# Patient Record
Sex: MALE | Race: WHITE | Employment: PART TIME | URBAN - METROPOLITAN AREA
[De-identification: names, ages, dates, MRNs, and addresses within clinical notes are randomized per-mention and may not be internally consistent; named-entity substitution may affect disease eponyms.]

---

## 2017-03-03 DIAGNOSIS — F41.9 ANXIETY: ICD-10-CM

## 2017-03-03 RX ORDER — ESCITALOPRAM OXALATE 20 MG/1
TABLET ORAL
Qty: 30 TABLET | Refills: 0 | Status: SHIPPED | OUTPATIENT
Start: 2017-03-03 | End: 2017-03-08 | Stop reason: SDUPTHER

## 2017-03-08 ENCOUNTER — OFFICE VISIT (OUTPATIENT)
Dept: FAMILY MEDICINE CLINIC | Age: 47
End: 2017-03-08

## 2017-03-08 VITALS
BODY MASS INDEX: 38.33 KG/M2 | WEIGHT: 283 LBS | HEART RATE: 62 BPM | SYSTOLIC BLOOD PRESSURE: 130 MMHG | DIASTOLIC BLOOD PRESSURE: 58 MMHG | HEIGHT: 72 IN | RESPIRATION RATE: 16 BRPM

## 2017-03-08 DIAGNOSIS — F41.9 ANXIETY: ICD-10-CM

## 2017-03-08 PROCEDURE — 99213 OFFICE O/P EST LOW 20 MIN: CPT | Performed by: FAMILY MEDICINE

## 2017-03-08 RX ORDER — ESCITALOPRAM OXALATE 20 MG/1
TABLET ORAL
Qty: 90 TABLET | Refills: 1 | Status: SHIPPED | OUTPATIENT
Start: 2017-03-08 | End: 2017-07-10 | Stop reason: SDUPTHER

## 2017-03-08 ASSESSMENT — PATIENT HEALTH QUESTIONNAIRE - PHQ9
1. LITTLE INTEREST OR PLEASURE IN DOING THINGS: 0
SUM OF ALL RESPONSES TO PHQ QUESTIONS 1-9: 0
2. FEELING DOWN, DEPRESSED OR HOPELESS: 0
SUM OF ALL RESPONSES TO PHQ9 QUESTIONS 1 & 2: 0

## 2017-07-10 DIAGNOSIS — F41.9 ANXIETY: ICD-10-CM

## 2017-07-10 RX ORDER — ESCITALOPRAM OXALATE 20 MG/1
TABLET ORAL
Qty: 90 TABLET | Refills: 0 | Status: SHIPPED | OUTPATIENT
Start: 2017-07-10 | End: 2018-01-08 | Stop reason: SDUPTHER

## 2017-08-10 DIAGNOSIS — F41.9 ANXIETY: ICD-10-CM

## 2017-11-03 ENCOUNTER — OFFICE VISIT (OUTPATIENT)
Dept: FAMILY MEDICINE CLINIC | Age: 47
End: 2017-11-03

## 2017-11-03 ENCOUNTER — HOSPITAL ENCOUNTER (OUTPATIENT)
Dept: INFUSION THERAPY | Age: 47
Discharge: HOME | End: 2017-11-30
Attending: FAMILY MEDICINE | Admitting: FAMILY MEDICINE

## 2017-11-03 ENCOUNTER — HOSPITAL ENCOUNTER (OUTPATIENT)
Dept: OTHER | Age: 47
Discharge: HOME | End: 2017-11-03
Attending: FAMILY MEDICINE | Admitting: FAMILY MEDICINE

## 2017-11-03 VITALS
HEIGHT: 72 IN | BODY MASS INDEX: 33.86 KG/M2 | HEART RATE: 86 BPM | WEIGHT: 250 LBS | DIASTOLIC BLOOD PRESSURE: 61 MMHG | SYSTOLIC BLOOD PRESSURE: 89 MMHG

## 2017-11-03 VITALS
RESPIRATION RATE: 17 BRPM | TEMPERATURE: 98.2 F | OXYGEN SATURATION: 97 % | SYSTOLIC BLOOD PRESSURE: 112 MMHG | DIASTOLIC BLOOD PRESSURE: 70 MMHG | HEART RATE: 68 BPM

## 2017-11-03 DIAGNOSIS — R63.4 UNINTENTIONAL WEIGHT LOSS: Primary | ICD-10-CM

## 2017-11-03 DIAGNOSIS — E86.0 DEHYDRATION: ICD-10-CM

## 2017-11-03 DIAGNOSIS — M54.50 ACUTE BILATERAL LOW BACK PAIN WITHOUT SCIATICA: ICD-10-CM

## 2017-11-03 LAB
ALBUMIN SERPL-MCNC: 4.1 G/DL (ref 3.4–5)
ALP BLD-CCNC: 94 U/L (ref 40–129)
ALT SERPL-CCNC: 22 U/L (ref 10–40)
ANION GAP SERPL CALCULATED.3IONS-SCNC: 16 MMOL/L (ref 3–16)
AST SERPL-CCNC: 18 U/L (ref 15–37)
BASOPHILS ABSOLUTE: 0.1 K/UL (ref 0–0.2)
BASOPHILS RELATIVE PERCENT: 0.5 %
BILIRUB SERPL-MCNC: 0.7 MG/DL (ref 0–1)
BILIRUBIN DIRECT: <0.2 MG/DL (ref 0–0.3)
BILIRUBIN, INDIRECT: NORMAL MG/DL (ref 0–1)
BUN BLDV-MCNC: 15 MG/DL (ref 7–20)
C-REACTIVE PROTEIN: 34.8 MG/L (ref 0–5.1)
CALCIUM SERPL-MCNC: 10.1 MG/DL (ref 8.3–10.6)
CHLORIDE BLD-SCNC: 98 MMOL/L (ref 99–110)
CO2: 26 MMOL/L (ref 21–32)
CREAT SERPL-MCNC: 0.9 MG/DL (ref 0.9–1.3)
CRYSTALS, UA: ABNORMAL /HPF
EOSINOPHILS ABSOLUTE: 0.3 K/UL (ref 0–0.6)
EOSINOPHILS RELATIVE PERCENT: 2.5 %
EPITHELIAL CELLS, UA: 0 /HPF (ref 0–5)
GFR AFRICAN AMERICAN: >60
GFR NON-AFRICAN AMERICAN: >60
GLUCOSE BLD-MCNC: 108 MG/DL (ref 70–99)
HCT VFR BLD CALC: 38.4 % (ref 40.5–52.5)
HEMOGLOBIN: 12.8 G/DL (ref 13.5–17.5)
HYALINE CASTS: 9 /LPF (ref 0–8)
LYMPHOCYTES ABSOLUTE: 1.8 K/UL (ref 1–5.1)
LYMPHOCYTES RELATIVE PERCENT: 18.1 %
MCH RBC QN AUTO: 28 PG (ref 26–34)
MCHC RBC AUTO-ENTMCNC: 33.4 G/DL (ref 31–36)
MCV RBC AUTO: 83.9 FL (ref 80–100)
MONOCYTES ABSOLUTE: 0.8 K/UL (ref 0–1.3)
MONOCYTES RELATIVE PERCENT: 7.8 %
NEUTROPHILS ABSOLUTE: 7.2 K/UL (ref 1.7–7.7)
NEUTROPHILS RELATIVE PERCENT: 71.1 %
PDW BLD-RTO: 13.6 % (ref 12.4–15.4)
PLATELET # BLD: 309 K/UL (ref 135–450)
PMV BLD AUTO: 10.6 FL (ref 5–10.5)
POTASSIUM SERPL-SCNC: 4.3 MMOL/L (ref 3.5–5.1)
RBC # BLD: 4.57 M/UL (ref 4.2–5.9)
RBC UA: 4 /HPF (ref 0–4)
SEDIMENTATION RATE, ERYTHROCYTE: 98 MM/HR (ref 0–15)
SODIUM BLD-SCNC: 140 MMOL/L (ref 136–145)
TOTAL PROTEIN: 8.2 G/DL (ref 6.4–8.2)
WBC # BLD: 10.2 K/UL (ref 4–11)
WBC UA: 35 /HPF (ref 0–5)

## 2017-11-03 PROCEDURE — 99214 OFFICE O/P EST MOD 30 MIN: CPT | Performed by: FAMILY MEDICINE

## 2017-11-03 PROCEDURE — 36415 COLL VENOUS BLD VENIPUNCTURE: CPT | Performed by: FAMILY MEDICINE

## 2017-11-03 RX ORDER — METHYLPREDNISOLONE 4 MG/1
TABLET ORAL
Qty: 1 KIT | Refills: 0 | Status: ON HOLD | OUTPATIENT
Start: 2017-11-03 | End: 2017-11-10 | Stop reason: HOSPADM

## 2017-11-03 RX ORDER — SODIUM CHLORIDE 9 MG/ML
INJECTION, SOLUTION INTRAVENOUS ONCE
Status: COMPLETED | OUTPATIENT
Start: 2017-11-03 | End: 2017-11-03

## 2017-11-03 RX ORDER — CYCLOBENZAPRINE HCL 5 MG
5 TABLET ORAL 3 TIMES DAILY PRN
Qty: 30 TABLET | Refills: 0 | Status: SHIPPED | OUTPATIENT
Start: 2017-11-03 | End: 2017-11-13

## 2017-11-03 RX ORDER — NAPROXEN 500 MG/1
500 TABLET ORAL 2 TIMES DAILY WITH MEALS
Qty: 60 TABLET | Refills: 0 | Status: SHIPPED | OUTPATIENT
Start: 2017-11-03 | End: 2018-05-01 | Stop reason: ALTCHOICE

## 2017-11-03 RX ADMIN — SODIUM CHLORIDE: 9 INJECTION, SOLUTION INTRAVENOUS at 14:23

## 2017-11-05 ENCOUNTER — TELEPHONE (OUTPATIENT)
Dept: FAMILY MEDICINE CLINIC | Age: 47
End: 2017-11-05

## 2017-11-05 DIAGNOSIS — R63.4 ABNORMAL WEIGHT LOSS: Primary | ICD-10-CM

## 2017-11-05 DIAGNOSIS — R70.0 ESR RAISED: ICD-10-CM

## 2017-11-05 DIAGNOSIS — M54.50 LOW BACK PAIN WITHOUT SCIATICA, UNSPECIFIED BACK PAIN LATERALITY, UNSPECIFIED CHRONICITY: ICD-10-CM

## 2017-11-05 DIAGNOSIS — R79.82 CRP ELEVATED: ICD-10-CM

## 2017-11-06 ENCOUNTER — HOSPITAL ENCOUNTER (OUTPATIENT)
Dept: CT IMAGING | Age: 47
Discharge: HOME | End: 2017-11-06
Attending: FAMILY MEDICINE | Admitting: FAMILY MEDICINE

## 2017-11-06 ENCOUNTER — HOSPITAL ENCOUNTER (OUTPATIENT)
Dept: OTHER | Age: 47
Discharge: HOME | End: 2017-11-06
Attending: FAMILY MEDICINE | Admitting: FAMILY MEDICINE

## 2017-11-06 DIAGNOSIS — R79.82 CRP ELEVATED: ICD-10-CM

## 2017-11-06 DIAGNOSIS — R63.4 ABNORMAL WEIGHT LOSS: ICD-10-CM

## 2017-11-06 DIAGNOSIS — R70.0 ESR RAISED: ICD-10-CM

## 2017-11-06 DIAGNOSIS — M54.50 LOW BACK PAIN WITHOUT SCIATICA, UNSPECIFIED BACK PAIN LATERALITY, UNSPECIFIED CHRONICITY: ICD-10-CM

## 2017-11-06 NOTE — TELEPHONE ENCOUNTER
Called central scheduling and they will call me back after they get the approval stat time. Left message for patient to call back. MRI is on pending. Appointment 11:30 and be there 10:50. LM for Herlinda Medrano, [facility], to call us back. Unable to LM for Ken Meyer, v-mailbox has not been set up.

## 2017-11-06 NOTE — TELEPHONE ENCOUNTER
Yaritza,    I ordered CT scan of chest/abd/pelvis as well. Please schedule. Also, call lab back and make sure they received the add on orders for SPEP and path review of blood smear.       Keep trying to reach patient about imaging

## 2017-11-06 NOTE — TELEPHONE ENCOUNTER
Yaritza,    Call radiology and find out if he showed up for MRI.     If not, let them know we are still going to try to get hold of him for the CT scan this afternoon

## 2017-11-07 PROBLEM — R63.4 WEIGHT LOSS, NON-INTENTIONAL: Status: ACTIVE | Noted: 2017-11-07

## 2017-11-07 PROBLEM — M46.26 OSTEOMYELITIS OF LUMBAR SPINE (HCC): Status: ACTIVE | Noted: 2017-11-07

## 2017-11-07 PROBLEM — M46.46 DISCITIS OF LUMBAR REGION: Status: ACTIVE | Noted: 2017-11-07

## 2017-11-08 LAB
ALBUMIN SERPL-MCNC: 3.5 G/DL (ref 3.1–4.9)
ALPHA-1-GLOBULIN: 0.4 G/DL (ref 0.2–0.4)
ALPHA-2-GLOBULIN: 0.9 G/DL (ref 0.4–1.1)
BETA GLOBULIN: 1.3 G/DL (ref 0.9–1.6)
GAMMA GLOBULIN: 2.1 G/DL (ref 0.6–1.8)
SPE/IFE INTERPRETATION: NORMAL

## 2017-11-10 ENCOUNTER — TELEPHONE (OUTPATIENT)
Dept: FAMILY MEDICINE CLINIC | Age: 47
End: 2017-11-10

## 2017-11-13 ENCOUNTER — OFFICE VISIT (OUTPATIENT)
Dept: FAMILY MEDICINE CLINIC | Age: 47
End: 2017-11-13

## 2017-11-13 VITALS
TEMPERATURE: 98.4 F | OXYGEN SATURATION: 97 % | RESPIRATION RATE: 16 BRPM | WEIGHT: 253 LBS | HEART RATE: 99 BPM | SYSTOLIC BLOOD PRESSURE: 108 MMHG | HEIGHT: 72 IN | DIASTOLIC BLOOD PRESSURE: 60 MMHG | BODY MASS INDEX: 34.27 KG/M2

## 2017-11-13 DIAGNOSIS — L02.91 PHLEGMON: ICD-10-CM

## 2017-11-13 DIAGNOSIS — M46.46 DISCITIS OF LUMBAR REGION: Primary | ICD-10-CM

## 2017-11-13 DIAGNOSIS — M46.26 OSTEOMYELITIS OF LUMBAR SPINE (HCC): ICD-10-CM

## 2017-11-13 LAB
BASOPHILS ABSOLUTE: 0.05 /ΜL
BASOPHILS RELATIVE PERCENT: 1 %
BUN BLDV-MCNC: 12 MG/DL
C-REACTIVE PROTEIN: 32.7
CALCIUM SERPL-MCNC: 9.2 MG/DL
CHLORIDE BLD-SCNC: 106 MMOL/L
CO2: 28 MMOL/L
CREAT SERPL-MCNC: 0.94 MG/DL
EOSINOPHILS ABSOLUTE: 0.29 /ΜL
EOSINOPHILS RELATIVE PERCENT: 4 %
GFR CALCULATED: >60
GLUCOSE BLD-MCNC: 93 MG/DL
HCT VFR BLD CALC: 36 % (ref 41–53)
HEMOGLOBIN: 11.8 G/DL (ref 13.5–17.5)
LYMPHOCYTES ABSOLUTE: 1.66 /ΜL
LYMPHOCYTES RELATIVE PERCENT: 21 %
MCH RBC QN AUTO: 27 PG
MCHC RBC AUTO-ENTMCNC: 33 G/DL
MCV RBC AUTO: 82 FL
MONOCYTES ABSOLUTE: 0.55 /ΜL
MONOCYTES RELATIVE PERCENT: 7 %
NEUTROPHILS ABSOLUTE: 5.43 /ΜL
NEUTROPHILS RELATIVE PERCENT: 67 %
PDW BLD-RTO: 13.8 %
PLATELET # BLD: 292 K/ΜL
PMV BLD AUTO: ABNORMAL FL
POTASSIUM SERPL-SCNC: 4.4 MMOL/L
RBC # BLD: 4.39 10^6/ΜL
SEDIMENTATION RATE, ERYTHROCYTE: 44
SODIUM BLD-SCNC: 141 MMOL/L
TOTAL CK: 35 U/L
WBC # BLD: 8 10^3/ML

## 2017-11-13 PROCEDURE — 99214 OFFICE O/P EST MOD 30 MIN: CPT | Performed by: FAMILY MEDICINE

## 2017-11-13 PROCEDURE — 86580 TB INTRADERMAL TEST: CPT | Performed by: FAMILY MEDICINE

## 2017-11-13 NOTE — PROGRESS NOTES
weeks. Patient states he is now eating and drinking normally. He still is in a lot of pain. He does feel that the pain has mildly improved, not by much. He has a home health nurse that checks on him once a week. His wife helps give him his IV treatments daily. Vitals:    11/13/17 1430   BP: 108/60   Pulse: 99   Resp: 16   Temp: 98.4 °F (36.9 °C)   TempSrc: Oral   SpO2: 97%   Weight: 253 lb (114.8 kg)   Height: 6' (1.829 m)       Outpatient Prescriptions Marked as Taking for the 11/13/17 encounter (Office Visit) with Vaishnavi Delaney MD   Medication Sig Dispense Refill    cyclobenzaprine (FLEXERIL) 5 MG tablet Take 1 tablet by mouth 3 times daily as needed for Muscle spasms 30 tablet 0    naproxen (NAPROSYN) 500 MG tablet Take 1 tablet by mouth 2 times daily (with meals) 60 tablet 0    escitalopram (LEXAPRO) 20 MG tablet TAKE 1 TABLET DAILY 90 tablet 0       Past Medical History:   Diagnosis Date    Anxiety     was on paxil in the past    History of tobacco abuse        History reviewed. No pertinent surgical history.     Social History   Substance Use Topics    Smoking status: Former Smoker     Packs/day: 2.00     Years: 10.00     Quit date: 5/13/2007    Smokeless tobacco: Former User    Alcohol use No       Family History   Problem Relation Age of Onset    Stroke Father     Asthma Sister     High Blood Pressure Brother            ROS:    Constitutional:  Negative for activity or appetite change, fever or fatigue  HENT:  Negative for congestion, sinus pressure, or rhinorrhea  Eyes:  Negative for eye pain or visual changes  Resp:  Negative for SOB, chest tightness, cough  Cardiovascular: Negative for CP, palpitations, SANTOS, orthopnea, PND, LE edema  Gastrointestinal: Negative for abd pain, melena, BRBPR, N/V/D  Endocrine:  Negative for polydipsia and polyuria  :  Negative for dysuria, flank pain or urinary frequency  Musculoskeletal:  Negative for myalgias, +moderate back pain  Neuro: Negative for dizziness or lightheadedness  Psych: negative for depression or anxiety        PHYSICAL EXAM:  Constitutional:   · Reviewed vitals above  · Well Nourished, well developed, no distress       Neck:  · Symmetric and without masses  · No thyromegaly  Resp:  · Normal effort  · Clear to auscultation bilaterally without rhonchi, wheezing or crackles  Cardiovascular:  · On auscultation, normal S1 and S2 without murmurs, rubs or gallops  · No bruits of bilateral carotids and no JVD  Gastrointestinal:  · Nontender, nondistended, and no masses  · No hepatosplenomegaly        Musculoskeletal:  - Tenderness to Palpation:  upper lumbar region  - Range of Motion:     flexion- patient can only flex forward a couple inches before severe pain starts  extension- diminished range with pain    rotation- diminished range with pain bilaterally    lateral bending-diminished range with pain bilaterally  - Scoliosis is absent. - Hyperkyphosis is absent. Neurological:   - Straight leg raise is negative bilaterally. - Gait is abnormal - antalgic:  But he has an easier time raising from seated position than last time I saw him  - Heel walk is normal.  - Toe walk is normal.  - Motor function is normal.  - Sensory function is normal.  - Reflexes are intact and symmetrical bilaterally. Skin:  · No rashes on inspection  Psych:  · Normal mood and affect  · Normal insight and judgement    ASSESSMENT/PLAN:    Osteomyelitis of lumbar spine (HCC)/ Discitis of lumbar region/Phlegmon  Patient clinically showing some mild improvement. He is able to stand up more easily from a seated position than last time. He still does have significant pain, especially with minimal flexion of the spine. Patient told to call Dr. Rikki Arreaga ASAP to schedule his follow-up visit, as he is supposed to be seen by him in 2 weeks    Patient to continue to receive IV daptomycin and IV ceftriaxone daily via his PICC line until 1/3/18.     I fill out short-term disability paperwork, and recommend that patient remain off work until 1/3/18.      - Mantoux testing to ensure this is negative. Amadou Mcdonald

## 2017-11-15 ENCOUNTER — NURSE ONLY (OUTPATIENT)
Dept: FAMILY MEDICINE CLINIC | Age: 47
End: 2017-11-15

## 2017-11-15 LAB
INDURATION: 0
TB SKIN TEST: NEGATIVE

## 2017-11-16 ENCOUNTER — TELEPHONE (OUTPATIENT)
Dept: INFECTIOUS DISEASES | Age: 47
End: 2017-11-16

## 2017-11-16 NOTE — TELEPHONE ENCOUNTER
Call to morning to schedule follow up with pt and pt is insisting that you told him to come in 2 weeks after d/c from hospital.   He would not let me hang up to ensured him that you know I was not scheduling him for 2 weeks  He's scheduled for 1/3/18 and on wait list  Thank you

## 2017-11-20 LAB
BASOPHILS ABSOLUTE: 0.3 /ΜL
BASOPHILS RELATIVE PERCENT: 0 %
BUN BLDV-MCNC: 14 MG/DL
C-REACTIVE PROTEIN: 27.7
CALCIUM SERPL-MCNC: 9.1 MG/DL
CHLORIDE BLD-SCNC: 102 MMOL/L
CO2: 27 MMOL/L
CREAT SERPL-MCNC: 0.88 MG/DL
EOSINOPHILS ABSOLUTE: 0.35 /ΜL
EOSINOPHILS RELATIVE PERCENT: 5 %
GFR CALCULATED: 60
GLUCOSE BLD-MCNC: 128 MG/DL
HCT VFR BLD CALC: 36 % (ref 41–53)
HEMOGLOBIN: 11.8 G/DL (ref 13.5–17.5)
LYMPHOCYTES ABSOLUTE: 1.51 /ΜL
LYMPHOCYTES RELATIVE PERCENT: 21 %
MCH RBC QN AUTO: 27 PG
MCHC RBC AUTO-ENTMCNC: 33 G/DL
MCV RBC AUTO: 83 FL
MONOCYTES ABSOLUTE: 0.25 /ΜL
MONOCYTES RELATIVE PERCENT: 4 %
NEUTROPHILS ABSOLUTE: 4.99 /ΜL
NEUTROPHILS RELATIVE PERCENT: ABNORMAL %
PDW BLD-RTO: 13.8 %
PLATELET # BLD: 283 K/ΜL
PMV BLD AUTO: ABNORMAL FL
POTASSIUM SERPL-SCNC: 4 MMOL/L
RBC # BLD: 4.32 10^6/ΜL
SEDIMENTATION RATE, ERYTHROCYTE: 25
SODIUM BLD-SCNC: 138 MMOL/L
TOTAL CK: 29 U/L
WBC # BLD: 7.1 10^3/ML

## 2017-11-27 LAB
BASOPHILS ABSOLUTE: 1 /ΜL
BASOPHILS RELATIVE PERCENT: 0.04 %
BUN BLDV-MCNC: 17 MG/DL
C-REACTIVE PROTEIN: 13.2
CALCIUM SERPL-MCNC: 9.4 MG/DL
CHLORIDE BLD-SCNC: 100 MMOL/L
CO2: 28 MMOL/L
CREAT SERPL-MCNC: 0.9 MG/DL
EOSINOPHILS ABSOLUTE: 9 /ΜL
EOSINOPHILS RELATIVE PERCENT: 0.66 %
GFR CALCULATED: 60
GLUCOSE BLD-MCNC: 83 MG/DL
HCT VFR BLD CALC: 36 % (ref 41–53)
HEMOGLOBIN: 11.9 G/DL (ref 13.5–17.5)
LYMPHOCYTES ABSOLUTE: 30 /ΜL
LYMPHOCYTES RELATIVE PERCENT: 2.26 %
MCH RBC QN AUTO: 27 PG
MCHC RBC AUTO-ENTMCNC: 33 G/DL
MCV RBC AUTO: 81 FL
MONOCYTES ABSOLUTE: 8 /ΜL
MONOCYTES RELATIVE PERCENT: 0.58 %
NEUTROPHILS ABSOLUTE: 52 /ΜL
NEUTROPHILS RELATIVE PERCENT: 3.99 %
PDW BLD-RTO: 14.4 %
PLATELET # BLD: 255 K/ΜL
PMV BLD AUTO: ABNORMAL FL
POTASSIUM SERPL-SCNC: 4.4 MMOL/L
RBC # BLD: 4.41 10^6/ΜL
SEDIMENTATION RATE, ERYTHROCYTE: 32
SODIUM BLD-SCNC: 137 MMOL/L
TOTAL CK: 70 U/L
WBC # BLD: 7.6 10^3/ML

## 2017-12-01 ENCOUNTER — TELEPHONE (OUTPATIENT)
Dept: FAMILY MEDICINE CLINIC | Age: 47
End: 2017-12-01

## 2017-12-01 ENCOUNTER — HOSPITAL ENCOUNTER (OUTPATIENT)
Dept: INFUSION THERAPY | Age: 47
Discharge: HOME | End: 2017-12-31
Attending: FAMILY MEDICINE | Admitting: FAMILY MEDICINE

## 2017-12-01 NOTE — TELEPHONE ENCOUNTER
Patient calling because he states he is feeling a little better and is wondering if he can return to work on light duty. Please call him back at [phone].

## 2017-12-01 NOTE — TELEPHONE ENCOUNTER
He will need to schedule appointment for evaluation to determine this    Please document call and then close encounter.   thanks

## 2017-12-04 ENCOUNTER — OFFICE VISIT (OUTPATIENT)
Dept: FAMILY MEDICINE CLINIC | Age: 47
End: 2017-12-04

## 2017-12-04 VITALS
DIASTOLIC BLOOD PRESSURE: 70 MMHG | BODY MASS INDEX: 35.49 KG/M2 | HEIGHT: 72 IN | OXYGEN SATURATION: 98 % | HEART RATE: 88 BPM | SYSTOLIC BLOOD PRESSURE: 134 MMHG | RESPIRATION RATE: 16 BRPM | WEIGHT: 262 LBS

## 2017-12-04 DIAGNOSIS — M46.46 DISCITIS OF LUMBAR REGION: ICD-10-CM

## 2017-12-04 DIAGNOSIS — M46.26 OSTEOMYELITIS OF LUMBAR SPINE (HCC): Primary | ICD-10-CM

## 2017-12-04 LAB
BASOPHILS ABSOLUTE: 0.11 /ΜL
BASOPHILS RELATIVE PERCENT: 1 %
BUN BLDV-MCNC: 13 MG/DL
C-REACTIVE PROTEIN: 7.7
CALCIUM SERPL-MCNC: 9 MG/DL
CHLORIDE BLD-SCNC: 104 MMOL/L
CO2: 26 MMOL/L
CREAT SERPL-MCNC: 0.84 MG/DL
EOSINOPHILS ABSOLUTE: 0.51 /ΜL
EOSINOPHILS RELATIVE PERCENT: 6 %
GFR CALCULATED: 60
GLUCOSE BLD-MCNC: 87 MG/DL
HCT VFR BLD CALC: 35 % (ref 41–53)
HEMOGLOBIN: 11.5 G/DL (ref 13.5–17.5)
LYMPHOCYTES ABSOLUTE: 2.08 /ΜL
LYMPHOCYTES RELATIVE PERCENT: 26 %
MCH RBC QN AUTO: 27 PG
MCHC RBC AUTO-ENTMCNC: 33 G/DL
MCV RBC AUTO: 82 FL
MONOCYTES ABSOLUTE: 0.52 /ΜL
MONOCYTES RELATIVE PERCENT: 7 %
NEUTROPHILS ABSOLUTE: 4.78 /ΜL
NEUTROPHILS RELATIVE PERCENT: 60 %
PDW BLD-RTO: 14 %
PLATELET # BLD: 263 K/ΜL
PMV BLD AUTO: ABNORMAL FL
POTASSIUM SERPL-SCNC: 4.4 MMOL/L
RBC # BLD: 4.29 10^6/ΜL
SEDIMENTATION RATE, ERYTHROCYTE: 25
SODIUM BLD-SCNC: 138 MMOL/L
TOTAL CK: 50 U/L
WBC # BLD: 8 10^3/ML

## 2017-12-04 PROCEDURE — 99214 OFFICE O/P EST MOD 30 MIN: CPT | Performed by: FAMILY MEDICINE

## 2017-12-04 NOTE — PROGRESS NOTES
Mario Self   YOB: 1970    Date of Visit:  12/4/2017     CC:  Osteomyelitis and discitis with phlegmon or lumbar spine      HPI:   Patient here for follow-up visit to see if he can return to work on light duty. After presenting with severe back pain, patient was diagnosed with osteomyelitis and discitis of L2/L3 lumbar spine in the beginning of November 2017. He was hospitalized from 11/6/17 to 11/10/17. He was discharged home with PICC line and receives IV daptomycin and ceftriaxone daily. He is to continue this treatment until 1/3/18. Patient states he still has some mild back pain, but does feel he can go back to work on light duty. He does not want to use up all of his FMLA for this one incident. He understands that he cannot do any activity that would harm his PICC line. 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 Vitals:    12/04/17 0926   BP: 134/70   Pulse: 88   Resp: 16   SpO2: 98%   Weight: 262 lb (118.8 kg)   Height: 6' (1.829 m)       Outpatient Prescriptions Marked as Taking for the 12/4/17 encounter (Office Visit) with Dara Trevino MD   Medication Sig Dispense Refill    naproxen (NAPROSYN) 500 MG tablet Take 1 tablet by mouth 2 times daily (with meals) 60 tablet 0    escitalopram (LEXAPRO) 20 MG tablet TAKE 1 TABLET DAILY 90 tablet 0       Past Medical History:   Diagnosis Date    Anxiety     was on paxil in the past    History of tobacco abuse        History reviewed. No pertinent surgical history.     Social History   Substance Use Topics    Smoking status: Former Smoker     Packs/day: 2.00     Years: 10.00     Quit date: 5/13/2007    Smokeless tobacco: Former User    Alcohol use No       Family History   Problem Relation Age of Onset    Stroke Father     Asthma Sister     High Blood Pressure Brother            ROS: (HCC)/ Discitis of lumbar region/Phlegmon  Patient clinically showing some good improvement. Ok to return to work with light duty restrictions:  No lifting greater than 10 lbs, and no activities that could pull out his PICC line. Patient to continue to receive IV daptomycin and IV ceftriaxone daily via his PICC line until 1/3/18. Pt [facility],  next month              .

## 2017-12-11 LAB
BASOPHILS ABSOLUTE: 0.02 /ΜL
BASOPHILS RELATIVE PERCENT: 0 %
BUN BLDV-MCNC: 12 MG/DL
C-REACTIVE PROTEIN: 130
CALCIUM SERPL-MCNC: 8.9 MG/DL
CHLORIDE BLD-SCNC: 102 MMOL/L
CO2: 25 MMOL/L
CREAT SERPL-MCNC: 0.81 MG/DL
EOSINOPHILS ABSOLUTE: 0.36 /ΜL
EOSINOPHILS RELATIVE PERCENT: 4 %
GFR CALCULATED: 60
GLUCOSE BLD-MCNC: 180 MG/DL
HCT VFR BLD CALC: 34 % (ref 41–53)
HEMOGLOBIN: 11.3 G/DL (ref 13.5–17.5)
LYMPHOCYTES ABSOLUTE: 1.33 /ΜL
LYMPHOCYTES RELATIVE PERCENT: 14 %
MCH RBC QN AUTO: 27 PG
MCHC RBC AUTO-ENTMCNC: 33 G/DL
MCV RBC AUTO: 82 FL
MONOCYTES ABSOLUTE: 0.36 /ΜL
MONOCYTES RELATIVE PERCENT: 4 %
NEUTROPHILS ABSOLUTE: 7.42 /ΜL
NEUTROPHILS RELATIVE PERCENT: 78 %
PDW BLD-RTO: 14.8 %
PLATELET # BLD: 285 K/ΜL
PMV BLD AUTO: ABNORMAL FL
POTASSIUM SERPL-SCNC: 4.2 MMOL/L
RBC # BLD: 4.2 10^6/ΜL
SEDIMENTATION RATE, ERYTHROCYTE: 27
SODIUM BLD-SCNC: 136 MMOL/L
WBC # BLD: 9.5 10^3/ML

## 2017-12-12 ENCOUNTER — TELEPHONE (OUTPATIENT)
Dept: INFECTIOUS DISEASES | Age: 47
End: 2017-12-12

## 2017-12-15 ENCOUNTER — TELEPHONE (OUTPATIENT)
Dept: INFECTIOUS DISEASES | Age: 47
End: 2017-12-15

## 2017-12-15 RX ORDER — CARISOPRODOL 350 MG/1
350 TABLET ORAL 3 TIMES DAILY PRN
Qty: 30 TABLET | Refills: 1 | Status: SHIPPED | OUTPATIENT
Start: 2017-12-15 | End: 2017-12-25

## 2017-12-15 NOTE — TELEPHONE ENCOUNTER
Hospt at [facility]  - 11/7 - 11/10 L2/3 vertebral OM with epidural thickening  Biopsy 11/9, cult neg. Discharged on daptomycin + ceftriaxone. Pt reports improved, had returned to work (light duty, ), taking 'alleve' PRN. Pt reports increase in pain over 3 days. I note that CRP went up.     Rec/  No work  Take NSAI and muscle relaxant - ordered carisoprodol / soma  Has appt Mon - if still with significant / worse pain, will order f/u MRI

## 2017-12-18 ENCOUNTER — TELEPHONE (OUTPATIENT)
Dept: INFECTIOUS DISEASES | Age: 47
End: 2017-12-18

## 2017-12-18 ENCOUNTER — OFFICE VISIT (OUTPATIENT)
Dept: INFECTIOUS DISEASES | Age: 47
End: 2017-12-18

## 2017-12-18 VITALS
BODY MASS INDEX: 33.11 KG/M2 | SYSTOLIC BLOOD PRESSURE: 116 MMHG | DIASTOLIC BLOOD PRESSURE: 68 MMHG | HEIGHT: 74 IN | TEMPERATURE: 98 F | WEIGHT: 258 LBS

## 2017-12-18 DIAGNOSIS — M46.26 OSTEOMYELITIS OF LUMBAR SPINE (HCC): Primary | ICD-10-CM

## 2017-12-18 DIAGNOSIS — M46.46 DISCITIS OF LUMBAR REGION: ICD-10-CM

## 2017-12-18 PROCEDURE — 99215 OFFICE O/P EST HI 40 MIN: CPT | Performed by: INTERNAL MEDICINE

## 2017-12-18 NOTE — PROGRESS NOTES
Infectious Diseases Oupatient Follow-up Note    Primary Care Physician:   Diana Walters MD  Last visit:    [facility] 11/10/17   History Obtained From:    Patient, EPIC    CHIEF COMPLAINT / ID problem:     Chief Complaint   Patient presents with    Follow-Up from [address] [place] [address] / Interval history:      Esperanza Reyes is a 40 y.o. male who was seen today for L2/3 discitis, hospital f/u.    50 yo man with hx chronic LBP with 1 mo worsening back pain. Hedii Harkins has been unable to go to work, has had loss of appetite with weight loss.  Denies fever / chills.  No wounds / skin infection / IVDU / Andrews Estuardo does have complaint of urinary frequency, diminished stream.  Saw chiropractor, had adjustment and f/u with no improvement.       Presented to PCP 11/3 , looked ill and volume depleted - referred for IVF and MRI.  MRI with L 2/3 vert osteo with mild epidural thickening (reviewed MRI with Dr Tia Garcia on 34/9).  [facility], transferred to [facility]. [facility] sent (11/6 - no growth to date).  ESR 98, CRP 34.8.  Started on vancomycin and cefepime. S/p L3 bone biopsy 11/9 (11G bone biopsy needle). GS with 4+WBC, no organism. Culture - no growth. Discharged on 11/10 on iv ceftriaxone and daptomycin. Pt went home with R PICC. Reports he is getting antibiotic daily, no side effects. He reports that his back is 'sore', pain varies. Returned to work 1 week ago and had had more firm. Spoke to pt on Fri 11/15 - since then he taking carisoprodol, no further NSAI (GI upset) with improvement. Today 12/18 - improved. Past Medical History:    Past Medical History:   Diagnosis Date    Anxiety     was on paxil in the past    History of tobacco abuse        Past Surgical History:    No past surgical history on file.     Current Medications:    Current Outpatient Prescriptions   Medication Sig Dispense Refill    carisoprodol (SOMA) 350 MG tablet Take 1 tablet by mouth 3 times daily as needed for Muscle spasms . 30 tablet 1    naproxen (NAPROSYN) 500 MG tablet Take 1 tablet by mouth 2 times daily (with meals) 60 tablet 0    escitalopram (LEXAPRO) 20 MG tablet TAKE 1 TABLET DAILY 90 tablet 0     No current facility-administered medications for this visit. Allergies:  Review of patient's allergies indicates no known allergies. Social History:    TOBACCO:                 Quit 2007  ETOH:                         None  DRUGS:                      None  MARITAL STATUS:       OCCUPATION:           Security     Family History:   No immunodeficiency    REVIEW OF SYSTEMS:    No fever / chills / sweats. No weight loss. No visual change, eye pain, eye discharge. No oral lesion, sore throat, dysphagia. Denies cough / sputum. Denies chest pain, palpitations. Denies n / v / abd pain. No diarrhea. Denies dysuria or change in urinary function. Denies joint swelling or pain. No myalgia, arthralgia. Denies skin change, rash, itching  Denies focal weakness, sensory change or other neurologic symptom  Denies new depression or other psychiatric problem. No symptoms endocrine disorder. No symptoms hematologic disorder. PHYSICAL EXAM:    Vitals:    /68   Temp 98 °F (36.7 °C) (Oral)   Ht 6' 2\" (1.88 m)   Wt 258 lb (117 kg)   BMI 33.13 kg/m²     GENERAL: No apparent distress.     HEENT: Membranes moist, no oral lesion  NECK:  Supple  LYMPH: No adenopathy   LUNGS: Clear b/l, no rales, no dullness  CARDIAC: RRR, no murmur appreciated  ABD:  + BS, soft / NT  EXT:  No rash, no edema, no lesions  BACK:  No reproducible back pain with palpation  NEURO: No focal neurologic findings  PSYCH: Orientation, sensorium, mood normal  LINE:   R PICC in place      DATA:    See [facility]    11/6 MRI thoracic / lumbar spine W/WO contrast [reviewed today 12/18/17 and showed pt findings]  - Findings of acute L2-3 discitis/osteomyelitis with mild paraspinal and ventral epidural phlegmon, the

## 2017-12-19 LAB
ALBUMIN SERPL-MCNC: 3.1 G/DL
ALP BLD-CCNC: 82 U/L
ALT SERPL-CCNC: 21 U/L
ANION GAP SERPL CALCULATED.3IONS-SCNC: NORMAL MMOL/L
AST SERPL-CCNC: 17 U/L
BASOPHILS ABSOLUTE: 0.05 /ΜL
BASOPHILS RELATIVE PERCENT: 1 %
BILIRUB SERPL-MCNC: 0.4 MG/DL (ref 0.1–1.4)
BUN BLDV-MCNC: 10 MG/DL
C-REACTIVE PROTEIN: 112
CALCIUM SERPL-MCNC: 8.7 MG/DL
CHLORIDE BLD-SCNC: 98 MMOL/L
CO2: 26 MMOL/L
CREAT SERPL-MCNC: 0.85 MG/DL
EOSINOPHILS ABSOLUTE: 0.45 /ΜL
EOSINOPHILS RELATIVE PERCENT: 5 %
GFR CALCULATED: 60
GLUCOSE BLD-MCNC: 398 MG/DL
HCT VFR BLD CALC: 31 % (ref 41–53)
HEMOGLOBIN: 10.5 G/DL (ref 13.5–17.5)
LYMPHOCYTES ABSOLUTE: 1.48 /ΜL
LYMPHOCYTES RELATIVE PERCENT: 15 %
MCH RBC QN AUTO: 27 PG
MCHC RBC AUTO-ENTMCNC: 34 G/DL
MCV RBC AUTO: 79 FL
MONOCYTES ABSOLUTE: 0.58 /ΜL
MONOCYTES RELATIVE PERCENT: 6 %
NEUTROPHILS ABSOLUTE: 7.49 /ΜL
NEUTROPHILS RELATIVE PERCENT: 73 %
PDW BLD-RTO: 13.4 %
PLATELET # BLD: 335 K/ΜL
PMV BLD AUTO: ABNORMAL FL
POTASSIUM SERPL-SCNC: 4.2 MMOL/L
RBC # BLD: 3.96 10^6/ΜL
SEDIMENTATION RATE, ERYTHROCYTE: 69
SODIUM BLD-SCNC: 132 MMOL/L
TOTAL CK: 30 U/L
TOTAL PROTEIN: 6.8
WBC # BLD: 10.1 10^3/ML

## 2017-12-20 ENCOUNTER — TELEPHONE (OUTPATIENT)
Dept: FAMILY MEDICINE CLINIC | Age: 47
End: 2017-12-20

## 2017-12-20 ENCOUNTER — TELEPHONE (OUTPATIENT)
Dept: INFECTIOUS DISEASES | Age: 47
End: 2017-12-20

## 2017-12-20 DIAGNOSIS — M54.50 ACUTE BILATERAL LOW BACK PAIN WITHOUT SCIATICA: ICD-10-CM

## 2017-12-20 RX ORDER — CARISOPRODOL 350 MG/1
350 TABLET ORAL 3 TIMES DAILY PRN
Qty: 30 TABLET | Refills: 1 | Status: CANCELLED | OUTPATIENT
Start: 2017-12-20 | End: 2017-12-30

## 2017-12-20 RX ORDER — NAPROXEN 500 MG/1
500 TABLET ORAL 2 TIMES DAILY WITH MEALS
Qty: 60 TABLET | Refills: 0 | Status: CANCELLED | OUTPATIENT
Start: 2017-12-20

## 2017-12-26 ENCOUNTER — TELEPHONE (OUTPATIENT)
Dept: INFECTIOUS DISEASES | Age: 47
End: 2017-12-26

## 2017-12-26 NOTE — TELEPHONE ENCOUNTER
Pt has critical Glucose of 408  Dr. Paulette Arora notified, Pt on Daptomycin  675mg IV QD and Ceftriaxone 2gm Iv Qd. CK 26   Creatinine 0.99   CRP  41.1  ESR  64.

## 2018-01-01 LAB
BASOPHILS ABSOLUTE: 0.1 THOU/MCL (ref 0–0.2)
BASOPHILS ABSOLUTE: 1 %
BUN BLDV-MCNC: 14 MG/DL (ref 8–26)
C-REACTIVE PROTEIN WIDE RANGE: 11.5 MG/L
CALCIUM SERPL-MCNC: 9.1 MG/DL (ref 8.5–10.5)
CHLORIDE BLD-SCNC: 98 MEQ/L (ref 101–111)
CO2: 27 MEQ/L (ref 24–36)
CREAT SERPL-MCNC: 0.8 MG/DL (ref 0.64–1.27)
CREATINE KINASE ISOENZYMES, SER/PLAS: 40 IU/L (ref 0–200)
EOSINOPHILS ABSOLUTE: 0.3 THOU/MCL (ref 0.03–0.45)
EOSINOPHILS RELATIVE PERCENT: 3 %
GFR AFRICAN AMERICAN: >60 ML/MIN/1.73 SQ METER
GFR NON-AFRICAN AMERICAN: >60 ML/MIN/1.73 SQ METER
GLUCOSE BLD-MCNC: 238 MG/DL (ref 70–99)
HCT VFR BLD CALC: 33 % (ref 40–50)
HEMOGLOBIN: 10.9 G/DL (ref 13.5–16.5)
LYMPHOCYTES ABSOLUTE: 1.9 THOU/MCL (ref 1–4)
LYMPHOCYTES RELATIVE PERCENT: 21 %
MCH RBC QN AUTO: 27 PG (ref 27–33)
MCHC RBC AUTO-ENTMCNC: 33 G/DL (ref 32–36)
MCV RBC AUTO: 81 FL (ref 82–97)
MONOCYTES # BLD: 7 %
MONOCYTES ABSOLUTE: 0.6 THOU/MCL (ref 0.2–0.9)
NEUTROPHILS ABSOLUTE: 6.2 THOU/MCL (ref 1.8–7.7)
OSMOLALITY CALCULATION: 276 MOSM/KG (ref 280–300)
PDW BLD-RTO: 12.8 %
PLATELET # BLD: 250 THOU/MCL (ref 140–375)
POTASSIUM SERPL-SCNC: 4.4 MEQ/L (ref 3.6–5.1)
RBC # BLD: 4.09 MIL/MCL (ref 4.4–5.8)
SEDIMENTATION RATE, ERYTHROCYTE: 71 MM/HR (ref 0–15)
SEG NEUTROPHILS: 68 %
SODIUM BLD-SCNC: 134 MEQ/L (ref 135–145)
WBC # BLD: 9.1 THOU/MCL (ref 3.6–10.5)

## 2018-01-03 ENCOUNTER — TELEPHONE (OUTPATIENT)
Dept: INFECTIOUS DISEASES | Age: 48
End: 2018-01-03

## 2018-01-03 NOTE — TELEPHONE ENCOUNTER
Order given to Mariaojse Huerta with [employer] to continue this patient IV Daptomycin and Ceftriaxone as ordered until 1/15/18. Lab orders continue as ordered per Dr. Malaika Bryant.

## 2018-01-08 DIAGNOSIS — M54.50 ACUTE BILATERAL LOW BACK PAIN WITHOUT SCIATICA: ICD-10-CM

## 2018-01-08 DIAGNOSIS — F41.9 ANXIETY: ICD-10-CM

## 2018-01-08 LAB
BASOPHILS ABSOLUTE: 0 /ΜL
BASOPHILS ABSOLUTE: 0 /ΜL
BASOPHILS ABSOLUTE: 0 THOU/MCL (ref 0–0.2)
BASOPHILS ABSOLUTE: 1 %
BASOPHILS RELATIVE PERCENT: 1 %
BASOPHILS RELATIVE PERCENT: 1 %
BUN BLDV-MCNC: 13 MG/DL
BUN BLDV-MCNC: 13 MG/DL
BUN BLDV-MCNC: 13 MG/DL (ref 8–26)
C-REACTIVE PROTEIN WIDE RANGE: 5.1 MG/L
C-REACTIVE PROTEIN: 5.1
C-REACTIVE PROTEIN: 5.1
CALCIUM SERPL-MCNC: 8.9 MG/DL
CALCIUM SERPL-MCNC: 8.9 MG/DL
CALCIUM SERPL-MCNC: 8.9 MG/DL (ref 8.5–10.5)
CHLORIDE BLD-SCNC: 100 MEQ/L (ref 101–111)
CHLORIDE BLD-SCNC: 100 MMOL/L
CHLORIDE BLD-SCNC: 100 MMOL/L
CO2: 30 MEQ/L (ref 24–36)
CO2: 30 MMOL/L
CO2: 30 MMOL/L
CREAT SERPL-MCNC: 1.13 MG/DL
CREAT SERPL-MCNC: 1.13 MG/DL
CREAT SERPL-MCNC: 1.13 MG/DL (ref 0.64–1.27)
CREATINE KINASE ISOENZYMES, SER/PLAS: 42 IU/L (ref 0–200)
EOSINOPHILS ABSOLUTE: 0.3 /ΜL
EOSINOPHILS ABSOLUTE: 0.3 /ΜL
EOSINOPHILS ABSOLUTE: 0.3 THOU/MCL (ref 0.03–0.45)
EOSINOPHILS RELATIVE PERCENT: 4 %
GFR AFRICAN AMERICAN: >60 ML/MIN/1.73 SQ METER
GFR CALCULATED: >60
GFR CALCULATED: NORMAL
GFR NON-AFRICAN AMERICAN: >60 ML/MIN/1.73 SQ METER
GLUCOSE BLD-MCNC: 420 MG/DL
GLUCOSE BLD-MCNC: 420 MG/DL
GLUCOSE BLD-MCNC: 420 MG/DL (ref 70–99)
HCT VFR BLD CALC: 32 % (ref 40–50)
HCT VFR BLD CALC: 32 % (ref 41–53)
HCT VFR BLD CALC: 32 % (ref 41–53)
HEMOGLOBIN: 10.6 G/DL (ref 13.5–16.5)
HEMOGLOBIN: 10.6 G/DL (ref 13.5–17.5)
HEMOGLOBIN: 10.6 G/DL (ref 13.5–17.5)
LYMPHOCYTES ABSOLUTE: 1.4 /ΜL
LYMPHOCYTES ABSOLUTE: 1.4 /ΜL
LYMPHOCYTES ABSOLUTE: 1.4 THOU/MCL (ref 1–4)
LYMPHOCYTES RELATIVE PERCENT: 20 %
MCH RBC QN AUTO: 27 PG
MCH RBC QN AUTO: 27 PG
MCH RBC QN AUTO: 27 PG (ref 27–33)
MCHC RBC AUTO-ENTMCNC: 34 G/DL
MCHC RBC AUTO-ENTMCNC: 34 G/DL
MCHC RBC AUTO-ENTMCNC: 34 G/DL (ref 32–36)
MCV RBC AUTO: 81 FL
MCV RBC AUTO: 81 FL
MCV RBC AUTO: 81 FL (ref 82–97)
MONOCYTES # BLD: 5 %
MONOCYTES ABSOLUTE: 0.3 /ΜL
MONOCYTES ABSOLUTE: 0.3 /ΜL
MONOCYTES ABSOLUTE: 0.3 THOU/MCL (ref 0.2–0.9)
MONOCYTES RELATIVE PERCENT: 5 %
MONOCYTES RELATIVE PERCENT: 5 %
NEUTROPHILS ABSOLUTE: 5 /ΜL
NEUTROPHILS ABSOLUTE: 5 /ΜL
NEUTROPHILS ABSOLUTE: 5 THOU/MCL (ref 1.8–7.7)
NEUTROPHILS RELATIVE PERCENT: 70 %
NEUTROPHILS RELATIVE PERCENT: 70 %
OSMOLALITY CALCULATION: 286 MOSM/KG (ref 280–300)
PDW BLD-RTO: 13.6 %
PLATELET # BLD: 242 K/ΜL
PLATELET # BLD: 242 K/ΜL
PLATELET # BLD: 242 THOU/MCL (ref 140–375)
PMV BLD AUTO: ABNORMAL FL
PMV BLD AUTO: ABNORMAL FL
POTASSIUM SERPL-SCNC: 4.2 MEQ/L (ref 3.6–5.1)
POTASSIUM SERPL-SCNC: 4.2 MMOL/L
POTASSIUM SERPL-SCNC: 4.2 MMOL/L
RBC # BLD: 3.91 10^6/ΜL
RBC # BLD: 3.91 10^6/ΜL
RBC # BLD: 3.91 MIL/MCL (ref 4.4–5.8)
SEDIMENTATION RATE, ERYTHROCYTE: 17
SEDIMENTATION RATE, ERYTHROCYTE: 17
SEDIMENTATION RATE, ERYTHROCYTE: 17 MM/HR (ref 0–15)
SEG NEUTROPHILS: 70 %
SODIUM BLD-SCNC: 134 MEQ/L (ref 135–145)
SODIUM BLD-SCNC: 134 MMOL/L
SODIUM BLD-SCNC: 134 MMOL/L
TOTAL CK: 42 U/L
TOTAL CK: 42 U/L
WBC # BLD: 7 10^3/ML
WBC # BLD: 7 10^3/ML
WBC # BLD: 7 THOU/MCL (ref 3.6–10.5)

## 2018-01-08 RX ORDER — ESCITALOPRAM OXALATE 20 MG/1
TABLET ORAL
Qty: 90 TABLET | Refills: 0 | Status: SHIPPED | OUTPATIENT
Start: 2018-01-08 | End: 2018-05-12 | Stop reason: SDUPTHER

## 2018-01-11 LAB
ALBUMIN SERPL-MCNC: 3.9 G/DL (ref 3.5–5)
ALP BLD-CCNC: 93 IU/L (ref 35–135)
ALT SERPL-CCNC: 19 IU/L (ref 10–60)
AST SERPL-CCNC: 16 IU/L (ref 10–40)
BILIRUB SERPL-MCNC: 0.5 MG/DL (ref 0–1.2)
BILIRUBIN DIRECT: 0.1 MG/DL (ref 0–0.2)
TOTAL PROTEIN: 7.4 G/DL (ref 6–8)

## 2018-01-15 ENCOUNTER — OFFICE VISIT (OUTPATIENT)
Dept: INFECTIOUS DISEASES | Age: 48
End: 2018-01-15

## 2018-01-15 ENCOUNTER — TELEPHONE (OUTPATIENT)
Dept: INFECTIOUS DISEASES | Age: 48
End: 2018-01-15

## 2018-01-15 VITALS
BODY MASS INDEX: 32.35 KG/M2 | SYSTOLIC BLOOD PRESSURE: 119 MMHG | WEIGHT: 252 LBS | DIASTOLIC BLOOD PRESSURE: 80 MMHG | TEMPERATURE: 99.1 F

## 2018-01-15 DIAGNOSIS — M46.46 DISCITIS OF LUMBAR REGION: ICD-10-CM

## 2018-01-15 DIAGNOSIS — M46.26 OSTEOMYELITIS OF LUMBAR SPINE (HCC): Primary | ICD-10-CM

## 2018-01-15 PROCEDURE — 99214 OFFICE O/P EST MOD 30 MIN: CPT | Performed by: INTERNAL MEDICINE

## 2018-01-15 NOTE — PROGRESS NOTES
Infectious Diseases Oupatient Follow-up Note    Primary Care Physician:   Kala Shipman MD  Last visit:    [facility] 11/10/17   History Obtained From:    Patient, EPIC    CHIEF COMPLAINT / ID problem:     Chief Complaint   Patient presents with    Follow-up     Osteomyelitis       HISTORY OF PRESENT ILLNESS / Interval history:      Antoni Adame is a 56 y.o. male who was seen today for L2/3 discitis, hospital f/u.    50 yo man with hx chronic LBP with 1 mo worsening back pain. Dane Alvarenga has been unable to go to work, has had loss of appetite with weight loss.  Denies fever / chills.  No wounds / skin infection / IVDU / Bonnetta Andrew does have complaint of urinary frequency, diminished stream.  Saw chiropractor, had adjustment and f/u with no improvement.       Presented to PCP 11/3 , looked ill and volume depleted - referred for IVF and MRI.  MRI with L 2/3 vert osteo with mild epidural thickening (reviewed MRI with Dr Prabha Bueno on 50/9).  Referred to [facility] ED 11/6, transferred to [facility]. [facility] sent (11/6 - no growth to date).  ESR 98, CRP 34.8.  Started on vancomycin and cefepime. S/p L3 bone biopsy 11/9 (11G bone biopsy needle). GS with 4+WBC, no organism. Culture - no growth. Discharged on 11/10 on iv ceftriaxone and daptomycin. Pt went home with R PICC. Reports he is getting antibiotic daily, no side effects. He reports that his back is 'sore', pain varies. Returned to work 1 week ago and had had more firm. Spoke to pt on Fri 11/15 - since then he taking carisoprodol, no further NSAI (GI upset) with improvement. Seen 12/18 - improved. Today 1/15/18 -   Pt reports taking antibiotics, dapto  /ceftriaxone daily. Less back pain. 'feels knot'. Working, not doing full duty. Past Medical History:    Past Medical History:   Diagnosis Date    Anxiety     was on paxil in the past    History of tobacco abuse        Past Surgical History:    No past surgical history on file.     Current

## 2018-01-17 ENCOUNTER — TELEPHONE (OUTPATIENT)
Dept: FAMILY MEDICINE CLINIC | Age: 48
End: 2018-01-17

## 2018-01-17 NOTE — LETTER
[address], [place] [address] [address]  Phone: [phone]  Fax: [phone]    Giovanny Villeda MD        January 17, 2018     Patient: Karthik Meyers   YOB: 1970   Date of Visit: 1/17/2018       To Whom It May Concern: It is my medical opinion that Karthik Meyers may return to full duty immediately with no restrictions. If you have any questions or concerns, please don't hesitate to call.     Sincerely,        Giovanny Villeda MD

## 2018-01-17 NOTE — TELEPHONE ENCOUNTER
Pt calling in to inform the doctor that he had his picc line removed yesterday and that he needs a note to return to full duty at. Pt states that he would like a return call when this has been done.  It is ok to leave a message      Fax number for his employer  [phone] luis [email or web address]

## 2018-05-01 ENCOUNTER — OFFICE VISIT (OUTPATIENT)
Dept: FAMILY MEDICINE CLINIC | Age: 48
End: 2018-05-01

## 2018-05-01 VITALS
RESPIRATION RATE: 16 BRPM | SYSTOLIC BLOOD PRESSURE: 138 MMHG | HEART RATE: 64 BPM | DIASTOLIC BLOOD PRESSURE: 86 MMHG | BODY MASS INDEX: 33.62 KG/M2 | WEIGHT: 262 LBS | HEIGHT: 74 IN

## 2018-05-01 DIAGNOSIS — K52.9 CHRONIC DIARRHEA: Primary | ICD-10-CM

## 2018-05-01 DIAGNOSIS — R73.03 PREDIABETES: ICD-10-CM

## 2018-05-01 DIAGNOSIS — Z13.220 SCREENING FOR HYPERLIPIDEMIA: ICD-10-CM

## 2018-05-01 LAB
A/G RATIO: 1.5 (ref 1.1–2.2)
ALBUMIN SERPL-MCNC: 4.5 G/DL (ref 3.4–5)
ALP BLD-CCNC: 107 U/L (ref 40–129)
ALT SERPL-CCNC: 69 U/L (ref 10–40)
ANION GAP SERPL CALCULATED.3IONS-SCNC: 18 MMOL/L (ref 3–16)
AST SERPL-CCNC: 48 U/L (ref 15–37)
BASOPHILS ABSOLUTE: 0 K/UL (ref 0–0.2)
BASOPHILS RELATIVE PERCENT: 0.6 %
BILIRUB SERPL-MCNC: 0.5 MG/DL (ref 0–1)
BUN BLDV-MCNC: 11 MG/DL (ref 7–20)
CALCIUM SERPL-MCNC: 9.1 MG/DL (ref 8.3–10.6)
CHLORIDE BLD-SCNC: 102 MMOL/L (ref 99–110)
CO2: 23 MMOL/L (ref 21–32)
CREAT SERPL-MCNC: 0.6 MG/DL (ref 0.9–1.3)
EOSINOPHILS ABSOLUTE: 0.3 K/UL (ref 0–0.6)
EOSINOPHILS RELATIVE PERCENT: 3.3 %
GFR AFRICAN AMERICAN: >60
GFR NON-AFRICAN AMERICAN: >60
GLOBULIN: 3.1 G/DL
GLUCOSE BLD-MCNC: 86 MG/DL (ref 70–99)
HBA1C MFR BLD: 6.1 %
HCT VFR BLD CALC: 39.7 % (ref 40.5–52.5)
HEMOGLOBIN: 13.4 G/DL (ref 13.5–17.5)
LYMPHOCYTES ABSOLUTE: 2.3 K/UL (ref 1–5.1)
LYMPHOCYTES RELATIVE PERCENT: 26.2 %
MCH RBC QN AUTO: 27.4 PG (ref 26–34)
MCHC RBC AUTO-ENTMCNC: 33.7 G/DL (ref 31–36)
MCV RBC AUTO: 81.2 FL (ref 80–100)
MONOCYTES ABSOLUTE: 0.5 K/UL (ref 0–1.3)
MONOCYTES RELATIVE PERCENT: 6.2 %
NEUTROPHILS ABSOLUTE: 5.6 K/UL (ref 1.7–7.7)
NEUTROPHILS RELATIVE PERCENT: 63.7 %
PDW BLD-RTO: 13.8 % (ref 12.4–15.4)
PLATELET # BLD: 240 K/UL (ref 135–450)
PMV BLD AUTO: 10 FL (ref 5–10.5)
POTASSIUM SERPL-SCNC: 4 MMOL/L (ref 3.5–5.1)
RBC # BLD: 4.89 M/UL (ref 4.2–5.9)
SODIUM BLD-SCNC: 143 MMOL/L (ref 136–145)
TOTAL PROTEIN: 7.6 G/DL (ref 6.4–8.2)
WBC # BLD: 8.7 K/UL (ref 4–11)

## 2018-05-01 PROCEDURE — 83036 HEMOGLOBIN GLYCOSYLATED A1C: CPT | Performed by: FAMILY MEDICINE

## 2018-05-01 PROCEDURE — 99214 OFFICE O/P EST MOD 30 MIN: CPT | Performed by: FAMILY MEDICINE

## 2018-05-01 ASSESSMENT — PATIENT HEALTH QUESTIONNAIRE - PHQ9
2. FEELING DOWN, DEPRESSED OR HOPELESS: 0
1. LITTLE INTEREST OR PLEASURE IN DOING THINGS: 0
SUM OF ALL RESPONSES TO PHQ9 QUESTIONS 1 & 2: 0
SUM OF ALL RESPONSES TO PHQ QUESTIONS 1-9: 0

## 2018-05-02 ENCOUNTER — TELEPHONE (OUTPATIENT)
Dept: FAMILY MEDICINE CLINIC | Age: 48
End: 2018-05-02

## 2018-05-07 LAB
REASON FOR REJECTION: NORMAL
REJECTED TEST: NORMAL

## 2018-05-08 ENCOUNTER — TELEPHONE (OUTPATIENT)
Dept: FAMILY MEDICINE CLINIC | Age: 48
End: 2018-05-08

## 2018-05-08 DIAGNOSIS — K52.9 CHRONIC DIARRHEA: Primary | ICD-10-CM

## 2018-05-08 LAB — GI BACTERIAL PATHOGENS BY PCR: NORMAL

## 2018-05-10 ENCOUNTER — TELEPHONE (OUTPATIENT)
Dept: FAMILY MEDICINE CLINIC | Age: 48
End: 2018-05-10

## 2018-05-12 DIAGNOSIS — F41.9 ANXIETY: ICD-10-CM

## 2018-05-14 RX ORDER — ESCITALOPRAM OXALATE 20 MG/1
TABLET ORAL
Qty: 90 TABLET | Refills: 0 | Status: SHIPPED | OUTPATIENT
Start: 2018-05-14 | End: 2018-08-18 | Stop reason: SDUPTHER

## 2018-05-15 ENCOUNTER — NURSE ONLY (OUTPATIENT)
Dept: FAMILY MEDICINE CLINIC | Age: 48
End: 2018-05-15

## 2018-05-15 DIAGNOSIS — Z13.220 SCREENING FOR HYPERLIPIDEMIA: ICD-10-CM

## 2018-05-15 DIAGNOSIS — R63.4 ABNORMAL WEIGHT LOSS: ICD-10-CM

## 2018-05-15 LAB
CHOLESTEROL, TOTAL: 118 MG/DL (ref 0–199)
HDLC SERPL-MCNC: 40 MG/DL (ref 40–60)
LDL CHOLESTEROL CALCULATED: 66 MG/DL
TRIGL SERPL-MCNC: 62 MG/DL (ref 0–150)
VLDLC SERPL CALC-MCNC: 12 MG/DL

## 2018-05-15 PROCEDURE — 36415 COLL VENOUS BLD VENIPUNCTURE: CPT | Performed by: FAMILY MEDICINE

## 2018-05-17 ENCOUNTER — TELEPHONE (OUTPATIENT)
Dept: FAMILY MEDICINE CLINIC | Age: 48
End: 2018-05-17

## 2018-08-18 DIAGNOSIS — F41.9 ANXIETY: ICD-10-CM

## 2018-08-20 RX ORDER — ESCITALOPRAM OXALATE 20 MG/1
TABLET ORAL
Qty: 30 TABLET | Refills: 2 | Status: SHIPPED | OUTPATIENT
Start: 2018-08-20 | End: 2018-11-27 | Stop reason: SDUPTHER

## 2018-11-06 ENCOUNTER — NURSE ONLY (OUTPATIENT)
Dept: FAMILY MEDICINE CLINIC | Age: 48
End: 2018-11-06
Payer: COMMERCIAL

## 2018-11-06 DIAGNOSIS — Z23 NEEDS FLU SHOT: Primary | ICD-10-CM

## 2018-11-06 PROCEDURE — 90471 IMMUNIZATION ADMIN: CPT | Performed by: FAMILY MEDICINE

## 2018-11-06 PROCEDURE — 90686 IIV4 VACC NO PRSV 0.5 ML IM: CPT | Performed by: FAMILY MEDICINE

## 2018-11-27 ENCOUNTER — TELEPHONE (OUTPATIENT)
Dept: FAMILY MEDICINE CLINIC | Age: 48
End: 2018-11-27

## 2018-11-27 DIAGNOSIS — F41.9 ANXIETY: ICD-10-CM

## 2018-11-27 RX ORDER — ESCITALOPRAM OXALATE 20 MG/1
TABLET ORAL
Qty: 30 TABLET | Refills: 0 | Status: SHIPPED | OUTPATIENT
Start: 2018-11-27 | End: 2018-12-11 | Stop reason: SDUPTHER

## 2018-12-11 ENCOUNTER — OFFICE VISIT (OUTPATIENT)
Dept: FAMILY MEDICINE CLINIC | Age: 48
End: 2018-12-11
Payer: COMMERCIAL

## 2018-12-11 VITALS
WEIGHT: 274 LBS | SYSTOLIC BLOOD PRESSURE: 133 MMHG | HEIGHT: 74 IN | HEART RATE: 77 BPM | BODY MASS INDEX: 35.16 KG/M2 | DIASTOLIC BLOOD PRESSURE: 83 MMHG

## 2018-12-11 DIAGNOSIS — E66.01 CLASS 2 SEVERE OBESITY DUE TO EXCESS CALORIES WITH SERIOUS COMORBIDITY AND BODY MASS INDEX (BMI) OF 35.0 TO 35.9 IN ADULT (HCC): ICD-10-CM

## 2018-12-11 DIAGNOSIS — F41.9 ANXIETY: ICD-10-CM

## 2018-12-11 DIAGNOSIS — R79.89 ELEVATED LFTS: ICD-10-CM

## 2018-12-11 DIAGNOSIS — I10 ESSENTIAL HYPERTENSION: ICD-10-CM

## 2018-12-11 DIAGNOSIS — R73.03 PREDIABETES: Primary | ICD-10-CM

## 2018-12-11 LAB
ALBUMIN SERPL-MCNC: 4.6 G/DL (ref 3.4–5)
ALP BLD-CCNC: 94 U/L (ref 40–129)
ALT SERPL-CCNC: 26 U/L (ref 10–40)
AST SERPL-CCNC: 19 U/L (ref 15–37)
BILIRUB SERPL-MCNC: 0.5 MG/DL (ref 0–1)
BILIRUBIN DIRECT: <0.2 MG/DL (ref 0–0.3)
BILIRUBIN, INDIRECT: NORMAL MG/DL (ref 0–1)
HBA1C MFR BLD: 6 %
TOTAL PROTEIN: 7.6 G/DL (ref 6.4–8.2)

## 2018-12-11 PROCEDURE — G8427 DOCREV CUR MEDS BY ELIG CLIN: HCPCS | Performed by: FAMILY MEDICINE

## 2018-12-11 PROCEDURE — 1036F TOBACCO NON-USER: CPT | Performed by: FAMILY MEDICINE

## 2018-12-11 PROCEDURE — 99214 OFFICE O/P EST MOD 30 MIN: CPT | Performed by: FAMILY MEDICINE

## 2018-12-11 PROCEDURE — 83036 HEMOGLOBIN GLYCOSYLATED A1C: CPT | Performed by: FAMILY MEDICINE

## 2018-12-11 PROCEDURE — G8482 FLU IMMUNIZE ORDER/ADMIN: HCPCS | Performed by: FAMILY MEDICINE

## 2018-12-11 PROCEDURE — G8417 CALC BMI ABV UP PARAM F/U: HCPCS | Performed by: FAMILY MEDICINE

## 2018-12-11 RX ORDER — ESCITALOPRAM OXALATE 20 MG/1
TABLET ORAL
Qty: 90 TABLET | Refills: 1 | Status: SHIPPED | OUTPATIENT
Start: 2018-12-11 | End: 2019-06-10 | Stop reason: SDUPTHER

## 2018-12-12 ENCOUNTER — TELEPHONE (OUTPATIENT)
Dept: FAMILY MEDICINE CLINIC | Age: 48
End: 2018-12-12

## 2018-12-12 NOTE — TELEPHONE ENCOUNTER
Called pt & received a message that said \"customer you trying to reach cannot accept calls that this time, please try again later. \"

## 2019-06-09 NOTE — PROGRESS NOTES
PROGRESS NOTE     Helen Arrington MD  [address] [facility]  [place] [address]  [phone] office  [phone] fax    Date of Service:  6/10/2019    Subjective:      Patient ID: . [place] Plaza is a 53 y.o. male      CC: prediabetes, HTN, elevated LFTs, anxiety    HPI     Prediabetes  Patient lost 4 pounds in the last 6 months. Pt denies blurry vision, foot ulcerations, neuropathy, polyphagia, polyuria, polydipsia, nausea, vomiting and diarrhea. Patient trying to stay away from carbs. Pt walks about 8 hours at work. Hypertension:   BP was elevated at last visit, but patient wanted to work on healthy lifestyle for last  Months to avoid medication     Home blood pressure monitoring: No.  He is not adherent to a low sodium diet. Patient denies chest pain, shortness of breath, headache, lightheadedness, blurred vision, peripheral edema, palpitations, dry cough and fatigue. .  Use of agents associated with hypertension: none. Sodium (mmol/L)   Date Value   05/01/2018 143    BUN (mg/dL)   Date Value   05/01/2018 11    Glucose (mg/dL)   Date Value   05/01/2018 86      Potassium (mmol/L)   Date Value   05/01/2018 4.0    CREATININE (mg/dL)   Date Value   05/01/2018 0.6 (L)             Elevated LFTs  Patient's liver enzymes were mildly elevated last year. This was thought to likely be due to fatty liver. As stated above, patient is working on healthy lifestyle changes. Lab Results   Component Value Date    LABA1C 6.0 12/11/2018    LABA1C 6.1 05/01/2018    LABA1C 5.9 11/07/2017     Lab Results   Component Value Date    LABMICR YES 11/06/2017    CREATININE 0.6 (L) 05/01/2018     Lab Results   Component Value Date    ALT 26 12/11/2018    AST 19 12/11/2018     Lab Results   Component Value Date    CHOL 118 05/15/2018    TRIG 62 05/15/2018    HDL 40 05/15/2018    LDLCALC 66 05/15/2018        Anxiety   Current complaints include: none. hepatosplenomegaly  Musculoskeletal:  · All extremities without clubbing, cyanosis or edema  Skin:  · No rashes on inspection  Psych:  · Normal mood and affect  · Normal insight and judgement    Assessment / Plan:     1. Prediabetes   - POCT glycosylated hemoglobin (Hb A1C)=5.8 and some improvement is doing    Discussed the importance of continuing to follow a diabetic diet to prevent worsening. Gave goal to eat no more than 45 g carbs per meal and no more than 15 g of carbs per snack. 2. Anxiety  Well-controlled. Continue Lexapro 20 mg.  - escitalopram (LEXAPRO) 20 MG tablet; TAKE ONE TABLET BY MOUTH DAILY  Dispense: 90 tablet; Refill: 1    3. Elevated LFTs  Repeating LFTs today. Mild elevation likely represents fatty liver, but if they increase further, more evaluation may be needed. - Hepatic Function Panel    4. Essential hypertension  Much improved today. We will continue to monitor. Patient to continue healthy lifestyle changes.         F/u 6 months

## 2019-06-10 ENCOUNTER — OFFICE VISIT (OUTPATIENT)
Dept: FAMILY MEDICINE CLINIC | Age: 49
End: 2019-06-10
Payer: COMMERCIAL

## 2019-06-10 VITALS
BODY MASS INDEX: 36.57 KG/M2 | HEART RATE: 90 BPM | DIASTOLIC BLOOD PRESSURE: 84 MMHG | WEIGHT: 270 LBS | SYSTOLIC BLOOD PRESSURE: 122 MMHG | HEIGHT: 72 IN

## 2019-06-10 DIAGNOSIS — R79.89 ELEVATED LFTS: ICD-10-CM

## 2019-06-10 DIAGNOSIS — R73.03 PREDIABETES: Primary | ICD-10-CM

## 2019-06-10 DIAGNOSIS — I10 ESSENTIAL HYPERTENSION: ICD-10-CM

## 2019-06-10 DIAGNOSIS — F41.9 ANXIETY: ICD-10-CM

## 2019-06-10 LAB
A/G RATIO: 1.4 (ref 1.1–2.2)
ALBUMIN SERPL-MCNC: 4.3 G/DL (ref 3.4–5)
ALP BLD-CCNC: 93 U/L (ref 40–129)
ALT SERPL-CCNC: 18 U/L (ref 10–40)
ANION GAP SERPL CALCULATED.3IONS-SCNC: 13 MMOL/L (ref 3–16)
AST SERPL-CCNC: 18 U/L (ref 15–37)
BILIRUB SERPL-MCNC: 0.3 MG/DL (ref 0–1)
BUN BLDV-MCNC: 17 MG/DL (ref 7–20)
CALCIUM SERPL-MCNC: 9.2 MG/DL (ref 8.3–10.6)
CHLORIDE BLD-SCNC: 103 MMOL/L (ref 99–110)
CO2: 24 MMOL/L (ref 21–32)
CREAT SERPL-MCNC: 1 MG/DL (ref 0.9–1.3)
GFR AFRICAN AMERICAN: >60
GFR NON-AFRICAN AMERICAN: >60
GLOBULIN: 3.1 G/DL
GLUCOSE BLD-MCNC: 108 MG/DL (ref 70–99)
HBA1C MFR BLD: 5.8 %
POTASSIUM SERPL-SCNC: 4.5 MMOL/L (ref 3.5–5.1)
SODIUM BLD-SCNC: 140 MMOL/L (ref 136–145)
TOTAL PROTEIN: 7.4 G/DL (ref 6.4–8.2)

## 2019-06-10 PROCEDURE — 83036 HEMOGLOBIN GLYCOSYLATED A1C: CPT | Performed by: FAMILY MEDICINE

## 2019-06-10 PROCEDURE — 36415 COLL VENOUS BLD VENIPUNCTURE: CPT | Performed by: FAMILY MEDICINE

## 2019-06-10 PROCEDURE — 99214 OFFICE O/P EST MOD 30 MIN: CPT | Performed by: FAMILY MEDICINE

## 2019-06-10 PROCEDURE — G8427 DOCREV CUR MEDS BY ELIG CLIN: HCPCS | Performed by: FAMILY MEDICINE

## 2019-06-10 PROCEDURE — G8417 CALC BMI ABV UP PARAM F/U: HCPCS | Performed by: FAMILY MEDICINE

## 2019-06-10 PROCEDURE — 1036F TOBACCO NON-USER: CPT | Performed by: FAMILY MEDICINE

## 2019-06-10 RX ORDER — ESCITALOPRAM OXALATE 20 MG/1
TABLET ORAL
Qty: 90 TABLET | Refills: 1 | Status: SHIPPED | OUTPATIENT
Start: 2019-06-10 | End: 2020-01-09

## 2020-01-09 RX ORDER — ESCITALOPRAM OXALATE 20 MG/1
TABLET ORAL
Qty: 30 TABLET | Refills: 0 | Status: SHIPPED | OUTPATIENT
Start: 2020-01-09 | End: 2020-02-18 | Stop reason: SDUPTHER

## 2020-02-13 ENCOUNTER — TELEPHONE (OUTPATIENT)
Dept: FAMILY MEDICINE CLINIC | Age: 50
End: 2020-02-13

## 2020-02-13 RX ORDER — ESCITALOPRAM OXALATE 20 MG/1
TABLET ORAL
Qty: 30 TABLET | Refills: 0 | OUTPATIENT
Start: 2020-02-13

## 2020-02-13 NOTE — TELEPHONE ENCOUNTER
Patient requesting a medication refill. Medication  escitalopram (LEXAPRO)  Dosage  20 MG tablet   Frequency TAKE ONE TABLET BY MOUTH DAILY  Last filled on  01/09/20  Pharmacy [address] [place] [address]        PT wanting to let PCP know that he is looking for new insurance and will have it by next month.

## 2020-02-13 NOTE — TELEPHONE ENCOUNTER
Patient called back and he has no insurance right now, he is in the process of getting it, will schedule once established with insurance

## 2020-02-18 RX ORDER — ESCITALOPRAM OXALATE 20 MG/1
20 TABLET ORAL DAILY
Qty: 30 TABLET | Refills: 1 | Status: SHIPPED | OUTPATIENT
Start: 2020-02-18 | End: 2020-04-17

## 2020-02-18 NOTE — TELEPHONE ENCOUNTER
Called last week for refill on lexapro. States med has still not been called in. Working on getting insurance. Please advise.  Please call [facility] back at [phone]

## 2020-04-17 RX ORDER — ESCITALOPRAM OXALATE 20 MG/1
TABLET ORAL
Qty: 30 TABLET | Refills: 0 | Status: SHIPPED | OUTPATIENT
Start: 2020-04-17 | End: 2020-05-15 | Stop reason: SDUPTHER

## 2020-04-17 NOTE — LETTER
[address], [address] [address]  Phone: [phone]  Fax: [phone]    John Cortés MD        April 27, 2020    [address] [place] [address]      Dear Deana Daniel:    Erin Jorge, we received a refill request for your medication and wanted to let you know we filled it but you are due for an office visit. We are doing virtual visits with a smart phone, tablet or laptop right now but also scheduling in June in hopes we will be back to regular in office visits. Please call the office to set up an appointment. If you have any questions or concerns, please don't hesitate to call.     Sincerely,        John Cortés MD

## 2020-05-15 ENCOUNTER — VIRTUAL VISIT (OUTPATIENT)
Dept: FAMILY MEDICINE CLINIC | Age: 50
End: 2020-05-15

## 2020-05-15 PROCEDURE — 99213 OFFICE O/P EST LOW 20 MIN: CPT | Performed by: NURSE PRACTITIONER

## 2020-05-15 RX ORDER — ESCITALOPRAM OXALATE 20 MG/1
TABLET ORAL
Qty: 90 TABLET | Refills: 1 | Status: SHIPPED | OUTPATIENT
Start: 2020-05-15 | End: 2020-11-18

## 2020-05-15 NOTE — PROGRESS NOTES
- escitalopram (LEXAPRO) 20 MG tablet; TAKE ONE TABLET BY MOUTH DAILY  Dispense: 90 tablet; Refill: 1    2. Prediabetes  Will order as future. Encouraged him to focus on decreasing carb intake and increasing exercise. - Hemoglobin A1C; Future    3. Elevated LFTs  Reviewed latest labs which were normal but have been elevated in the past. Will recheck. - Comprehensive Metabolic Panel; Future      No follow-ups on file. Julianna Ross is a 51 y.o. male being evaluated by a Virtual Visit (video visit) encounter to address concerns as mentioned above. A caregiver was present when appropriate. Due to this being a TeleHealth encounter (During [facility] public health emergency), evaluation of the following organ systems was limited: Vitals/Constitutional/EENT/Resp/CV/GI//MS/Neuro/Skin/Heme-Lymph-Imm. Pursuant to the emergency declaration under the [address] [place] [address], [address] [place] [address] authority and the [employer] and [employer] Act, this Virtual Visit was conducted with patient's (and/or legal guardian's) consent, to reduce the patient's risk of exposure to COVID-19 and provide necessary medical care. The patient (and/or legal guardian) has also been advised to contact this office for worsening conditions or problems, and seek emergency medical treatment and/or call 911 if deemed necessary. Patient identification was verified at the start of the visit: Yes    Total time spent on this encounter: 20 min    Services were provided through a video synchronous discussion virtually to substitute for in-person clinic visit. Patient and provider were located at their individual homes. --SANTANA Rose - CNP on 5/15/2020 at 4:04 PM    An electronic signature was used to authenticate this note. ;

## 2020-11-13 NOTE — TELEPHONE ENCOUNTER
Pt overdue for appt.   Ok to use a same day on a day that I have a lot    Please schedule, then change to one month worth of meds and route back

## 2020-11-18 RX ORDER — ESCITALOPRAM OXALATE 20 MG/1
TABLET ORAL
Qty: 30 TABLET | Refills: 0 | Status: SHIPPED | OUTPATIENT
Start: 2020-11-18 | End: 2020-12-09 | Stop reason: SDUPTHER

## 2020-12-09 ENCOUNTER — OFFICE VISIT (OUTPATIENT)
Dept: FAMILY MEDICINE CLINIC | Age: 50
End: 2020-12-09

## 2020-12-09 VITALS
HEIGHT: 72 IN | WEIGHT: 276 LBS | BODY MASS INDEX: 37.38 KG/M2 | SYSTOLIC BLOOD PRESSURE: 125 MMHG | DIASTOLIC BLOOD PRESSURE: 82 MMHG | TEMPERATURE: 97.7 F | HEART RATE: 82 BPM

## 2020-12-09 LAB
A/G RATIO: 1.4 (ref 1.1–2.2)
ALBUMIN SERPL-MCNC: 4.5 G/DL (ref 3.4–5)
ALP BLD-CCNC: 97 U/L (ref 40–129)
ALT SERPL-CCNC: 21 U/L (ref 10–40)
ANION GAP SERPL CALCULATED.3IONS-SCNC: 13 MMOL/L (ref 3–16)
AST SERPL-CCNC: 18 U/L (ref 15–37)
BILIRUB SERPL-MCNC: 0.5 MG/DL (ref 0–1)
BUN BLDV-MCNC: 16 MG/DL (ref 7–20)
CALCIUM SERPL-MCNC: 9.7 MG/DL (ref 8.3–10.6)
CHLORIDE BLD-SCNC: 103 MMOL/L (ref 99–110)
CHOLESTEROL, TOTAL: 179 MG/DL (ref 0–199)
CO2: 24 MMOL/L (ref 21–32)
CREAT SERPL-MCNC: 0.9 MG/DL (ref 0.9–1.3)
GFR AFRICAN AMERICAN: >60
GFR NON-AFRICAN AMERICAN: >60
GLOBULIN: 3.2 G/DL
GLUCOSE BLD-MCNC: 128 MG/DL (ref 70–99)
HDLC SERPL-MCNC: 46 MG/DL (ref 40–60)
LDL CHOLESTEROL CALCULATED: 116 MG/DL
POTASSIUM SERPL-SCNC: 4.1 MMOL/L (ref 3.5–5.1)
SODIUM BLD-SCNC: 140 MMOL/L (ref 136–145)
TOTAL PROTEIN: 7.7 G/DL (ref 6.4–8.2)
TRIGL SERPL-MCNC: 84 MG/DL (ref 0–150)
VLDLC SERPL CALC-MCNC: 17 MG/DL

## 2020-12-09 PROCEDURE — 90686 IIV4 VACC NO PRSV 0.5 ML IM: CPT | Performed by: FAMILY MEDICINE

## 2020-12-09 PROCEDURE — 90471 IMMUNIZATION ADMIN: CPT | Performed by: FAMILY MEDICINE

## 2020-12-09 PROCEDURE — 99214 OFFICE O/P EST MOD 30 MIN: CPT | Performed by: FAMILY MEDICINE

## 2020-12-09 PROCEDURE — 36415 COLL VENOUS BLD VENIPUNCTURE: CPT | Performed by: FAMILY MEDICINE

## 2020-12-09 RX ORDER — ESCITALOPRAM OXALATE 20 MG/1
20 TABLET ORAL DAILY
Qty: 90 TABLET | Refills: 1 | Status: ON HOLD | OUTPATIENT
Start: 2020-12-09 | End: 2021-04-28 | Stop reason: SDUPTHER

## 2020-12-09 ASSESSMENT — PATIENT HEALTH QUESTIONNAIRE - PHQ9
SUM OF ALL RESPONSES TO PHQ9 QUESTIONS 1 & 2: 0
2. FEELING DOWN, DEPRESSED OR HOPELESS: 0
SUM OF ALL RESPONSES TO PHQ QUESTIONS 1-9: 0
1. LITTLE INTEREST OR PLEASURE IN DOING THINGS: 0

## 2020-12-09 NOTE — PROGRESS NOTES
PROGRESS NOTE     Billy Chapa MD  [facility] [place] [address]  [phone] office  [phone] fax    Date of Service:  12/9/2020    Subjective:      Patient ID: Kojo Collier is a 51 y.o. male      CC: prediabetes, obesity,  anxiety    HPI     Prediabetes  Pt denies blurry vision, foot ulcerations, neuropathy, polyphagia, polyuria, polydipsia, nausea, vomiting and diarrhea. Active at work (works at [email or web address]), but no routine exericse. He admits to eating a lot of carbohydrates. Lab Results   Component Value Date    LABA1C 5.8 06/10/2019    LABA1C 6.0 12/11/2018    LABA1C 6.1 05/01/2018     Lab Results   Component Value Date    LABMICR YES 11/06/2017    CREATININE 1.0 06/10/2019     Lab Results   Component Value Date    ALT 18 06/10/2019    AST 18 06/10/2019     Lab Results   Component Value Date    CHOL 118 05/15/2018    TRIG 62 05/15/2018    HDL 40 05/15/2018    LDLCALC 66 05/15/2018        Anxiety   Current complaints include: none. He denies anhedonia, depressed mood, tearfulness, feelings of hopelessness, insomnia, excessive worry, panic attacks, increased use of drugs or alcohol and suicidal thoughts or behavior. Symptoms/signs of toya: none. External stressors: none Current treatment includes: Lexapro- 20mg. Medication side effects: none. Class 2 severe obesity due to excess calories with serious comorbidity and body mass index (BMI) of 37.0 to 37.9 in adult [facility])  As stated above, not getting routine exercise and not following a healthy diet. Does not seem very motivated to make many changes.           Vitals:    12/09/20 1043   BP: 125/82   Pulse: 82   Temp: 97.7 °F (36.5 °C)   TempSrc: Infrared   Weight: 276 lb (125.2 kg)   Height: 6' (1.829 m)       Outpatient Medications Marked as Taking for the 12/9/20 encounter (Office Visit) with Stella Fitzpatrick MD   Medication Sig Dispense Refill    escitalopram (LEXAPRO) 20 MG tablet TAKE ONE TABLET BY MOUTH DAILY 30 tablet 0       Past Medical History:   Diagnosis Date    Anxiety     was on paxil in the past    Elevated LFTs     History of tobacco abuse     Hypertension     Prediabetes        No past surgical history on file. Social History     Tobacco Use    Smoking status: Former Smoker     Packs/day: 2.00     Years: 10.00     Pack years: 20.00     Last attempt to quit: 2007     Years since quittin.5    Smokeless tobacco: Former User   Substance Use Topics    Alcohol use: No     Alcohol/week: 0.0 standard drinks       Family History   Problem Relation Age of Onset    Stroke Father     Asthma Sister     High Blood Pressure Brother            Review of Systems  See hpi    Objective:   Constitutional:   · Reviewed vitals above  · Well Nourished, well developed, no distress       Neck:  · Symmetric and without masses  · No thyromegaly  Resp:  · Normal effort  · Clear to auscultation bilaterally without rhonchi, wheezing or crackles  Cardiovascular:  · On auscultation, normal S1 and S2 without murmurs, rubs or gallops  · No bruits of bilateral carotids and no JVD  Gastrointestinal:  · Nontender, nondistended, and no masses  · No hepatosplenomegaly  Musculoskeletal:  · All extremities without clubbing, cyanosis or edema  Skin:  · No rashes on inspection  Psych:  · Normal mood and affect  · Normal insight and judgement    Assessment / Plan:     1. Prediabetes  Checking A1c. We will follow-up on results and act accordingly. Gave goal to eat no more than 45 g per meal and 15 g per snack of carbs or less. Checking CMP. 2. Anxiety  Well-controlled. Continue Lexapro 20 mg.        3. Class 2 severe obesity due to excess calories with serious comorbidity and body mass index (BMI) of 37.0 to 37.9 in [facility])  Discussed importance of healthy lifestyle change, including exercise and healthy eating.   Patient has had elevated LFTs in the past that resolved with healthy lifestyle. We will repeat today.           HM:    Not completely fasting: Ham sandwich and banana: checking FLP as unlikely to come back fasting    Flu vaccine given today with VIS form    F/u 6 months

## 2020-12-10 LAB
ESTIMATED AVERAGE GLUCOSE: 119.8 MG/DL
HBA1C MFR BLD: 5.8 %

## 2021-03-29 ENCOUNTER — APPOINTMENT (OUTPATIENT)
Dept: CT IMAGING | Age: 51
DRG: 720 | End: 2021-03-29
Payer: MEDICAID

## 2021-03-29 ENCOUNTER — HOSPITAL ENCOUNTER (INPATIENT)
Age: 51
LOS: 3 days | Discharge: HOME | DRG: 720 | End: 2021-04-01
Attending: EMERGENCY MEDICINE | Admitting: INTERNAL MEDICINE
Payer: MEDICAID

## 2021-03-29 ENCOUNTER — OFFICE VISIT (OUTPATIENT)
Dept: FAMILY MEDICINE CLINIC | Age: 51
End: 2021-03-29
Payer: MEDICAID

## 2021-03-29 ENCOUNTER — ANESTHESIA EVENT (OUTPATIENT)
Dept: OPERATING ROOM | Age: 51
DRG: 720 | End: 2021-03-29
Payer: MEDICAID

## 2021-03-29 ENCOUNTER — ANESTHESIA (OUTPATIENT)
Dept: OPERATING ROOM | Age: 51
DRG: 720 | End: 2021-03-29
Payer: MEDICAID

## 2021-03-29 VITALS
DIASTOLIC BLOOD PRESSURE: 68 MMHG | OXYGEN SATURATION: 97 % | WEIGHT: 265 LBS | HEIGHT: 72 IN | TEMPERATURE: 98.4 F | BODY MASS INDEX: 35.89 KG/M2 | SYSTOLIC BLOOD PRESSURE: 136 MMHG | HEART RATE: 100 BPM

## 2021-03-29 VITALS
DIASTOLIC BLOOD PRESSURE: 79 MMHG | OXYGEN SATURATION: 91 % | RESPIRATION RATE: 36 BRPM | SYSTOLIC BLOOD PRESSURE: 171 MMHG

## 2021-03-29 DIAGNOSIS — R61 DIAPHORESIS: ICD-10-CM

## 2021-03-29 DIAGNOSIS — R11.0 NAUSEA: ICD-10-CM

## 2021-03-29 DIAGNOSIS — R79.9 ELEVATED BUN: ICD-10-CM

## 2021-03-29 DIAGNOSIS — R35.0 URINARY FREQUENCY: ICD-10-CM

## 2021-03-29 DIAGNOSIS — R33.9 URINARY RETENTION: ICD-10-CM

## 2021-03-29 DIAGNOSIS — M54.50 ACUTE BILATERAL LOW BACK PAIN WITHOUT SCIATICA: ICD-10-CM

## 2021-03-29 DIAGNOSIS — R79.82 ELEVATED C-REACTIVE PROTEIN (CRP): ICD-10-CM

## 2021-03-29 DIAGNOSIS — N39.0 ACUTE URINARY TRACT INFECTION: Primary | ICD-10-CM

## 2021-03-29 DIAGNOSIS — R30.0 DYSURIA: Primary | ICD-10-CM

## 2021-03-29 DIAGNOSIS — M54.50 ACUTE MIDLINE LOW BACK PAIN WITHOUT SCIATICA: ICD-10-CM

## 2021-03-29 DIAGNOSIS — R70.0 ELEVATED ERYTHROCYTE SEDIMENTATION RATE: ICD-10-CM

## 2021-03-29 DIAGNOSIS — D72.829 LEUKOCYTOSIS, UNSPECIFIED TYPE: ICD-10-CM

## 2021-03-29 PROBLEM — N30.01 ACUTE CYSTITIS WITH HEMATURIA: Status: ACTIVE | Noted: 2021-03-29

## 2021-03-29 PROBLEM — E66.01 MORBID OBESITY DUE TO EXCESS CALORIES (HCC): Status: ACTIVE | Noted: 2021-03-29

## 2021-03-29 PROBLEM — N13.30 BILATERAL HYDRONEPHROSIS: Status: ACTIVE | Noted: 2021-03-29

## 2021-03-29 PROBLEM — R00.0 TACHYCARDIA: Status: ACTIVE | Noted: 2021-03-29

## 2021-03-29 PROBLEM — A41.9 SEPSIS (HCC): Status: ACTIVE | Noted: 2021-03-29

## 2021-03-29 PROBLEM — D72.9 NEUTROPHILIC LEUKOCYTOSIS: Status: ACTIVE | Noted: 2021-03-29

## 2021-03-29 PROBLEM — D72.828 NEUTROPHILIC LEUKOCYTOSIS: Status: ACTIVE | Noted: 2021-03-29

## 2021-03-29 LAB
ALBUMIN SERPL-MCNC: 4 G/DL (ref 3.4–5)
ALP BLD-CCNC: 81 U/L (ref 40–129)
ALT SERPL-CCNC: 19 U/L (ref 10–40)
ANION GAP SERPL CALCULATED.3IONS-SCNC: 16 MMOL/L (ref 3–16)
AST SERPL-CCNC: 32 U/L (ref 15–37)
BACTERIA: ABNORMAL /HPF
BASE EXCESS VENOUS: -0.2 MMOL/L
BASOPHILS ABSOLUTE: 0.1 K/UL (ref 0–0.2)
BASOPHILS RELATIVE PERCENT: 0.4 %
BILIRUB SERPL-MCNC: 0.8 MG/DL (ref 0–1)
BILIRUBIN DIRECT: <0.2 MG/DL (ref 0–0.3)
BILIRUBIN URINE: NEGATIVE
BILIRUBIN, INDIRECT: ABNORMAL MG/DL (ref 0–1)
BLOOD, URINE: ABNORMAL
BUN BLDV-MCNC: 21 MG/DL (ref 7–20)
C-REACTIVE PROTEIN: 89.2 MG/L (ref 0–5.1)
CALCIUM SERPL-MCNC: 9.5 MG/DL (ref 8.3–10.6)
CARBOXYHEMOGLOBIN: 1.5 %
CHLORIDE BLD-SCNC: 98 MMOL/L (ref 99–110)
CLARITY: ABNORMAL
CO2: 22 MMOL/L (ref 21–32)
COLOR: YELLOW
COMMENT UA: ABNORMAL
CREAT SERPL-MCNC: 1 MG/DL (ref 0.9–1.3)
EOSINOPHILS ABSOLUTE: 0 K/UL (ref 0–0.6)
EOSINOPHILS RELATIVE PERCENT: 0 %
EPITHELIAL CELLS, UA: ABNORMAL /HPF (ref 0–5)
GFR AFRICAN AMERICAN: >60
GFR NON-AFRICAN AMERICAN: >60
GLUCOSE BLD-MCNC: 137 MG/DL (ref 70–99)
GLUCOSE URINE: NEGATIVE MG/DL
HCO3 VENOUS: 25 MMOL/L (ref 23–29)
HCT VFR BLD CALC: 36.3 % (ref 40.5–52.5)
HEMOGLOBIN: 12.1 G/DL (ref 13.5–17.5)
KETONES, URINE: 15 MG/DL
LACTIC ACID: 1.2 MMOL/L (ref 0.4–2)
LEUKOCYTE ESTERASE, URINE: ABNORMAL
LIPASE: 10 U/L (ref 13–60)
LYMPHOCYTES ABSOLUTE: 1.2 K/UL (ref 1–5.1)
LYMPHOCYTES RELATIVE PERCENT: 4.5 %
MCH RBC QN AUTO: 27.3 PG (ref 26–34)
MCHC RBC AUTO-ENTMCNC: 33.2 G/DL (ref 31–36)
MCV RBC AUTO: 82 FL (ref 80–100)
METHEMOGLOBIN VENOUS: 0.3 %
MICROSCOPIC EXAMINATION: YES
MONOCYTES ABSOLUTE: 1.1 K/UL (ref 0–1.3)
MONOCYTES RELATIVE PERCENT: 4.3 %
MUCUS: ABNORMAL /LPF
NEUTROPHILS ABSOLUTE: 23.5 K/UL (ref 1.7–7.7)
NEUTROPHILS RELATIVE PERCENT: 90.8 %
NITRITE, URINE: NEGATIVE
O2 CONTENT, VEN: 12 ML/DL
O2 SAT, VEN: 74 %
O2 THERAPY: NORMAL
PCO2, VEN: 40.4 MMHG (ref 40–50)
PDW BLD-RTO: 12.9 % (ref 12.4–15.4)
PH UA: 5.5 (ref 5–8)
PH VENOUS: 7.39 (ref 7.35–7.45)
PLATELET # BLD: 348 K/UL (ref 135–450)
PMV BLD AUTO: 9.4 FL (ref 5–10.5)
PO2, VEN: 40 MMHG
POTASSIUM REFLEX MAGNESIUM: 3.7 MMOL/L (ref 3.5–5.1)
PRO-BNP: 148 PG/ML (ref 0–124)
PROSTATE SPECIFIC ANTIGEN: 2.23 NG/ML (ref 0–4)
PROTEIN UA: 30 MG/DL
RBC # BLD: 4.43 M/UL (ref 4.2–5.9)
RBC UA: ABNORMAL /HPF (ref 0–4)
SARS-COV-2, NAAT: NOT DETECTED
SEDIMENTATION RATE, ERYTHROCYTE: 97 MM/HR (ref 0–15)
SODIUM BLD-SCNC: 136 MMOL/L (ref 136–145)
SPECIFIC GRAVITY UA: >=1.03 (ref 1–1.03)
TCO2 CALC VENOUS: 26 MMOL/L
TOTAL PROTEIN: 8.5 G/DL (ref 6.4–8.2)
TROPONIN: <0.01 NG/ML
URINE REFLEX TO CULTURE: YES
URINE TYPE: ABNORMAL
UROBILINOGEN, URINE: 0.2 E.U./DL
WBC # BLD: 25.9 K/UL (ref 4–11)
WBC UA: >100 /HPF (ref 0–5)

## 2021-03-29 PROCEDURE — 6360000004 HC RX CONTRAST MEDICATION

## 2021-03-29 PROCEDURE — 6360000002 HC RX W HCPCS: Performed by: EMERGENCY MEDICINE

## 2021-03-29 PROCEDURE — 80076 HEPATIC FUNCTION PANEL: CPT

## 2021-03-29 PROCEDURE — 2500000003 HC RX 250 WO HCPCS: Performed by: ANESTHESIOLOGY

## 2021-03-29 PROCEDURE — 99284 EMERGENCY DEPT VISIT MOD MDM: CPT

## 2021-03-29 PROCEDURE — 80048 BASIC METABOLIC PNL TOTAL CA: CPT

## 2021-03-29 PROCEDURE — 86140 C-REACTIVE PROTEIN: CPT

## 2021-03-29 PROCEDURE — 85025 COMPLETE CBC W/AUTO DIFF WBC: CPT

## 2021-03-29 PROCEDURE — 6360000002 HC RX W HCPCS: Performed by: ANESTHESIOLOGY

## 2021-03-29 PROCEDURE — 7100000001 HC PACU RECOVERY - ADDTL 15 MIN: Performed by: UROLOGY

## 2021-03-29 PROCEDURE — 3600000012 HC SURGERY LEVEL 2 ADDTL 15MIN: Performed by: UROLOGY

## 2021-03-29 PROCEDURE — 87635 SARS-COV-2 COVID-19 AMP PRB: CPT

## 2021-03-29 PROCEDURE — 85652 RBC SED RATE AUTOMATED: CPT

## 2021-03-29 PROCEDURE — 7100000000 HC PACU RECOVERY - FIRST 15 MIN: Performed by: UROLOGY

## 2021-03-29 PROCEDURE — 3700000000 HC ANESTHESIA ATTENDED CARE: Performed by: UROLOGY

## 2021-03-29 PROCEDURE — 96365 THER/PROPH/DIAG IV INF INIT: CPT

## 2021-03-29 PROCEDURE — 81001 URINALYSIS AUTO W/SCOPE: CPT

## 2021-03-29 PROCEDURE — 2580000003 HC RX 258: Performed by: UROLOGY

## 2021-03-29 PROCEDURE — 83605 ASSAY OF LACTIC ACID: CPT

## 2021-03-29 PROCEDURE — 74177 CT ABD & PELVIS W/CONTRAST: CPT

## 2021-03-29 PROCEDURE — 3700000001 HC ADD 15 MINUTES (ANESTHESIA): Performed by: UROLOGY

## 2021-03-29 PROCEDURE — 0T9B40Z DRAINAGE OF BLADDER WITH DRAINAGE DEVICE, PERCUTANEOUS ENDOSCOPIC APPROACH: ICD-10-PCS | Performed by: UROLOGY

## 2021-03-29 PROCEDURE — 84484 ASSAY OF TROPONIN QUANT: CPT

## 2021-03-29 PROCEDURE — 2709999900 HC NON-CHARGEABLE SUPPLY: Performed by: UROLOGY

## 2021-03-29 PROCEDURE — 87086 URINE CULTURE/COLONY COUNT: CPT

## 2021-03-29 PROCEDURE — 87040 BLOOD CULTURE FOR BACTERIA: CPT

## 2021-03-29 PROCEDURE — 36415 COLL VENOUS BLD VENIPUNCTURE: CPT

## 2021-03-29 PROCEDURE — 0TJB8ZZ INSPECTION OF BLADDER, VIA NATURAL OR ARTIFICIAL OPENING ENDOSCOPIC: ICD-10-PCS | Performed by: UROLOGY

## 2021-03-29 PROCEDURE — 84153 ASSAY OF PSA TOTAL: CPT

## 2021-03-29 PROCEDURE — 83880 ASSAY OF NATRIURETIC PEPTIDE: CPT

## 2021-03-29 PROCEDURE — 83690 ASSAY OF LIPASE: CPT

## 2021-03-29 PROCEDURE — 2580000003 HC RX 258: Performed by: ANESTHESIOLOGY

## 2021-03-29 PROCEDURE — 1200000000 HC SEMI PRIVATE

## 2021-03-29 PROCEDURE — 3600000002 HC SURGERY LEVEL 2 BASE: Performed by: UROLOGY

## 2021-03-29 PROCEDURE — C1769 GUIDE WIRE: HCPCS

## 2021-03-29 PROCEDURE — 96375 TX/PRO/DX INJ NEW DRUG ADDON: CPT

## 2021-03-29 PROCEDURE — 93005 ELECTROCARDIOGRAM TRACING: CPT | Performed by: EMERGENCY MEDICINE

## 2021-03-29 PROCEDURE — 82803 BLOOD GASES ANY COMBINATION: CPT

## 2021-03-29 PROCEDURE — 99215 OFFICE O/P EST HI 40 MIN: CPT | Performed by: FAMILY MEDICINE

## 2021-03-29 PROCEDURE — 2580000003 HC RX 258: Performed by: EMERGENCY MEDICINE

## 2021-03-29 RX ORDER — SODIUM CHLORIDE 0.9 % (FLUSH) 0.9 %
10 SYRINGE (ML) INJECTION EVERY 12 HOURS SCHEDULED
Status: DISCONTINUED | OUTPATIENT
Start: 2021-03-30 | End: 2021-04-01 | Stop reason: HOSPADM

## 2021-03-29 RX ORDER — SODIUM CHLORIDE 9 MG/ML
25 INJECTION, SOLUTION INTRAVENOUS PRN
Status: DISCONTINUED | OUTPATIENT
Start: 2021-03-29 | End: 2021-04-01 | Stop reason: HOSPADM

## 2021-03-29 RX ORDER — OXYCODONE HYDROCHLORIDE AND ACETAMINOPHEN 5; 325 MG/1; MG/1
1 TABLET ORAL PRN
Status: DISCONTINUED | OUTPATIENT
Start: 2021-03-29 | End: 2021-03-29 | Stop reason: HOSPADM

## 2021-03-29 RX ORDER — FENTANYL CITRATE 50 UG/ML
25 INJECTION, SOLUTION INTRAMUSCULAR; INTRAVENOUS EVERY 5 MIN PRN
Status: DISCONTINUED | OUTPATIENT
Start: 2021-03-29 | End: 2021-03-29 | Stop reason: HOSPADM

## 2021-03-29 RX ORDER — PROPOFOL 10 MG/ML
INJECTION, EMULSION INTRAVENOUS CONTINUOUS PRN
Status: DISCONTINUED | OUTPATIENT
Start: 2021-03-29 | End: 2021-03-29 | Stop reason: SDUPTHER

## 2021-03-29 RX ORDER — PROPOFOL 10 MG/ML
INJECTION, EMULSION INTRAVENOUS PRN
Status: DISCONTINUED | OUTPATIENT
Start: 2021-03-29 | End: 2021-03-29 | Stop reason: SDUPTHER

## 2021-03-29 RX ORDER — FENTANYL CITRATE 50 UG/ML
INJECTION, SOLUTION INTRAMUSCULAR; INTRAVENOUS PRN
Status: DISCONTINUED | OUTPATIENT
Start: 2021-03-29 | End: 2021-03-29 | Stop reason: SDUPTHER

## 2021-03-29 RX ORDER — SODIUM CHLORIDE 9 MG/ML
INJECTION, SOLUTION INTRAVENOUS CONTINUOUS
Status: DISCONTINUED | OUTPATIENT
Start: 2021-03-30 | End: 2021-03-31

## 2021-03-29 RX ORDER — TAMSULOSIN HYDROCHLORIDE 0.4 MG/1
0.4 CAPSULE ORAL DAILY
Status: DISCONTINUED | OUTPATIENT
Start: 2021-03-29 | End: 2021-03-29

## 2021-03-29 RX ORDER — FENTANYL CITRATE 50 UG/ML
25 INJECTION, SOLUTION INTRAMUSCULAR; INTRAVENOUS ONCE
Status: COMPLETED | OUTPATIENT
Start: 2021-03-29 | End: 2021-03-29

## 2021-03-29 RX ORDER — ACETAMINOPHEN 650 MG/1
650 SUPPOSITORY RECTAL EVERY 4 HOURS PRN
Status: DISCONTINUED | OUTPATIENT
Start: 2021-03-29 | End: 2021-04-01 | Stop reason: HOSPADM

## 2021-03-29 RX ORDER — ONDANSETRON 2 MG/ML
4 INJECTION INTRAMUSCULAR; INTRAVENOUS
Status: DISCONTINUED | OUTPATIENT
Start: 2021-03-29 | End: 2021-03-29 | Stop reason: HOSPADM

## 2021-03-29 RX ORDER — SODIUM CHLORIDE 9 MG/ML
INJECTION, SOLUTION INTRAVENOUS CONTINUOUS PRN
Status: DISCONTINUED | OUTPATIENT
Start: 2021-03-29 | End: 2021-03-29 | Stop reason: SDUPTHER

## 2021-03-29 RX ORDER — ACETAMINOPHEN 325 MG/1
650 TABLET ORAL EVERY 4 HOURS PRN
Status: DISCONTINUED | OUTPATIENT
Start: 2021-03-29 | End: 2021-04-01 | Stop reason: HOSPADM

## 2021-03-29 RX ORDER — SODIUM CHLORIDE 0.9 % (FLUSH) 0.9 %
10 SYRINGE (ML) INJECTION PRN
Status: DISCONTINUED | OUTPATIENT
Start: 2021-03-29 | End: 2021-04-01 | Stop reason: HOSPADM

## 2021-03-29 RX ORDER — ONDANSETRON 2 MG/ML
INJECTION INTRAMUSCULAR; INTRAVENOUS PRN
Status: DISCONTINUED | OUTPATIENT
Start: 2021-03-29 | End: 2021-03-29 | Stop reason: SDUPTHER

## 2021-03-29 RX ORDER — OXYCODONE HYDROCHLORIDE AND ACETAMINOPHEN 5; 325 MG/1; MG/1
2 TABLET ORAL PRN
Status: DISCONTINUED | OUTPATIENT
Start: 2021-03-29 | End: 2021-03-29 | Stop reason: HOSPADM

## 2021-03-29 RX ORDER — ESCITALOPRAM OXALATE 10 MG/1
20 TABLET ORAL DAILY
Status: DISCONTINUED | OUTPATIENT
Start: 2021-03-30 | End: 2021-04-01 | Stop reason: HOSPADM

## 2021-03-29 RX ORDER — MAGNESIUM HYDROXIDE 1200 MG/15ML
LIQUID ORAL
Status: COMPLETED | OUTPATIENT
Start: 2021-03-29 | End: 2021-03-29

## 2021-03-29 RX ORDER — POLYETHYLENE GLYCOL 3350 17 G/17G
17 POWDER, FOR SOLUTION ORAL DAILY PRN
Status: DISCONTINUED | OUTPATIENT
Start: 2021-03-29 | End: 2021-04-01 | Stop reason: HOSPADM

## 2021-03-29 RX ORDER — KETAMINE HCL IN NACL, ISO-OSM 100MG/10ML
SYRINGE (ML) INJECTION PRN
Status: DISCONTINUED | OUTPATIENT
Start: 2021-03-29 | End: 2021-03-29 | Stop reason: SDUPTHER

## 2021-03-29 RX ORDER — ONDANSETRON 2 MG/ML
4 INJECTION INTRAMUSCULAR; INTRAVENOUS EVERY 4 HOURS PRN
Status: DISCONTINUED | OUTPATIENT
Start: 2021-03-29 | End: 2021-04-01 | Stop reason: HOSPADM

## 2021-03-29 RX ADMIN — PROPOFOL 120 MCG/KG/MIN: 10 INJECTION, EMULSION INTRAVENOUS at 22:31

## 2021-03-29 RX ADMIN — FENTANYL CITRATE 50 MCG: 50 INJECTION INTRAMUSCULAR; INTRAVENOUS at 22:37

## 2021-03-29 RX ADMIN — CEFTRIAXONE 1000 MG: 1 INJECTION, POWDER, FOR SOLUTION INTRAMUSCULAR; INTRAVENOUS at 18:42

## 2021-03-29 RX ADMIN — FENTANYL CITRATE 50 MCG: 50 INJECTION INTRAMUSCULAR; INTRAVENOUS at 22:46

## 2021-03-29 RX ADMIN — ONDANSETRON 4 MG: 2 INJECTION INTRAMUSCULAR; INTRAVENOUS at 22:35

## 2021-03-29 RX ADMIN — Medication 30 MG: at 22:32

## 2021-03-29 RX ADMIN — SODIUM CHLORIDE: 9 INJECTION, SOLUTION INTRAVENOUS at 22:26

## 2021-03-29 RX ADMIN — PROPOFOL 70 MG: 10 INJECTION, EMULSION INTRAVENOUS at 22:31

## 2021-03-29 RX ADMIN — FENTANYL CITRATE 50 MCG: 50 INJECTION INTRAMUSCULAR; INTRAVENOUS at 22:26

## 2021-03-29 RX ADMIN — FENTANYL CITRATE 50 MCG: 50 INJECTION INTRAMUSCULAR; INTRAVENOUS at 22:42

## 2021-03-29 RX ADMIN — IOPAMIDOL 75 ML: 755 INJECTION, SOLUTION INTRAVENOUS at 17:44

## 2021-03-29 RX ADMIN — FENTANYL CITRATE 25 MCG: 50 INJECTION, SOLUTION INTRAMUSCULAR; INTRAVENOUS at 21:04

## 2021-03-29 ASSESSMENT — PULMONARY FUNCTION TESTS
PIF_VALUE: 0
PIF_VALUE: 1
PIF_VALUE: 0
PIF_VALUE: 1
PIF_VALUE: 0

## 2021-03-29 ASSESSMENT — PAIN SCALES - GENERAL
PAINLEVEL_OUTOF10: 7
PAINLEVEL_OUTOF10: 4

## 2021-03-29 ASSESSMENT — PATIENT HEALTH QUESTIONNAIRE - PHQ9
SUM OF ALL RESPONSES TO PHQ QUESTIONS 1-9: 0
SUM OF ALL RESPONSES TO PHQ QUESTIONS 1-9: 0
2. FEELING DOWN, DEPRESSED OR HOPELESS: 0
SUM OF ALL RESPONSES TO PHQ QUESTIONS 1-9: 0
SUM OF ALL RESPONSES TO PHQ9 QUESTIONS 1 & 2: 0

## 2021-03-29 ASSESSMENT — ENCOUNTER SYMPTOMS
EYES NEGATIVE: 1
SHORTNESS OF BREATH: 0
COUGH: 0
ABDOMINAL PAIN: 1

## 2021-03-29 ASSESSMENT — PAIN DESCRIPTION - FREQUENCY: FREQUENCY: CONTINUOUS

## 2021-03-29 ASSESSMENT — PAIN DESCRIPTION - ONSET: ONSET: GRADUAL

## 2021-03-29 ASSESSMENT — PAIN DESCRIPTION - LOCATION: LOCATION: BACK

## 2021-03-29 ASSESSMENT — PAIN DESCRIPTION - DESCRIPTORS: DESCRIPTORS: DISCOMFORT

## 2021-03-29 NOTE — PROGRESS NOTES
PROGRESS NOTE     Cristiane Villafana MD  [address] [facility] [address]  [place] [facility] [email or web address]   [phone] office  [phone] fax    Date of Service:  3/29/2021     Patient ID: Kojo Brink is a 53 y.o. male      Assessment / Plan:     Dysuria, urinary freq, abd pain, lower abd pain, diaphoresis with nausea and lightheadedness  Initially was going to try to do outpatient work-up. Unable to get enough urine to even get a urinalysis more less a urine culture. Was going to check blood work, but after full evaluation and examination, I am very concerned that something more serious is going on. Patient has been a very poor historian in the past.  His presenting symptom with lumbar osteomyelitis in the past was weight loss, because he would not get off the couch to go to the kitchen to eat because his back hurts so much, yet failed to even mention that he was having back pain when he first came in to see me. I am concerned with his history today. It was very hard to pull out the different symptoms that he was having. He just came in stating he is having prostate trouble, but clearly does not feel well, his diuretic, and very pale. I am concerned with him driving himself to the emergency room, but he is refusing to go by squad. He is not orthostatic, but just looks very pale. He does have significant abdominal pain, and will need CT scan imaging, as well as blood work looking for infection, signs of cancer. Also will likely need bladder scanning to see if he truly is having urinary obstruction since he constantly feels like he has to urinate, yet is unable to. Will need the emergency room in order to accomplish this. He is down 11 pounds since he was last here 3 and half months ago. Unclear if this is acute or more chronic weight loss.     Called and spoke with Dr. Marisa Ornelas at [facility] emergency room, she is going to call and let me know if he does not arrive and will also call with update. 45 minutes of face-to-face in nonface-to-face time were spent during patient's care today. Subjective:     CC: Urinary frequency without the ability to produce urine, nausea, loose bowels, low back pain, abdominal pain    HPI    56-year-old white male with past medical history of prediabetes, anxiety, and hypertension, presenting with a complaint that \"my prostate is acting up \". Patient is quite a poor historian, and have had trouble in the past figuring out what is going on with him based on his presenting complaints, only to find out he has many more associated symptoms on further questioning. Today seems to be very similar. He states he has had dysuria for many years. States he has had urinary frequency for the last 4 months with nocturia as well. Over the last 2 days, he has become much more ill, very nauseous, lightheaded, is sweaty on arrival.  States for the last 2 days he has feels like he has to urinate every 30 minutes but only dribbles come out. States he has to strain to pass urine and when he strains he tends to have a loose bowel movement. States he was laying in bed the last 2 days because he did not feel well and felt so nauseous. He is having some associated low back pain, but feels that this is secondary to the straining he has been doing to urinate. He denied any other symptoms, though clearly had abdominal pain with palpation. Denied vomiting, bright red blood per rectum, melena. No other associated symptoms, see negative review of systems below.       ROS:     Constitutional:  +activity or appetite change, denies fever or chills  HENT:  Negative for congestion, sinus pressure, or rhinorrhea  Eyes:  Negative for eye pain or visual changes  Resp:  Negative for SOB, chest tightness, cough  Cardiovascular: Negative for CP, palpitations, SANTOS, orthopnea, PND, LE edema  Gastrointestinal: Negative for  melena, BRBPR, +nausea, loose BMs  : + dysuria, urinary

## 2021-03-29 NOTE — ED NOTES
Unable to complete catheter (coude) at this time due to resistance. Chip Rodarte MD made aware.      Gisell Wilkins RN  03/29/21 1952

## 2021-03-29 NOTE — ED TRIAGE NOTES
Pt arrived to room 17. Pt c/o prostate issues. Pt states he has been having trouble urinating and lower back pain. Pt states when he tries to urinate he has difficulty starting and then sometimes he has a bowel movement unexpectedly when he tries to urinate.  Pt c/o lower back pain discomfort 4/10

## 2021-03-29 NOTE — ED PROVIDER NOTES
organization: None     Attends meetings of clubs or organizations: None     Relationship status: None    Intimate partner violence     Fear of current or ex partner: None     Emotionally abused: None     Physically abused: None     Forced sexual activity: None   Other Topics Concern    None   Social History Narrative    None     SCREENINGS         PHYSICAL EXAM  (up to 7 for level 4, 8 or more for level 5)   INITIAL VITALS: BP: (!) 164/94, Temp: 97.5 °F (36.4 °C), Pulse: 96, Resp: 18, SpO2: 95 %   Physical Exam  Vitals signs reviewed. Constitutional:       Appearance: He is not toxic-appearing. HENT:      Head: Normocephalic and atraumatic. Right Ear: External ear normal.      Left Ear: External ear normal.      Nose: Nose normal.   Eyes:      General: No scleral icterus. Right eye: No discharge. Left eye: No discharge. Extraocular Movements: Extraocular movements intact. Conjunctiva/sclera: Conjunctivae normal.      Pupils: Pupils are equal, round, and reactive to light. Neck:      Musculoskeletal: Normal range of motion. No neck rigidity. Trachea: No tracheal deviation. Cardiovascular:      Rate and Rhythm: Normal rate. Pulses: Normal pulses. Pulmonary:      Effort: Pulmonary effort is normal. No respiratory distress. Abdominal:      General: There is no abdominal bruit. Tenderness: There is abdominal tenderness in the suprapubic area. There is no right CVA tenderness, left CVA tenderness or rebound. Musculoskeletal: Normal range of motion. Right lower leg: No edema. Left lower leg: No edema. Skin:     General: Skin is warm and dry. Capillary Refill: Capillary refill takes 2 to 3 seconds. Neurological:      General: No focal deficit present. Mental Status: He is alert and oriented to person, place, and time. Psychiatric:         Mood and Affect: Mood normal.         Behavior: Behavior normal.       DIAGNOSTIC RESULTS   EKG:  All EKG's are interpreted by the Emergency Department Physician who either signs or Co-signs this chart in the absence of a cardiologist.  Indication: Abdominal pain  Interpretation: Rate 92, rhythm sinus, axis normal.  WV/QTc within normal limits. QRS prolonged 102. No T/ST changes consistent with acute ischemia. Comparison to prior EKG from May 10, 2014 shows no acute ischemic changes noted. RADIOLOGY:   Interpretation per Radiologist below, if available at the time of this note:  CT ABDOMEN PELVIS W IV CONTRAST Additional Contrast? None   Final Result   Bladder is significantly distended to above the level of the iliac crest.      Mild hydronephrosis bilaterally which is likely related to bladder distension. Inflammatory changes involving the penis which are incompletely evaluated. Urethra appears prominent in the prostate and penis with a few cystic lesions   in the soft tissues of the penis which are incompletely evaluated. These may   communicate with urethra and represent diverticula. Recommend urologic   consultation. Mildly enlarged inguinal lymph nodes bilaterally which may be reactive.          MRI THORACIC SPINE W WO CONTRAST    (Results Pending)   MRI LUMBAR SPINE W WO CONTRAST    (Results Pending)     LABS:  Labs Reviewed   CBC WITH AUTO DIFFERENTIAL - Abnormal; Notable for the following components:       Result Value    WBC 25.9 (*)     Hemoglobin 12.1 (*)     Hematocrit 36.3 (*)     Neutrophils Absolute 23.5 (*)     All other components within normal limits    Narrative:     Performed at:  [address] [address]   Phone [phone]   BASIC METABOLIC PANEL W/ REFLEX TO MG FOR LOW K - Abnormal; Notable for the following components:    Chloride 98 (*)     Glucose 137 (*)     BUN 21 (*)     All other components within normal limits    Narrative:     Performed at:  [facility]  [address] [place] [address] Ghadachan Arriaga, [facility] [employer]   Phone [phone]   HEPATIC FUNCTION PANEL - Abnormal; Notable for the following components:     Total Protein 8.5 (*)     All other components within normal limits    Narrative:     Performed at:  [place] [address] [address], [facility] [employer]   Phone [phone]   LIPASE - Abnormal; Notable for the following components:    Lipase 10.0 (*)     All other components within normal limits    Narrative:     Performed at:  [address] [facility] [employer]   Phone [phone]   URINE RT REFLEX TO CULTURE - Abnormal; Notable for the following components:    Clarity, UA TURBID (*)     Ketones, Urine 15 (*)     Blood, Urine MODERATE (*)     Protein, UA 30 (*)     Leukocyte Esterase, Urine MODERATE (*)     All other components within normal limits    Narrative:     Performed at:  [address] [place]   Phone [phone]   BRAIN NATRIURETIC PEPTIDE - Abnormal; Notable for the following components:    Pro- (*)     All other components within normal limits    Narrative:     Performed at:  [address] [facility] [employer]   Phone [phone]   MICROSCOPIC URINALYSIS - Abnormal; Notable for the following components:    Mucus, UA 1+ (*)     WBC, UA >100 (*)     Bacteria, UA 3+ (*)     All other components within normal limits    Narrative:     Performed at:  [address] [place]   Phone [phone]   C-REACTIVE PROTEIN - Abnormal; Notable for the following components:    CRP 89.2 (*)     All other components within normal limits    Narrative:     Performed at:  [address] [employer]   Phone [phone]   SEDIMENTATION RATE - Abnormal; Notable for the following components:    Sed Rate 97 (*)     All other components within normal limits    Narrative:     Performed at:  [facility]  [address] [place], [place] [address]   Phone [phone]   CULTURE, URINE   TROPONIN    Narrative:     Performed at:  [place] [address] [place], [place] [address]   Phone [phone]   LACTIC ACID, PLASMA    Narrative:     Performed at:  [place] [address] [place], [place] [address]   Phone [phone]   BLOOD GAS, VENOUS    Narrative:     Performed at:  [address] [address]   Phone [phone]     All other labs were within normal range or not returned as of this dictation. EMERGENCY DEPARTMENT COURSE and DIFFERENTIAL DIAGNOSIS/MDM:   Patient was given the following medications:  Orders Placed This Encounter   Medications    iopamidol (ISOVUE-370) 76 % injection 75 mL    cefTRIAXone (ROCEPHIN) 1000 mg IVPB in 50 mL D5W minibag     Order Specific Question:   Antimicrobial Indications     Answer:   Urinary Tract Infection    fentaNYL (SUBLIMAZE) injection 25 mcg     CONSULTS:  IP CONSULT TO UROLOGY    INITIAL VITALS: BP: (!) 164/94, Temp: 97.5 °F (36.4 °C), Pulse: 96, Resp: 18, SpO2: 95 %   RECENT VITALS:  BP: (!) 184/86,Temp: 97.5 °F (36.4 °C), Pulse: 85, Resp: 18, SpO2: 97 %     Diana Bermeo is a 53 y.o. male who presents to the emergency department at the request of his primary care physician who he saw today. I did receive a phone call from his primary care physician who reported she was sending him in due to multiple concerns. She states he recently has had a 10 pound weight loss, he was diaphoretic and pale in the office. Reporting he could not urinate for the last few days.   On exam had low back pain and bilateral lower quadrant abdominal tenderness. She attempted to send patient here by ambulance however he refused. On arrival he is Awake, alert, oriented with vitals that are notable for blood pressure 164/94 otherwise hemodynamically stable. On exam he has significant tenderness to the suprapubic area as well as discomfort to her low back palpation. When I enter the room she had been trying to urinate, had urinated less than 5 mL, he stated in the effort to attempt to urinate he had unfortunately pooped himself. He was given white piece to clean off and given new pants as well. A peripheral IV was placed, labs were ordered along with EKG, CT abdomen and pelvis. EKG without acute ischemic changes noted. Labs show elevated BUN of 1, creatinine stable 1. Hepatic panel largely unremarkable. He has an elevated white count of 26. Urinalysis with elevated specific gravity, moderate leuks, negative nitrate, greater than 100 white cells. CT scan of the abdomen shows multiple abnormalities including significantly distended bladder, mild hydronephrosis bilaterally likely related to bladder distention. There are changes of the penis which are inflammatory and incompletely evaluated. The urethra appears prominent in the prostate and penis with a few cystic lesions in the soft tissue of the penis which are incompletely evaluated. May represent diverticula. Mildly enlarged inguinal lymph nodes bilaterally potentially reactive. Radiology made recommendation for urologic consultation. Urology was consulted and agreed with placing a Duran as well as starting him on antibiotics and Flomax. After they reviewed the images themselves they called back and after further discussion of patient's exam continue with recommendations of antibiotics and Duran however also recommended admission.     ESR and CRP both came back elevated, while this may be due to his urinary tract infection given the elevation in conjunction with his low back pain and history of prior spine infection I did feel prompted to order an MRI. Unfortunately nursing was unable to pass coudé catheter. Urology was called back and she did come to evaluate patient at the bedside to place one. Discussed admission with patient who is in agreement. Sent message to hospitalist notified of admission through perfect serve. Call back placed to patient primary care, Dr. George Juan, for update on patient. Addendum: Unfortunately the surgeon was also unable to place a Duran at the bedside necessitating patient to go to the OR from the ED. CRITICAL CARE TIME   Due to the immediate potential for life-threatening deterioration due to urinary retention with infection and history of spine infection, I spent 48 minutes providing critical care. There was a high probability of clinically significant/life threatening deterioration in the patient's condition which required my urgent intervention. This time excludes time spent performing procedures but includes time spent on direct patient care, history retrieval, review of the chart, and discussions with patient, family, and consultant(s). FINAL IMPRESSION      1. Acute urinary tract infection    2. Elevated erythrocyte sedimentation rate    3. Elevated C-reactive protein (CRP)    4. Urinary retention    5. Leukocytosis, unspecified type    6. Elevated BUN    7.  Acute midline low back pain without sciatica        DISPOSITION/PLAN   DISPOSITION  Admission          (Please note that portions of this note were completed with a voice recognition program. Efforts were made to edit the dictations but occasionally words are mis-transcribed.)    Tracy Elliott MD (electronically signed)  Attending Emergency Physician      Tracy Elliott MD  03/30/21 6532

## 2021-03-30 ENCOUNTER — APPOINTMENT (OUTPATIENT)
Dept: MRI IMAGING | Age: 51
DRG: 720 | End: 2021-03-30
Payer: MEDICAID

## 2021-03-30 ENCOUNTER — APPOINTMENT (OUTPATIENT)
Dept: CT IMAGING | Age: 51
DRG: 720 | End: 2021-03-30
Payer: MEDICAID

## 2021-03-30 LAB
ANION GAP SERPL CALCULATED.3IONS-SCNC: 13 MMOL/L (ref 3–16)
BASOPHILS ABSOLUTE: 0.1 K/UL (ref 0–0.2)
BASOPHILS RELATIVE PERCENT: 0.3 %
BUN BLDV-MCNC: 19 MG/DL (ref 7–20)
CALCIUM SERPL-MCNC: 8.9 MG/DL (ref 8.3–10.6)
CHLORIDE BLD-SCNC: 100 MMOL/L (ref 99–110)
CO2: 24 MMOL/L (ref 21–32)
CREAT SERPL-MCNC: 1 MG/DL (ref 0.9–1.3)
EKG ATRIAL RATE: 92 BPM
EKG DIAGNOSIS: NORMAL
EKG P AXIS: -3 DEGREES
EKG P-R INTERVAL: 158 MS
EKG Q-T INTERVAL: 384 MS
EKG QRS DURATION: 102 MS
EKG QTC CALCULATION (BAZETT): 474 MS
EKG R AXIS: -11 DEGREES
EKG T AXIS: 53 DEGREES
EKG VENTRICULAR RATE: 92 BPM
EOSINOPHILS ABSOLUTE: 0 K/UL (ref 0–0.6)
EOSINOPHILS RELATIVE PERCENT: 0.1 %
GFR AFRICAN AMERICAN: >60
GFR NON-AFRICAN AMERICAN: >60
GLUCOSE BLD-MCNC: 118 MG/DL (ref 70–99)
HCT VFR BLD CALC: 34.1 % (ref 40.5–52.5)
HEMOGLOBIN: 11.5 G/DL (ref 13.5–17.5)
LYMPHOCYTES ABSOLUTE: 1.2 K/UL (ref 1–5.1)
LYMPHOCYTES RELATIVE PERCENT: 5.4 %
MCH RBC QN AUTO: 27.9 PG (ref 26–34)
MCHC RBC AUTO-ENTMCNC: 33.8 G/DL (ref 31–36)
MCV RBC AUTO: 82.4 FL (ref 80–100)
MONOCYTES ABSOLUTE: 1.2 K/UL (ref 0–1.3)
MONOCYTES RELATIVE PERCENT: 5.1 %
NEUTROPHILS ABSOLUTE: 20.7 K/UL (ref 1.7–7.7)
NEUTROPHILS RELATIVE PERCENT: 89.1 %
PDW BLD-RTO: 13 % (ref 12.4–15.4)
PLATELET # BLD: 308 K/UL (ref 135–450)
PMV BLD AUTO: 9.1 FL (ref 5–10.5)
POTASSIUM REFLEX MAGNESIUM: 3.8 MMOL/L (ref 3.5–5.1)
RBC # BLD: 4.13 M/UL (ref 4.2–5.9)
SODIUM BLD-SCNC: 137 MMOL/L (ref 136–145)
URINE CULTURE, ROUTINE: NORMAL
WBC # BLD: 23.2 K/UL (ref 4–11)

## 2021-03-30 PROCEDURE — 72158 MRI LUMBAR SPINE W/O & W/DYE: CPT

## 2021-03-30 PROCEDURE — 72193 CT PELVIS W/DYE: CPT

## 2021-03-30 PROCEDURE — 85025 COMPLETE CBC W/AUTO DIFF WBC: CPT

## 2021-03-30 PROCEDURE — 6360000002 HC RX W HCPCS: Performed by: INTERNAL MEDICINE

## 2021-03-30 PROCEDURE — 93010 ELECTROCARDIOGRAM REPORT: CPT | Performed by: INTERNAL MEDICINE

## 2021-03-30 PROCEDURE — 2580000003 HC RX 258: Performed by: NURSE PRACTITIONER

## 2021-03-30 PROCEDURE — 6360000004 HC RX CONTRAST MEDICATION: Performed by: NURSE PRACTITIONER

## 2021-03-30 PROCEDURE — 72157 MRI CHEST SPINE W/O & W/DYE: CPT

## 2021-03-30 PROCEDURE — 6360000002 HC RX W HCPCS: Performed by: NURSE PRACTITIONER

## 2021-03-30 PROCEDURE — 99255 IP/OBS CONSLTJ NEW/EST HI 80: CPT | Performed by: INTERNAL MEDICINE

## 2021-03-30 PROCEDURE — 6360000004 HC RX CONTRAST MEDICATION: Performed by: EMERGENCY MEDICINE

## 2021-03-30 PROCEDURE — 2580000003 HC RX 258: Performed by: INTERNAL MEDICINE

## 2021-03-30 PROCEDURE — 6370000000 HC RX 637 (ALT 250 FOR IP): Performed by: INTERNAL MEDICINE

## 2021-03-30 PROCEDURE — 36415 COLL VENOUS BLD VENIPUNCTURE: CPT

## 2021-03-30 PROCEDURE — 1200000000 HC SEMI PRIVATE

## 2021-03-30 PROCEDURE — A9577 INJ MULTIHANCE: HCPCS | Performed by: EMERGENCY MEDICINE

## 2021-03-30 PROCEDURE — 80048 BASIC METABOLIC PNL TOTAL CA: CPT

## 2021-03-30 RX ADMIN — IOPAMIDOL 75 ML: 755 INJECTION, SOLUTION INTRAVENOUS at 08:19

## 2021-03-30 RX ADMIN — SODIUM CHLORIDE: 9 INJECTION, SOLUTION INTRAVENOUS at 00:00

## 2021-03-30 RX ADMIN — ENOXAPARIN SODIUM 40 MG: 40 INJECTION SUBCUTANEOUS at 10:38

## 2021-03-30 RX ADMIN — ESCITALOPRAM OXALATE 20 MG: 10 TABLET ORAL at 10:38

## 2021-03-30 RX ADMIN — CEFTRIAXONE 1000 MG: 1 INJECTION, POWDER, FOR SOLUTION INTRAMUSCULAR; INTRAVENOUS at 22:47

## 2021-03-30 RX ADMIN — CEFTRIAXONE 1000 MG: 1 INJECTION, POWDER, FOR SOLUTION INTRAMUSCULAR; INTRAVENOUS at 10:39

## 2021-03-30 RX ADMIN — SODIUM CHLORIDE: 9 INJECTION, SOLUTION INTRAVENOUS at 18:05

## 2021-03-30 RX ADMIN — GADOBENATE DIMEGLUMINE 20 ML: 529 INJECTION, SOLUTION INTRAVENOUS at 10:06

## 2021-03-30 RX ADMIN — SODIUM CHLORIDE, PRESERVATIVE FREE 10 ML: 5 INJECTION INTRAVENOUS at 10:40

## 2021-03-30 RX ADMIN — ACETAMINOPHEN 650 MG: 325 TABLET ORAL at 05:38

## 2021-03-30 ASSESSMENT — PAIN SCALES - GENERAL
PAINLEVEL_OUTOF10: 0
PAINLEVEL_OUTOF10: 0
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
## 2021-03-30 NOTE — PROGRESS NOTES
4 Eyes Skin Assessment     NAME:  Melani Michaels  YOB: 1970  MEDICAL RECORD NUMBER:  [id number]    The patient is being assess for  Admission    I agree that 2 RN's have performed a thorough Head to Toe Skin Assessment on the patient. ALL assessment sites listed below have been assessed. Areas assessed by both nurses:    Head, Face, Ears, Shoulders, Back, Chest, Arms, Elbows, Hands, Sacrum. Buttock, Coccyx, Ischium and Legs. Feet and Heels        Does the Patient have a Wound?  No noted wound(s)       Prakash Prevention initiated:  No   Wound Care Orders initiated:  No    Pressure Injury (Stage 3,4, Unstageable, DTI, NWPT, and Complex wounds) if present place consult order under [de-identified] No    New and Established Ostomies if present place consult order under : No      Nurse 1 eSignature: Electronically signed by Alhaji Neumann RN on 3/29/21 at 11:40 PM EDT    **SHARE this note so that the co-signing nurse is able to place an eSignature**    Nurse 2 eSignature: Electronically signed by Mk Segal RN on 3/30/21 at 12:38 AM EDT
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
## 2021-03-30 NOTE — PLAN OF CARE
Problem: Falls - Risk of:  Goal: Will remain free from falls  Description: Will remain free from falls  Outcome: Met This Shift  Note: Fall risk assessment completed . Fall precautions in place, bed/ chair alarm on, side rails 2/4 up, call light in reach, educated pt on calling for assistance when needed, room clear of clutter. Pt verbalized understanding. Goal: Absence of physical injury  Description: Absence of physical injury  Outcome: Met This Shift  Note: Pt is free of injury. No injury noted. Fall precautions in place. Call light within reach. Will monitor.

## 2021-03-30 NOTE — OP NOTE
[address]                                OPERATIVE REPORT    PATIENT NAME: Ofe Cruz                   :        1970  MED REC NO:   [id number]                          ROOM:       [id number]  ACCOUNT NO:   [de-identified]                           ADMIT DATE: 2021  PROVIDER:     Summer Geiger MD    DATE OF PROCEDURE:  2021    PREOPERATIVE DIAGNOSIS:  Urinary retention. POSTOPERATIVE DIAGNOSIS:  Urinary retention. OPERATION PERFORMED:  Attempted cystoscopy, suprapubic catheter  insertion under ultrasound guidance. ANESTHESIA:  TIVA. COMPLICATIONS:  None. BLOOD LOSS:  Minimal.    INDICATIONS:  A 43-year-old gentleman presented to the ER today after  several days of minimal urine output and difficulty urinating. He also  reported lower abdominal pain. He had a CAT scan of the abdomen and  pelvis, which showed a distended bladder to the umbilicus and mild  bilateral hydronephrosis. I attempted Duran catheter placement in the  emergency department, which was unsuccessful. He is brought to the  cystoscopy suite for possible cystoscopy versus suprapubic catheter  insertion. OPERATIVE PROCEDURE:  The patient had already been given IV Rocephin. He was taken to the cystoscopy suite. He was moved onto the table. Anesthesia was induced. His position was changed to the lithotomy  position. His genitalia and the lower abdomen were prepped and draped. I attempted to advance the 21-Tamazight cystoscope. The urethra appeared  obliterated and I was unable to identify any passage in the urethra. The bladder was quite distended all the way up to the umbilicus. A site  just two fingerbreadths above the pubic symphysis was marked and a  spinal needle was advanced here with return of clear urine.   A 5-mm  incision was made here and then the ultrasound probe was used to ensure that there was no bowel in the way. It appeared that the bladder was  quite distended with no bowel in the way. The spinal needle was withdrawn and the suprapubic catheter introducing  trocar was advanced through the incision into the bladder. The  obturator was removed with return of clear urine. The 16-English  catheter was placed through the plastic sheath and the balloon was  filled with saline. The plastic sheath was peeled away. The catheter  was placed to gravity drainage, approximately 3 L of urine drained. A  silk stitch was used to secure the tube in place. A dry sterile  dressing was placed. The patient was then awakened and transferred to  recovery. He was noted on exam to have quite a bit of induration in the perineal  area. I did not palpate anything that felt like an abscess. The plan  will be to further image his pelvis and perineal area in the morning,  tomorrow. For now, he is stable. We will continue the catheter to  gravity drainage and continue IV antibiotics.         Arslan Agrawal MD    D: 03/29/2021 23:17:44       T: 03/29/2021 23:22:26     [email or web address]  Job#: [id number]     Doc#: [id number]    CC:

## 2021-03-30 NOTE — PROGRESS NOTES
Patient in bed, he is alert and oriented x4. He was gone for some time this morning for testing. He was tired when he got back to the floor and has been napping. He denies pain, nausea, vomiting. Duran remains to gravity, clear, yellow urine output. Vitals are stable.  Call light in reach, bed alarm set, will monitor

## 2021-03-30 NOTE — PROGRESS NOTES
Pt arrived to floor from ER at 2330 via stretcher. Pt oriented to room, call light, policies and procedures, the menu and ordering. Call light within reach. Bed in lowest position, bed alarm on, and wheels locked. Pt verbalized understanding. No complaints, questions or concerns at this time.   Electronically signed by Oskar Amezquita RN on 3/29/2021 at 11:40 PM

## 2021-03-30 NOTE — CONSULTS
Consulting Physician: Dr Pranay Tim    Reason for Consult: retention    History of Present Illness: Mary Jane Patricia is a 53 y.o. male with increasing trouble urinating for the past few days. No fever. No hematuria. He reports a straddle injury as a child. Has noted slowing of his urinary stream since his 25s but significantly worse recently. UA shows infection. CT with distended bladder and bilateral hydro. Possible urethral diverticulum    Past Medical History:   Past Medical History:   Diagnosis Date    Anxiety     was on paxil in the past    Elevated LFTs     History of tobacco abuse     Hypertension     Prediabetes        Past Surgical History:  History reviewed. No pertinent surgical history.     Social History:  Social History     Socioeconomic History    Marital status:      Spouse name: Not on file    Number of children: Not on file    Years of education: Not on file    Highest education level: Not on file   Occupational History    Not on file   Social Needs    Financial resource strain: Not on file    Food insecurity     Worry: Not on file     Inability: Not on file    Transportation needs     Medical: Not on file     Non-medical: Not on file   Tobacco Use    Smoking status: Former Smoker     Packs/day: 2.00     Years: 10.00     Pack years: 20.00     Quit date: 2007     Years since quittin.8    Smokeless tobacco: Former User   Substance and Sexual Activity    Alcohol use: No     Alcohol/week: 0.0 standard drinks    Drug use: No    Sexual activity: Never   Lifestyle    Physical activity     Days per week: Not on file     Minutes per session: Not on file    Stress: Not on file   Relationships    Social connections     Talks on phone: Not on file     Gets together: Not on file     Attends [employer] service: Not on file     Active member of club or organization: Not on file     Attends meetings of clubs or organizations: Not on file     Relationship status: Not on file    Intimate partner violence     Fear of current or ex partner: Not on file     Emotionally abused: Not on file     Physically abused: Not on file     Forced sexual activity: Not on file   Other Topics Concern    Not on file   Social History Narrative    Not on file       Family History:  Family History   Problem Relation Age of Onset    Stroke Father     Asthma Sister     High Blood Pressure Brother        Meds:   No current facility-administered medications for this encounter.      Review of Systems:  10 Systems were reviewed and negative except as in HPI    Vitals:  BP (!) 184/86   Pulse 85   Temp 97.5 °F (36.4 °C) (Temporal)   Resp 18   Ht 6' (1.829 m)   Wt 264 lb 15.9 oz (120.2 kg)   SpO2 97%   BMI 35.94 kg/m²   No intake or output data in the 24 hours ending 03/29/21 2136    Physical Exam:  General Appearance: Alert and oriented, cooperative, moderate distress, appears stated age  Head: Normocephalic, without obvious abnormality, atraumatic  Back: no CVA tenderness  Lungs: respirations unlabored, no wheezing  Heart: Regular rate and rhythm, no lower extremity edema noted  Abdomen: Soft, non-tender, non-distended, no masses  Skin: Skin color, texture, turgor normal, no rashes or lesions  Neurologic: no gross deficits  Male :    bladder palpable to umbilicus, tender   No CVA tenderness   Penis appears normal and circumcised   Urethral meatus is normal in size and location   Scrotum appears normal and both testicles appear normal in size and location   Induration of perineum noted     PENNIE Not indicated    Labs:  CBC   Lab Results   Component Value Date    WBC 25.9 03/29/2021    RBC 4.43 03/29/2021    HGB 12.1 03/29/2021    HCT 36.3 03/29/2021    MCV 82.0 03/29/2021    MCH 27.3 03/29/2021    MCHC 33.2 03/29/2021    RDW 12.9 03/29/2021     03/29/2021    MPV 9.4 03/29/2021     BMP   Lab Results   Component Value Date     03/29/2021    K 3.7 03/29/2021    CL 98 03/29/2021    CO2 22 03/29/2021    BUN 21 03/29/2021    CREATININE 1.0 03/29/2021    GLUCOSE 137 03/29/2021    CALCIUM 9.5 03/29/2021       Urinalysis:   Lab Results   Component Value Date    COLORU Yellow 03/29/2021    GLUCOSEU Negative 03/29/2021    BLOODU MODERATE 03/29/2021    NITRU Negative 03/29/2021    LEUKOCYTESUR MODERATE 03/29/2021       Imaging:  Pertinent images and radiologist's report were reviewed independently  Bladder is significantly distended to above the level of the iliac crest.       Mild hydronephrosis bilaterally which is likely related to bladder distension.       Inflammatory changes involving the penis which are incompletely evaluated. Urethra appears prominent in the prostate and penis with a few cystic lesions   in the soft tissues of the penis which are incompletely evaluated. [place] may   communicate with urethra and represent diverticula.  Recommend urologic   consultation.       Mildly enlarged inguinal lymph nodes bilaterally which may be reactive. Procedure:  Prepped and draped genitalia. 10 ml lidocaine jelly instilled. Attempted 16 fr coude catheter without success. Placed 0.035 zipwire. Attempted 16 [place] over wire without success. Holder Jose M catheter placed over wire with return of clear urine. Attempted 14 fr [place] over [employer] and over wire without success. Patient having significant discomfort.           Impression/Plan: urinary retention due to stricture    - unable to place wright over wire in ED  - to OR for cysto, possible urethral dilation, possible SP tube  - risks including but not limited to bleeding, infection, recurrent stricture, bowel or bladder injury discussed      Camille Galloway MD 1/14/96967:22 PM

## 2021-03-30 NOTE — ED NOTES
Report called to EBONY Posey ton 3W. Denies further questions.      Hilbert Saint, RN  03/29/21 [id number]

## 2021-03-30 NOTE — ANESTHESIA PRE PROCEDURE
Department of Anesthesiology  Preprocedure Note       Name:  Quynh Rowe   Age:  53 y.o.  :  1970                                          MRN:  [id number]         Date:  3/29/2021      Surgeon: Prem Bain):  Niyah Bond MD    Procedure: Procedure(s):  CYSTOSCOPY, POSSIBLE URETRAL DILATATION, POSSIBLE SUPRAPUBIC TUBE INSERTION    Medications prior to admission:   Prior to Admission medications    Medication Sig Start Date End Date Taking? Authorizing Provider   escitalopram (LEXAPRO) 20 MG tablet Take 1 tablet by mouth daily 20  Yes Fer Garcia MD       Current medications:    No current facility-administered medications for this encounter. Current Outpatient Medications   Medication Sig Dispense Refill    escitalopram (LEXAPRO) 20 MG tablet Take 1 tablet by mouth daily 90 tablet 1       Allergies:  No Known Allergies    Problem List:    Patient Active Problem List   Diagnosis Code    Anxiety F41.9    History of tobacco abuse Z87.891    Discitis of lumbar region M46.46    Osteomyelitis of lumbar spine (HCC) M46.26    Weight loss, non-intentional R63.4    Prediabetes R73.03    Obesity due to excess calories with serious comorbidity E66.09    Obesity due to excess calories E66.09    Acute cystitis with hematuria N30.01    Urinary retention R33.9    Bilateral hydronephrosis N13.30    Acute midline low back pain M54.5    Morbid obesity due to excess calories (HCC) E66.01    Sepsis (HCC) A41.9    Tachycardia P19.1    Neutrophilic leukocytosis V66.2       Past Medical History:        Diagnosis Date    Anxiety     was on paxil in the past    Elevated LFTs     History of tobacco abuse     Hypertension     Prediabetes        Past Surgical History:  History reviewed. No pertinent surgical history.     Social History:    Social History     Tobacco Use    Smoking status: Former Smoker     Packs/day: 2.00     Years: 10.00     Pack years: 20.00     Quit date: 2007     Years since quittin.8    Smokeless tobacco: Former User   Substance Use Topics    Alcohol use: No     Alcohol/week: 0.0 standard drinks                                Counseling given: Not Answered      Vital Signs (Current):   Vitals:    21 1612 21 2145   BP: (!) 164/94 (!) 184/86 (!) 186/103   Pulse: 96 85 78   Resp: 18 18 18   Temp: 97.5 °F (36.4 °C)     TempSrc: Temporal     SpO2: 95% 97% 99%   Weight: 264 lb 15.9 oz (120.2 kg)     Height: 6' (1.829 m)                                                BP Readings from Last 3 Encounters:   21 (!) 186/103   21 136/68   20 125/82       NPO Status:   >8hrs                                                                                BMI:   Wt Readings from Last 3 Encounters:   21 264 lb 15.9 oz (120.2 kg)   21 265 lb (120.2 kg)   20 276 lb (125.2 kg)     Body mass index is 35.94 kg/m². CBC:   Lab Results   Component Value Date    WBC 25.9 2021    RBC 4.43 2021    HGB 12.1 2021    HCT 36.3 2021    MCV 82.0 2021    RDW 12.9 2021     2021       CMP:   Lab Results   Component Value Date     2021    K 3.7 2021    CL 98 2021    CO2 22 2021    BUN 21 2021    CREATININE 1.0 2021    GFRAA >60 2021    AGRATIO 1.4 2020    LABGLOM >60 2021    GLUCOSE 137 2021    PROT 8.5 2021    CALCIUM 9.5 2021    BILITOT 0.8 2021    ALKPHOS 81 2021    AST 32 2021    ALT 19 2021       POC Tests: No results for input(s): POCGLU, POCNA, POCK, POCCL, POCBUN, POCHEMO, POCHCT in the last 72 hours.     Coags:   Lab Results   Component Value Date    PROTIME 14.2 11/10/2017    INR 1.26 11/10/2017    APTT 33.1 05/10/2014       HCG (If Applicable): No results found for: PREGTESTUR, PREGSERUM, HCG, HCGQUANT     ABGs: No results found for: PHART, PO2ART, IJX6XQZ, XOO9ROD, BEART, T6IZIIFC     Type & Screen (If Applicable):  No results found for: LABABO, LABRH    Drug/Infectious Status (If Applicable):  No results found for: HIV, HEPCAB    COVID-19 Screening (If Applicable):   Lab Results   Component Value Date    COVID19 Not Detected 03/29/2021           Anesthesia Evaluation  Patient summary reviewed no history of anesthetic complications:   Airway: Mallampati: III  TM distance: >3 FB   Neck ROM: full  Mouth opening: > = 3 FB Dental:      Comment: Missing teeth    Pulmonary: breath sounds clear to auscultation      (-) COPD, asthma, shortness of breath, recent URI and sleep apnea                           Cardiovascular:    (+) hypertension:,     (-) valvular problems/murmurs, past MI, CAD, CABG/stent, dysrhythmias,  angina and  CHF    ECG reviewed  Rhythm: regular  Rate: normal  Echocardiogram reviewed               ROS comment: Summary   Normal left ventricle size, wall thickness and systolic function with an   estimated ejection fraction of 55%. No regional wall motion abnormalities   are seen. Diastolic filling parameters suggests normal diastolic function . No evidence of significant valve regurgitation present. No evidence of   vegetations or masses seen on valves. Signature      ------------------------------------------------------------------   Electronically signed by Dejuan Helm MD (Interpreting   physician) on 11/07/2017 at 02:32 PM   -----------------------------------------------------------------     Neuro/Psych:   (+) depression/anxiety    (-) seizures, neuromuscular disease, TIA, CVA and headaches           GI/Hepatic/Renal:        (-) GERD, PUD, hepatitis, liver disease and no renal disease      ROS comment: Unable to urinate .    Endo/Other:        (-) diabetes mellitus, hypothyroidism, hyperthyroidism, blood dyscrasia               Abdominal:   (+) obese,         Vascular:                                        Anesthesia Plan      MAC     ASA 3 -
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
## 2021-03-30 NOTE — PROGRESS NOTES
Pt Name: Rod Izquierdo  Medical Record Number: [id number]  Date of Birth 1970   Today's Date: 3/30/2021      Subjective:  Feeling much better today. SP tube intact with clear/yellow output. Denies pain. Comfortable at this time. ROS: Constitutional: No fever    Vitals:  Vitals:    03/29/21 2327 03/30/21 0415 03/30/21 0417 03/30/21 0749   BP: (!) 151/89  (!) 145/78 127/80   Pulse: 90  91 84   Resp: 16  16 16   Temp: 99.6 °F (37.6 °C)  100.1 °F (37.8 °C) 99.1 °F (37.3 °C)   TempSrc: Axillary  Oral Oral   SpO2: 95%  94% 95%   Weight:  264 lb 5.3 oz (119.9 kg)     Height:  6' (1.829 m)       I/O last 3 completed shifts: In: 1783.8 [P.O.:1000; I.V.:783.8]  Out: 750 [Urine:750]    Exam:  General: Awake, oriented, no acute distress  Respiratory: Nonlabored breathing  Abdomen: Soft, non-tender, non-distended, no masses  : SP tube intact, scrotum/testicles normal, induration to perineum - no pain.   Skin: Skin color, texture, turgor normal, no rashes or lesions  Neurologic: no gross deficits    CURRENT MEDICATIONS   Scheduled Meds:   cefTRIAXone (ROCEPHIN) IV  1,000 mg Intravenous Q24H    escitalopram  20 mg Oral Daily    sodium chloride flush  10 mL Intravenous 2 times per day    enoxaparin  40 mg Subcutaneous Daily     Continuous Infusions:   sodium chloride      sodium chloride 75 mL/hr at 03/30/21 0000     PRN Meds:.sodium chloride flush, sodium chloride, ondansetron, polyethylene glycol, acetaminophen **OR** acetaminophen    LABS     Recent Labs     03/29/21  1650 03/30/21  0440   WBC 25.9* 23.2*   HGB 12.1* 11.5*   HCT 36.3* 34.1*    308    137   K 3.7 3.8   CL 98* 100   CO2 22 24   BUN 21* 19   CREATININE 1.0 1.0   CALCIUM 9.5 8.9   AST 32  --    ALT 19  --    BILITOT 0.8  --    BILIDIR <0.2  --      CT pelvis with contrast 3/30/21  Impression   4.2 x 4.0 x 2.9 cm abscess versus diverticulum along the ventral base of the   penis, with communication to the urethra     ASSESSMENT

## 2021-03-30 NOTE — BRIEF OP NOTE
Brief Postoperative Note      Patient: Chino Oneill  YOB: 1970  MRN: [id number]    Date of Procedure: 3/29/2021    Pre-Op Diagnosis: urinary retention    Post-Op Diagnosis: Same       Procedure(s):  CYSTOSCOPY,  SUPRAPUBIC TUBE INSERTION    Surgeon(s):  Kaushal Johnson MD    Assistant:  * No surgical staff found *    Anesthesia: Monitor Anesthesia Care    Estimated Blood Loss (mL): Minimal    Complications: None    Specimens:   * No specimens in log *    Implants:  * No implants in log *      Drains:   Suprapubic Catheter Double-lumen 16 fr (Active)   Dressing Type Dry dressing; Other (Comment) 03/29/21 6009       Findings: unable to visualize urethra cystoscopically  SP tube placed with return of about 3 liters of cloudy urine    Electronically signed by Kaushal Johnson MD on 3/29/2021 at 11:01 PM

## 2021-03-30 NOTE — ANESTHESIA POSTPROCEDURE EVALUATION
Department of Anesthesiology  Postprocedure Note    Patient: Honora Angelucci  MRN: [id number]  YOB: 1970  Date of evaluation: 3/29/2021  Time:  2311    Procedure Summary     Date: 03/29/21 Room / Location: [address] [place] [address]    Anesthesia Start: 2226 Anesthesia Stop: 2301    Procedure: CYSTOSCOPY,  SUPRAPUBIC TUBE INSERTION (N/A ) Diagnosis: (URETHRAL OBSTRUCTION)    Surgeons: Marylee Kaiser, MD Responsible Provider: Estephanie Leon MD    Anesthesia Type: MAC ASA Status: 3 - Emergent          Anesthesia Type: MAC    Moses Phase I: Moses Score: 10    Moses Phase II:      Last vitals: Reviewed and per EMR flowsheets.        Anesthesia Post Evaluation    Patient location during evaluation: PACU  Patient participation: complete - patient participated  Level of consciousness: awake and alert  Airway patency: patent  Nausea & Vomiting: no nausea and no vomiting  Complications: no  Cardiovascular status: hemodynamically stable  Respiratory status: acceptable  Hydration status: stable

## 2021-03-31 LAB
ANION GAP SERPL CALCULATED.3IONS-SCNC: 10 MMOL/L (ref 3–16)
BASOPHILS ABSOLUTE: 0 K/UL (ref 0–0.2)
BASOPHILS RELATIVE PERCENT: 0.2 %
BUN BLDV-MCNC: 14 MG/DL (ref 7–20)
CALCIUM SERPL-MCNC: 8.8 MG/DL (ref 8.3–10.6)
CHLORIDE BLD-SCNC: 103 MMOL/L (ref 99–110)
CO2: 24 MMOL/L (ref 21–32)
CREAT SERPL-MCNC: 0.8 MG/DL (ref 0.9–1.3)
EOSINOPHILS ABSOLUTE: 0.2 K/UL (ref 0–0.6)
EOSINOPHILS RELATIVE PERCENT: 1.3 %
GFR AFRICAN AMERICAN: >60
GFR NON-AFRICAN AMERICAN: >60
GLUCOSE BLD-MCNC: 101 MG/DL (ref 70–99)
HCT VFR BLD CALC: 31.7 % (ref 40.5–52.5)
HEMOGLOBIN: 10.7 G/DL (ref 13.5–17.5)
LYMPHOCYTES ABSOLUTE: 1.4 K/UL (ref 1–5.1)
LYMPHOCYTES RELATIVE PERCENT: 8.9 %
MCH RBC QN AUTO: 27.6 PG (ref 26–34)
MCHC RBC AUTO-ENTMCNC: 33.6 G/DL (ref 31–36)
MCV RBC AUTO: 82 FL (ref 80–100)
MONOCYTES ABSOLUTE: 0.8 K/UL (ref 0–1.3)
MONOCYTES RELATIVE PERCENT: 4.8 %
NEUTROPHILS ABSOLUTE: 13.4 K/UL (ref 1.7–7.7)
NEUTROPHILS RELATIVE PERCENT: 84.8 %
PDW BLD-RTO: 13.6 % (ref 12.4–15.4)
PLATELET # BLD: 315 K/UL (ref 135–450)
PMV BLD AUTO: 8.6 FL (ref 5–10.5)
POTASSIUM REFLEX MAGNESIUM: 3.7 MMOL/L (ref 3.5–5.1)
RBC # BLD: 3.87 M/UL (ref 4.2–5.9)
SODIUM BLD-SCNC: 137 MMOL/L (ref 136–145)
WBC # BLD: 15.8 K/UL (ref 4–11)

## 2021-03-31 PROCEDURE — 80048 BASIC METABOLIC PNL TOTAL CA: CPT

## 2021-03-31 PROCEDURE — 6370000000 HC RX 637 (ALT 250 FOR IP): Performed by: INTERNAL MEDICINE

## 2021-03-31 PROCEDURE — 2580000003 HC RX 258: Performed by: INTERNAL MEDICINE

## 2021-03-31 PROCEDURE — 85025 COMPLETE CBC W/AUTO DIFF WBC: CPT

## 2021-03-31 PROCEDURE — 6360000002 HC RX W HCPCS: Performed by: INTERNAL MEDICINE

## 2021-03-31 PROCEDURE — 36415 COLL VENOUS BLD VENIPUNCTURE: CPT

## 2021-03-31 PROCEDURE — 1200000000 HC SEMI PRIVATE

## 2021-03-31 PROCEDURE — 99233 SBSQ HOSP IP/OBS HIGH 50: CPT | Performed by: INTERNAL MEDICINE

## 2021-03-31 RX ORDER — SENNA AND DOCUSATE SODIUM 50; 8.6 MG/1; MG/1
2 TABLET, FILM COATED ORAL DAILY PRN
Status: DISCONTINUED | OUTPATIENT
Start: 2021-03-31 | End: 2021-04-01 | Stop reason: HOSPADM

## 2021-03-31 RX ADMIN — POLYETHYLENE GLYCOL 3350 17 G: 17 POWDER, FOR SOLUTION ORAL at 14:59

## 2021-03-31 RX ADMIN — CEFTRIAXONE 1000 MG: 1 INJECTION, POWDER, FOR SOLUTION INTRAMUSCULAR; INTRAVENOUS at 21:18

## 2021-03-31 RX ADMIN — ONDANSETRON 4 MG: 2 INJECTION INTRAMUSCULAR; INTRAVENOUS at 10:16

## 2021-03-31 RX ADMIN — CEFTRIAXONE 1000 MG: 1 INJECTION, POWDER, FOR SOLUTION INTRAMUSCULAR; INTRAVENOUS at 10:16

## 2021-03-31 RX ADMIN — SODIUM CHLORIDE, PRESERVATIVE FREE 10 ML: 5 INJECTION INTRAVENOUS at 21:18

## 2021-03-31 RX ADMIN — ESCITALOPRAM OXALATE 20 MG: 10 TABLET ORAL at 10:16

## 2021-03-31 RX ADMIN — ACETAMINOPHEN 650 MG: 325 TABLET ORAL at 17:33

## 2021-03-31 RX ADMIN — ENOXAPARIN SODIUM 40 MG: 40 INJECTION SUBCUTANEOUS at 10:16

## 2021-03-31 ASSESSMENT — PAIN DESCRIPTION - PAIN TYPE: TYPE: ACUTE PAIN

## 2021-03-31 ASSESSMENT — PAIN SCALES - GENERAL: PAINLEVEL_OUTOF10: 0

## 2021-03-31 ASSESSMENT — PAIN DESCRIPTION - PROGRESSION: CLINICAL_PROGRESSION: GRADUALLY WORSENING

## 2021-03-31 ASSESSMENT — PAIN DESCRIPTION - LOCATION
LOCATION: SCROTUM
LOCATION: SCROTUM

## 2021-03-31 ASSESSMENT — PAIN - FUNCTIONAL ASSESSMENT: PAIN_FUNCTIONAL_ASSESSMENT: PREVENTS OR INTERFERES SOME ACTIVE ACTIVITIES AND ADLS

## 2021-03-31 ASSESSMENT — PAIN DESCRIPTION - FREQUENCY: FREQUENCY: INTERMITTENT

## 2021-03-31 NOTE — PLAN OF CARE
Problem: Nutrition  Goal: Optimal nutrition therapy  Outcome: Ongoing     Nutrition Problem #1: Inadequate oral intake  Intervention: Food and/or Nutrient Delivery: Continue Current Diet  Nutritional Goals:  Tolerate diet and consume greater than 50% of meals and supplements this admission

## 2021-03-31 NOTE — PROGRESS NOTES
Pt resting in bed. A&Ox4. No complaints overnight. IVF running at 75ml/hr. Fall precautions in place, call light and bedside table within reach.

## 2021-03-31 NOTE — PLAN OF CARE
Problem: Falls - Risk of:  Goal: Will remain free from falls  Description: Will remain free from falls  Outcome: Met This Shift  Note: No falls noted this shift. Patient ambulates without difficulty. Bed kept in low position. Safe environment maintained. Bedside table & call light in reach. Uses call light appropriately when needing assistance. Goal: Absence of physical injury  Description: Absence of physical injury  Outcome: Met This Shift  Note: No physical injury noted this shift. Patient has been assessed for fall risk, fall precautions are in place, environment has been cleared of obstacles and reassessed during rounding, bed is in lowest position with the wheels locked, call light is within reach.  ID band has been verified, appropriate transfer methods have been utilized and patient has been educated on safety     Problem: Nutrition  Goal: Optimal nutrition therapy  3/31/2021 1618 by Shamir Priest RN  Outcome: Met This Shift  Note: Patient has been eating 100% of his meals, and he is staying hydrated   3/31/2021 1124 by Julita Shannon RD, LD  Outcome: Ongoing

## 2021-03-31 NOTE — PROGRESS NOTES
Comprehensive Nutrition Assessment    Type and Reason for Visit:  Initial, Positive Nutrition Screen    Nutrition Recommendations/Plan:   General diet to promote intake   Will continue to monitor need for ONS  Will monitor nutritional adequacy, nutrition-related labs, weights, BMs, and clinical progress     Nutrition Assessment:  + Screen for MST 2. Pt admitted with urinary retention. Pt sleeping soundly during visit, did not wake up. Pt currently on General diet, eating most of meals at this time. Reviewed EMR weights, limited available but they do seem fairly stable. Slight downtrend but not significant. Will continue to monitor intakes and need for ONS. Malnutrition Assessment:  Malnutrition Status:  Insufficient data    Context:  Chronic Illness     Findings of the 6 clinical characteristics of malnutrition:  Energy Intake:  Unable to assess  Weight Loss:  1 - Mild weight loss (specify amount and time period)     Body Fat Loss:  Unable to assess     Muscle Mass Loss:  Unable to assess    Fluid Accumulation:  No significant fluid accumulation     Strength:  Not Performed    Estimated Daily Nutrient Needs:  Energy (kcal):  3237-6944 kcal (15-18 kcal/kg ABW); Weight Used for Energy Requirements:  Current     Protein (g):   gm (1.2-1.5 gm/kg IBW); Weight Used for Protein Requirements:  Ideal        Fluid (ml/day):   ; Method Used for Fluid Requirements:  1 ml/kcal      Nutrition Related Findings:  Reviewed labs      Wounds:  Surgical Incision       Current Nutrition Therapies:    DIET GENERAL; Anthropometric Measures:  · Height: 6' (182.9 cm)  · Current Body Weight: 264 lb (119.7 kg)   · Admission Body Weight: 264 lb (119.7 kg)    · Usual Body Weight: 276 lb (125.2 kg)     · Ideal Body Weight: 178 lbs; % Ideal Body Weight 148. 3 %   · BMI: 35.8  · Adjusted Body Weight:  ; No Adjustment   · BMI Categories: Obese Class 2 (BMI 35.0 -39.9)       Nutrition Diagnosis:   · Inadequate oral intake related to inadequate protein-energy intake as evidenced by poor intake prior to admission(per pt report)      Nutrition Interventions:   Food and/or Nutrient Delivery:  Continue Current Diet  Nutrition Education/Counseling:  No recommendation at this time   Coordination of Nutrition Care:  Continue to monitor while inpatient    Goals:   Tolerate diet and consume greater than 50% of meals and supplements this admission       Nutrition Monitoring and Evaluation:   Behavioral-Environmental Outcomes:  None Identified   Food/Nutrient Intake Outcomes:  Food and Nutrient Intake  Physical Signs/Symptoms Outcomes:  Biochemical Data, Nutrition Focused Physical Findings, Skin, Weight     Discharge Planning:    No discharge needs at this time     Electronically signed by Dannielle Foster RD, LD on 3/31/21 at 11:22 AM EDT    Contact: [phone]

## 2021-03-31 NOTE — PROGRESS NOTES
Patient in bed, he is alert and oriented x4. His vitals are stable. He was nauseated this morning and given Zofran, nausea resolved. Duran remains in place and is to gravity. He denies pain. Urostomy dressing is clean, dry, intact. Patient denies needs at this time.  Call light in reach, will monitor
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
## 2021-03-31 NOTE — PLAN OF CARE
Problem: Falls - Risk of:  Goal: Will remain free from falls  Description: Will remain free from falls  3/31/2021 0151 by Nelson Little RN  Outcome: Ongoing  Note: Fall risk assessment completed. Fall precautions in place. Call light within reach. Pt educated on calling for assistance before getting up. Walkway free of clutter. Will continue to monitor. 3/30/2021 1626 by Palmira Rodriguez RN  Outcome: Met This Shift  Note: No falls noted this shift. Patient ambulates with x1 staff assistance without difficulty. Bed kept in low position. Safe environment maintained. Bedside table & call light in reach. Uses call light appropriately when needing assistance. Goal: Absence of physical injury  Description: Absence of physical injury  3/31/2021 0151 by Nelson Little RN  Outcome: Ongoing  Note: Pt is free of injury. No injury noted. Fall precautions in place. Call light within reach. Will monitor. 3/30/2021 1626 by Palmira Rodriguez RN  Outcome: Met This Shift  Note: Patient has been assessed for fall risk, fall precautions are in place, environment has been cleared of obstacles and reassessed during rounding, bed is in lowest position with the wheels locked, call light is within reach.  ID band has been verified, appropriate transfer methods have been utilized and patient has been educated on safety

## 2021-03-31 NOTE — CARE COORDINATION
3/31 met with patient at bedside to discuss dc needs. Patient plans to return home at dc   PCP- Anthony Ruiz  Patient has no insurance - states he does not have issues obtaining meds - uses the RX card. Will have transport home at dc. Denies dc needs.   Electronically signed by Jamee Harris on 3/31/2021 at 1:07 PM  #[phone]
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
## 2021-04-01 VITALS
TEMPERATURE: 97.9 F | WEIGHT: 263.23 LBS | RESPIRATION RATE: 14 BRPM | DIASTOLIC BLOOD PRESSURE: 75 MMHG | SYSTOLIC BLOOD PRESSURE: 125 MMHG | HEART RATE: 85 BPM | BODY MASS INDEX: 35.65 KG/M2 | HEIGHT: 72 IN | OXYGEN SATURATION: 95 %

## 2021-04-01 LAB
ANION GAP SERPL CALCULATED.3IONS-SCNC: 10 MMOL/L (ref 3–16)
BASOPHILS ABSOLUTE: 0.1 K/UL (ref 0–0.2)
BASOPHILS RELATIVE PERCENT: 0.8 %
BUN BLDV-MCNC: 13 MG/DL (ref 7–20)
CALCIUM SERPL-MCNC: 8.5 MG/DL (ref 8.3–10.6)
CHLORIDE BLD-SCNC: 102 MMOL/L (ref 99–110)
CO2: 25 MMOL/L (ref 21–32)
CREAT SERPL-MCNC: 0.8 MG/DL (ref 0.9–1.3)
EOSINOPHILS ABSOLUTE: 0.3 K/UL (ref 0–0.6)
EOSINOPHILS RELATIVE PERCENT: 1.9 %
GFR AFRICAN AMERICAN: >60
GFR NON-AFRICAN AMERICAN: >60
GLUCOSE BLD-MCNC: 119 MG/DL (ref 70–99)
HCT VFR BLD CALC: 33 % (ref 40.5–52.5)
HEMOGLOBIN: 11.1 G/DL (ref 13.5–17.5)
LYMPHOCYTES ABSOLUTE: 1.5 K/UL (ref 1–5.1)
LYMPHOCYTES RELATIVE PERCENT: 10.1 %
MCH RBC QN AUTO: 27.5 PG (ref 26–34)
MCHC RBC AUTO-ENTMCNC: 33.6 G/DL (ref 31–36)
MCV RBC AUTO: 81.7 FL (ref 80–100)
MONOCYTES ABSOLUTE: 0.7 K/UL (ref 0–1.3)
MONOCYTES RELATIVE PERCENT: 4.8 %
NEUTROPHILS ABSOLUTE: 12 K/UL (ref 1.7–7.7)
NEUTROPHILS RELATIVE PERCENT: 82.4 %
PDW BLD-RTO: 13.3 % (ref 12.4–15.4)
PLATELET # BLD: 343 K/UL (ref 135–450)
PMV BLD AUTO: 8.6 FL (ref 5–10.5)
POTASSIUM REFLEX MAGNESIUM: 3.7 MMOL/L (ref 3.5–5.1)
RBC # BLD: 4.04 M/UL (ref 4.2–5.9)
SODIUM BLD-SCNC: 137 MMOL/L (ref 136–145)
WBC # BLD: 14.6 K/UL (ref 4–11)

## 2021-04-01 PROCEDURE — 2580000003 HC RX 258: Performed by: INTERNAL MEDICINE

## 2021-04-01 PROCEDURE — 99232 SBSQ HOSP IP/OBS MODERATE 35: CPT | Performed by: INTERNAL MEDICINE

## 2021-04-01 PROCEDURE — 85025 COMPLETE CBC W/AUTO DIFF WBC: CPT

## 2021-04-01 PROCEDURE — 6360000002 HC RX W HCPCS: Performed by: INTERNAL MEDICINE

## 2021-04-01 PROCEDURE — 36415 COLL VENOUS BLD VENIPUNCTURE: CPT

## 2021-04-01 PROCEDURE — 80048 BASIC METABOLIC PNL TOTAL CA: CPT

## 2021-04-01 PROCEDURE — 6370000000 HC RX 637 (ALT 250 FOR IP): Performed by: INTERNAL MEDICINE

## 2021-04-01 RX ORDER — LEVOFLOXACIN 500 MG/1
500 TABLET, FILM COATED ORAL DAILY
Qty: 10 TABLET | Refills: 0 | Status: SHIPPED | OUTPATIENT
Start: 2021-04-01 | End: 2021-04-11

## 2021-04-01 RX ADMIN — ESCITALOPRAM OXALATE 20 MG: 10 TABLET ORAL at 10:23

## 2021-04-01 RX ADMIN — SODIUM CHLORIDE, PRESERVATIVE FREE 10 ML: 5 INJECTION INTRAVENOUS at 10:24

## 2021-04-01 RX ADMIN — ENOXAPARIN SODIUM 40 MG: 40 INJECTION SUBCUTANEOUS at 10:23

## 2021-04-01 RX ADMIN — CEFTRIAXONE 1000 MG: 1 INJECTION, POWDER, FOR SOLUTION INTRAMUSCULAR; INTRAVENOUS at 10:22

## 2021-04-01 ASSESSMENT — PAIN - FUNCTIONAL ASSESSMENT: PAIN_FUNCTIONAL_ASSESSMENT: PREVENTS OR INTERFERES SOME ACTIVE ACTIVITIES AND ADLS

## 2021-04-01 ASSESSMENT — PAIN DESCRIPTION - ONSET: ONSET: ON-GOING

## 2021-04-01 ASSESSMENT — PAIN DESCRIPTION - FREQUENCY: FREQUENCY: CONTINUOUS

## 2021-04-01 ASSESSMENT — PAIN DESCRIPTION - ORIENTATION: ORIENTATION: LOWER

## 2021-04-01 ASSESSMENT — PAIN DESCRIPTION - LOCATION: LOCATION: SCROTUM

## 2021-04-01 ASSESSMENT — PAIN DESCRIPTION - DESCRIPTORS: DESCRIPTORS: DISCOMFORT

## 2021-04-01 ASSESSMENT — PAIN DESCRIPTION - PROGRESSION: CLINICAL_PROGRESSION: GRADUALLY IMPROVING
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
## 2021-04-01 NOTE — PROGRESS NOTES
Called urology to make aware of scrotal swelling and redness, awaiting call back.  Electronically signed by Claudia Barron RN on 4/1/2021 at 11:16 AM

## 2021-04-01 NOTE — PLAN OF CARE
Problem: Falls - Risk of:  Goal: Will remain free from falls  Description: Will remain free from falls  4/1/2021 0026 by Wil Wall RN  Outcome: Ongoing  Note: Fall risk assessment completed. Fall precautions in place. Call light within reach. Pt educated on calling for assistance before getting up. Walkway free of clutter. Will continue to monitor. 3/31/2021 1618 by Sheryl Estrada RN  Outcome: Met This Shift  Note: No falls noted this shift. Patient ambulates without difficulty. Bed kept in low position. Safe environment maintained. Bedside table & call light in reach. Uses call light appropriately when needing assistance. Goal: Absence of physical injury  Description: Absence of physical injury  4/1/2021 0026 by Wil Wall RN  Outcome: Ongoing  Note: Pt is free of injury. No injury noted. Fall precautions in place. Call light within reach. Will monitor. 3/31/2021 1618 by Sheryl Estrada RN  Outcome: Met This Shift  Note: No physical injury noted this shift. Patient has been assessed for fall risk, fall precautions are in place, environment has been cleared of obstacles and reassessed during rounding, bed is in lowest position with the wheels locked, call light is within reach.  ID band has been verified, appropriate transfer methods have been utilized and patient has been educated on safety     Problem: Nutrition  Goal: Optimal nutrition therapy  4/1/2021 0026 by Wil Wall RN  Outcome: Ongoing  3/31/2021 1618 by Sheryl Estrada RN  Outcome: Met This Shift  Note: Patient has been eating 100% of his meals, and he is staying hydrated   3/31/2021 1124 by Zuleyka Ruby RD, LD  Outcome: Ongoing

## 2021-04-01 NOTE — PROGRESS NOTES
Data- discharge order received, pt verbalized agreement to discharge, disposition to previous residence, no needs for HHC/DME. Action- discharge instructions prepared and given to patient, pt verbalized understanding. Medication information packet given r/t NEW and/or CHANGED prescriptions emphasizing name/purpose/side effects, pt verbalized understanding. Discharge instruction summary: Diet- general, Activity- as tolerated , Primary Care Physician as follows: Danni Rice -[phone] f/u appointment with urology in a week, immunizations reviewed and pt instructed to follow up with PCP, prescription medications filled pt has printed script to drop off. Response- Pt belongings gathered, IV removed. Disposition is home (no HHC/DME needs), transported with wheelchair, taken to lobby via w/c w/ aide, no complications.    Electronically signed by Alena Reddy RN on 4/1/2021 at 5:03 PM

## 2021-04-01 NOTE — PROGRESS NOTES
Patient is resting in bed. A/Ox4. Patient denies any pain or discomfort. Morning meds given. All needs are met and safety precautions in place. Will continue to monitor and assess.

## 2021-04-01 NOTE — PROGRESS NOTES
Pt resting in bed. A&Ox4. No complaints overnight. Suprapubic catheter in place, pt tolerating well. Fall precautions in place, call light and bedside table within reach.

## 2021-04-01 NOTE — PLAN OF CARE
Problem: Falls - Risk of:  Goal: Will remain free from falls  Description: Will remain free from falls  4/1/2021 0803 by Coletta Ahumada, RN  Outcome: Ongoing  Note: Fall risk assessment completed. Fall precautions in place. Call light within reach. Pt educated on calling for assistance before getting up. Walkway free of clutter. Will continue to monitor. 4/1/2021 0026 by Kingston Guevara RN  Outcome: Ongoing  Note: Fall risk assessment completed. Fall precautions in place. Call light within reach. Pt educated on calling for assistance before getting up. Walkway free of clutter. Will continue to monitor. Goal: Absence of physical injury  Description: Absence of physical injury  4/1/2021 0803 by Coletta Ahumada, RN  Outcome: Ongoing  Note: Pt is free of injury. No injury noted. Fall precautions in place. Call light within reach. Will monitor. 4/1/2021 0026 by Kingston Guevara RN  Outcome: Ongoing  Note: Pt is free of injury. No injury noted. Fall precautions in place. Call light within reach. Will monitor. Problem: Nutrition  Goal: Optimal nutrition therapy  4/1/2021 0803 by Coletta Ahumada, RN  Outcome: Ongoing  Note: Patient's nutrition is adequate, patient feeds self, adequate time for meals, and intake is being monitored. Will cont to monitor and reassess.     4/1/2021 0026 by Kingston Guevara RN  Outcome: Ongoing

## 2021-04-03 LAB
BLOOD CULTURE, ROUTINE: NORMAL
CULTURE, BLOOD 2: NORMAL

## 2021-04-21 ENCOUNTER — APPOINTMENT (OUTPATIENT)
Dept: ULTRASOUND IMAGING | Age: 51
DRG: 710 | End: 2021-04-21
Payer: MEDICAID

## 2021-04-21 ENCOUNTER — TELEPHONE (OUTPATIENT)
Dept: FAMILY MEDICINE CLINIC | Age: 51
End: 2021-04-21

## 2021-04-21 ENCOUNTER — HOSPITAL ENCOUNTER (INPATIENT)
Age: 51
LOS: 6 days | Discharge: HOME | DRG: 710 | End: 2021-04-28
Attending: EMERGENCY MEDICINE | Admitting: STUDENT IN AN ORGANIZED HEALTH CARE EDUCATION/TRAINING PROGRAM
Payer: MEDICAID

## 2021-04-21 DIAGNOSIS — F41.9 ANXIETY: ICD-10-CM

## 2021-04-21 DIAGNOSIS — A41.9 SEPTICEMIA (HCC): ICD-10-CM

## 2021-04-21 DIAGNOSIS — N49.2 SCROTAL INFECTION: Primary | ICD-10-CM

## 2021-04-21 LAB
A/G RATIO: 0.6 (ref 1.1–2.2)
ALBUMIN SERPL-MCNC: 3.2 G/DL (ref 3.4–5)
ALP BLD-CCNC: 97 U/L (ref 40–129)
ALT SERPL-CCNC: 19 U/L (ref 10–40)
ANION GAP SERPL CALCULATED.3IONS-SCNC: 14 MMOL/L (ref 3–16)
AST SERPL-CCNC: 18 U/L (ref 15–37)
BACTERIA: ABNORMAL /HPF
BASOPHILS ABSOLUTE: 0.1 K/UL (ref 0–0.2)
BASOPHILS RELATIVE PERCENT: 0.3 %
BILIRUB SERPL-MCNC: 0.4 MG/DL (ref 0–1)
BILIRUBIN URINE: ABNORMAL
BLOOD, URINE: ABNORMAL
BUN BLDV-MCNC: 13 MG/DL (ref 7–20)
CALCIUM SERPL-MCNC: 9.4 MG/DL (ref 8.3–10.6)
CHLORIDE BLD-SCNC: 95 MMOL/L (ref 99–110)
CLARITY: ABNORMAL
CO2: 22 MMOL/L (ref 21–32)
COLOR: ABNORMAL
CREAT SERPL-MCNC: 0.9 MG/DL (ref 0.9–1.3)
CRYSTALS, UA: ABNORMAL /HPF
CRYSTALS, UA: ABNORMAL /HPF
EOSINOPHILS ABSOLUTE: 0.1 K/UL (ref 0–0.6)
EOSINOPHILS RELATIVE PERCENT: 0.5 %
EPITHELIAL CELLS, UA: 0 /HPF (ref 0–5)
GFR AFRICAN AMERICAN: >60
GFR NON-AFRICAN AMERICAN: >60
GLOBULIN: 5 G/DL
GLUCOSE BLD-MCNC: 123 MG/DL (ref 70–99)
GLUCOSE URINE: NEGATIVE MG/DL
HCT VFR BLD CALC: 34.1 % (ref 40.5–52.5)
HEMOGLOBIN: 11.5 G/DL (ref 13.5–17.5)
HYALINE CASTS: 16 /LPF (ref 0–8)
KETONES, URINE: NEGATIVE MG/DL
LACTIC ACID: 1.3 MMOL/L (ref 0.4–2)
LEUKOCYTE ESTERASE, URINE: ABNORMAL
LYMPHOCYTES ABSOLUTE: 1.9 K/UL (ref 1–5.1)
LYMPHOCYTES RELATIVE PERCENT: 8.3 %
MCH RBC QN AUTO: 27.3 PG (ref 26–34)
MCHC RBC AUTO-ENTMCNC: 33.6 G/DL (ref 31–36)
MCV RBC AUTO: 81.3 FL (ref 80–100)
MICROSCOPIC EXAMINATION: YES
MONOCYTES ABSOLUTE: 1.4 K/UL (ref 0–1.3)
MONOCYTES RELATIVE PERCENT: 6.2 %
NEUTROPHILS ABSOLUTE: 19.9 K/UL (ref 1.7–7.7)
NEUTROPHILS RELATIVE PERCENT: 84.7 %
NITRITE, URINE: NEGATIVE
PDW BLD-RTO: 13.3 % (ref 12.4–15.4)
PH UA: 5.5 (ref 5–8)
PLATELET # BLD: 334 K/UL (ref 135–450)
PMV BLD AUTO: 8.7 FL (ref 5–10.5)
POTASSIUM REFLEX MAGNESIUM: 4.1 MMOL/L (ref 3.5–5.1)
PROTEIN UA: 30 MG/DL
RBC # BLD: 4.2 M/UL (ref 4.2–5.9)
RBC UA: ABNORMAL /HPF (ref 0–4)
SODIUM BLD-SCNC: 131 MMOL/L (ref 136–145)
SPECIFIC GRAVITY UA: 1.03 (ref 1–1.03)
TOTAL PROTEIN: 8.2 G/DL (ref 6.4–8.2)
URINE REFLEX TO CULTURE: YES
URINE TYPE: ABNORMAL
UROBILINOGEN, URINE: 2 E.U./DL
WBC # BLD: 23.4 K/UL (ref 4–11)
WBC UA: 54 /HPF (ref 0–5)

## 2021-04-21 PROCEDURE — 96365 THER/PROPH/DIAG IV INF INIT: CPT

## 2021-04-21 PROCEDURE — 86140 C-REACTIVE PROTEIN: CPT

## 2021-04-21 PROCEDURE — 2580000003 HC RX 258: Performed by: PHYSICIAN ASSISTANT

## 2021-04-21 PROCEDURE — 6360000002 HC RX W HCPCS: Performed by: PHYSICIAN ASSISTANT

## 2021-04-21 PROCEDURE — 87150 DNA/RNA AMPLIFIED PROBE: CPT

## 2021-04-21 PROCEDURE — 2500000003 HC RX 250 WO HCPCS: Performed by: PHYSICIAN ASSISTANT

## 2021-04-21 PROCEDURE — 76870 US EXAM SCROTUM: CPT

## 2021-04-21 PROCEDURE — 83605 ASSAY OF LACTIC ACID: CPT

## 2021-04-21 PROCEDURE — 80053 COMPREHEN METABOLIC PANEL: CPT

## 2021-04-21 PROCEDURE — 87040 BLOOD CULTURE FOR BACTERIA: CPT

## 2021-04-21 PROCEDURE — 81001 URINALYSIS AUTO W/SCOPE: CPT

## 2021-04-21 PROCEDURE — 96367 TX/PROPH/DG ADDL SEQ IV INF: CPT

## 2021-04-21 PROCEDURE — 99283 EMERGENCY DEPT VISIT LOW MDM: CPT

## 2021-04-21 PROCEDURE — 87086 URINE CULTURE/COLONY COUNT: CPT

## 2021-04-21 PROCEDURE — 85025 COMPLETE CBC W/AUTO DIFF WBC: CPT

## 2021-04-21 RX ORDER — 0.9 % SODIUM CHLORIDE 0.9 %
1000 INTRAVENOUS SOLUTION INTRAVENOUS ONCE
Status: COMPLETED | OUTPATIENT
Start: 2021-04-21 | End: 2021-04-21

## 2021-04-21 RX ORDER — CIPROFLOXACIN 2 MG/ML
400 INJECTION, SOLUTION INTRAVENOUS ONCE
Status: DISCONTINUED | OUTPATIENT
Start: 2021-04-21 | End: 2021-04-21

## 2021-04-21 RX ORDER — SODIUM CHLORIDE 9 MG/ML
INJECTION, SOLUTION INTRAVENOUS CONTINUOUS
Status: ACTIVE | OUTPATIENT
Start: 2021-04-22 | End: 2021-04-22

## 2021-04-21 RX ADMIN — SODIUM CHLORIDE 1000 ML: 9 INJECTION, SOLUTION INTRAVENOUS at 20:32

## 2021-04-21 RX ADMIN — METRONIDAZOLE 500 MG: 500 INJECTION, SOLUTION INTRAVENOUS at 22:29

## 2021-04-21 RX ADMIN — CEFTRIAXONE 1000 MG: 1 INJECTION, POWDER, FOR SOLUTION INTRAMUSCULAR; INTRAVENOUS at 20:51

## 2021-04-21 ASSESSMENT — ENCOUNTER SYMPTOMS
SHORTNESS OF BREATH: 0
NAUSEA: 0
CHEST TIGHTNESS: 0
ABDOMINAL PAIN: 0
VOMITING: 0

## 2021-04-21 NOTE — TELEPHONE ENCOUNTER
PT called back in regarding earlier encounter. Informed PT of Dr. Tee Rice note about going to the ER and that Dr. Sonya Mancuso is no longer at the office. PT expressed understand and will go to the ER.

## 2021-04-21 NOTE — TELEPHONE ENCOUNTER
PT's wife Kaleigh Mcgowan called in regarding her . She says that roughly a week or two past a recent surgery he had that when he's not fully awake she says PT hallucinates and \"sees\" people or claims to see people near him when there is no one there. She adv that when PT is fully awake that you can have a normal conversation and that these hallucinations don't occur but she's not sure what to do when they do happen.      Please call Isela back: [phone]

## 2021-04-21 NOTE — ED TRIAGE NOTES
Patient came in from home complaining of dehydration. States he doesn't think he has been drinking enough water and believes he has a persistent UTI. States he had a wright placed two months ago and thinks the UTI has been there since. Family also state he has been confused recently. Has MRI on Friday to look at bladder and surroundings. A&O x4.  Vss.

## 2021-04-22 ENCOUNTER — ANESTHESIA (OUTPATIENT)
Dept: OPERATING ROOM | Age: 51
DRG: 710 | End: 2021-04-22
Payer: MEDICAID

## 2021-04-22 ENCOUNTER — ANESTHESIA EVENT (OUTPATIENT)
Dept: OPERATING ROOM | Age: 51
DRG: 710 | End: 2021-04-22
Payer: MEDICAID

## 2021-04-22 VITALS
SYSTOLIC BLOOD PRESSURE: 105 MMHG | DIASTOLIC BLOOD PRESSURE: 58 MMHG | TEMPERATURE: 99.9 F | RESPIRATION RATE: 12 BRPM | OXYGEN SATURATION: 100 %

## 2021-04-22 PROBLEM — R82.71 BACTERIURIA: Status: ACTIVE | Noted: 2021-04-22

## 2021-04-22 PROBLEM — R44.3 HALLUCINATIONS: Status: ACTIVE | Noted: 2021-04-22

## 2021-04-22 PROBLEM — D72.829 LEUKOCYTOSIS: Status: ACTIVE | Noted: 2021-03-29

## 2021-04-22 PROBLEM — N50.89 SCROTAL SWELLING: Status: ACTIVE | Noted: 2021-04-22

## 2021-04-22 LAB
ANION GAP SERPL CALCULATED.3IONS-SCNC: 10 MMOL/L (ref 3–16)
BASOPHILS ABSOLUTE: 0 K/UL (ref 0–0.2)
BASOPHILS RELATIVE PERCENT: 0.2 %
BUN BLDV-MCNC: 11 MG/DL (ref 7–20)
C-REACTIVE PROTEIN: 164.9 MG/L (ref 0–5.1)
CALCIUM SERPL-MCNC: 8.7 MG/DL (ref 8.3–10.6)
CHLORIDE BLD-SCNC: 98 MMOL/L (ref 99–110)
CO2: 26 MMOL/L (ref 21–32)
CREAT SERPL-MCNC: 0.8 MG/DL (ref 0.9–1.3)
EOSINOPHILS ABSOLUTE: 0.1 K/UL (ref 0–0.6)
EOSINOPHILS RELATIVE PERCENT: 0.8 %
GFR AFRICAN AMERICAN: >60
GFR NON-AFRICAN AMERICAN: >60
GLUCOSE BLD-MCNC: 131 MG/DL (ref 70–99)
HCT VFR BLD CALC: 29.5 % (ref 40.5–52.5)
HEMOGLOBIN: 10 G/DL (ref 13.5–17.5)
LYMPHOCYTES ABSOLUTE: 1.1 K/UL (ref 1–5.1)
LYMPHOCYTES RELATIVE PERCENT: 6.2 %
MAGNESIUM: 1.8 MG/DL (ref 1.8–2.4)
MCH RBC QN AUTO: 27.3 PG (ref 26–34)
MCHC RBC AUTO-ENTMCNC: 33.9 G/DL (ref 31–36)
MCV RBC AUTO: 80.6 FL (ref 80–100)
MONOCYTES ABSOLUTE: 1 K/UL (ref 0–1.3)
MONOCYTES RELATIVE PERCENT: 5.2 %
NEUTROPHILS ABSOLUTE: 16 K/UL (ref 1.7–7.7)
NEUTROPHILS RELATIVE PERCENT: 87.6 %
PDW BLD-RTO: 13.4 % (ref 12.4–15.4)
PLATELET # BLD: 295 K/UL (ref 135–450)
PMV BLD AUTO: 9.5 FL (ref 5–10.5)
POTASSIUM SERPL-SCNC: 4 MMOL/L (ref 3.5–5.1)
RBC # BLD: 3.66 M/UL (ref 4.2–5.9)
SARS-COV-2, NAAT: NOT DETECTED
SEDIMENTATION RATE, ERYTHROCYTE: 119 MM/HR (ref 0–15)
SODIUM BLD-SCNC: 134 MMOL/L (ref 136–145)
URINE CULTURE, ROUTINE: NORMAL
WBC # BLD: 18.2 K/UL (ref 4–11)

## 2021-04-22 PROCEDURE — 80048 BASIC METABOLIC PNL TOTAL CA: CPT

## 2021-04-22 PROCEDURE — 85652 RBC SED RATE AUTOMATED: CPT

## 2021-04-22 PROCEDURE — 87076 CULTURE ANAEROBE IDENT EACH: CPT

## 2021-04-22 PROCEDURE — 87070 CULTURE OTHR SPECIMN AEROBIC: CPT

## 2021-04-22 PROCEDURE — 6360000002 HC RX W HCPCS: Performed by: STUDENT IN AN ORGANIZED HEALTH CARE EDUCATION/TRAINING PROGRAM

## 2021-04-22 PROCEDURE — 6360000002 HC RX W HCPCS: Performed by: UROLOGY

## 2021-04-22 PROCEDURE — 97535 SELF CARE MNGMENT TRAINING: CPT

## 2021-04-22 PROCEDURE — 2580000003 HC RX 258: Performed by: ANESTHESIOLOGY

## 2021-04-22 PROCEDURE — 36415 COLL VENOUS BLD VENIPUNCTURE: CPT

## 2021-04-22 PROCEDURE — 2580000003 HC RX 258: Performed by: UROLOGY

## 2021-04-22 PROCEDURE — 94761 N-INVAS EAR/PLS OXIMETRY MLT: CPT

## 2021-04-22 PROCEDURE — 97166 OT EVAL MOD COMPLEX 45 MIN: CPT

## 2021-04-22 PROCEDURE — 7100000001 HC PACU RECOVERY - ADDTL 15 MIN: Performed by: UROLOGY

## 2021-04-22 PROCEDURE — 2580000003 HC RX 258: Performed by: STUDENT IN AN ORGANIZED HEALTH CARE EDUCATION/TRAINING PROGRAM

## 2021-04-22 PROCEDURE — 1200000000 HC SEMI PRIVATE

## 2021-04-22 PROCEDURE — 6360000002 HC RX W HCPCS

## 2021-04-22 PROCEDURE — 87075 CULTR BACTERIA EXCEPT BLOOD: CPT

## 2021-04-22 PROCEDURE — 97161 PT EVAL LOW COMPLEX 20 MIN: CPT

## 2021-04-22 PROCEDURE — 97116 GAIT TRAINING THERAPY: CPT

## 2021-04-22 PROCEDURE — 0W9M0ZZ DRAINAGE OF MALE PERINEUM, OPEN APPROACH: ICD-10-PCS | Performed by: INTERNAL MEDICINE

## 2021-04-22 PROCEDURE — 97530 THERAPEUTIC ACTIVITIES: CPT

## 2021-04-22 PROCEDURE — 2709999900 HC NON-CHARGEABLE SUPPLY: Performed by: UROLOGY

## 2021-04-22 PROCEDURE — 0T9B00Z DRAINAGE OF BLADDER WITH DRAINAGE DEVICE, OPEN APPROACH: ICD-10-PCS | Performed by: INTERNAL MEDICINE

## 2021-04-22 PROCEDURE — 85025 COMPLETE CBC W/AUTO DIFF WBC: CPT

## 2021-04-22 PROCEDURE — 87635 SARS-COV-2 COVID-19 AMP PRB: CPT

## 2021-04-22 PROCEDURE — C1769 GUIDE WIRE: HCPCS | Performed by: UROLOGY

## 2021-04-22 PROCEDURE — 2500000003 HC RX 250 WO HCPCS

## 2021-04-22 PROCEDURE — 83735 ASSAY OF MAGNESIUM: CPT

## 2021-04-22 PROCEDURE — 6360000002 HC RX W HCPCS: Performed by: INTERNAL MEDICINE

## 2021-04-22 PROCEDURE — 3700000001 HC ADD 15 MINUTES (ANESTHESIA): Performed by: UROLOGY

## 2021-04-22 PROCEDURE — 3600000002 HC SURGERY LEVEL 2 BASE: Performed by: UROLOGY

## 2021-04-22 PROCEDURE — 2580000003 HC RX 258: Performed by: INTERNAL MEDICINE

## 2021-04-22 PROCEDURE — 7100000000 HC PACU RECOVERY - FIRST 15 MIN: Performed by: UROLOGY

## 2021-04-22 PROCEDURE — 6370000000 HC RX 637 (ALT 250 FOR IP): Performed by: HOSPITALIST

## 2021-04-22 PROCEDURE — 87205 SMEAR GRAM STAIN: CPT

## 2021-04-22 PROCEDURE — 3700000000 HC ANESTHESIA ATTENDED CARE: Performed by: UROLOGY

## 2021-04-22 PROCEDURE — 3600000012 HC SURGERY LEVEL 2 ADDTL 15MIN: Performed by: UROLOGY

## 2021-04-22 RX ORDER — OXYCODONE HYDROCHLORIDE AND ACETAMINOPHEN 5; 325 MG/1; MG/1
1 TABLET ORAL PRN
Status: DISCONTINUED | OUTPATIENT
Start: 2021-04-22 | End: 2021-04-22 | Stop reason: HOSPADM

## 2021-04-22 RX ORDER — MIDAZOLAM HYDROCHLORIDE 1 MG/ML
INJECTION INTRAMUSCULAR; INTRAVENOUS PRN
Status: DISCONTINUED | OUTPATIENT
Start: 2021-04-22 | End: 2021-04-22 | Stop reason: SDUPTHER

## 2021-04-22 RX ORDER — QUETIAPINE FUMARATE 25 MG/1
25 TABLET, FILM COATED ORAL 2 TIMES DAILY
Status: DISCONTINUED | OUTPATIENT
Start: 2021-04-22 | End: 2021-04-28 | Stop reason: HOSPADM

## 2021-04-22 RX ORDER — SODIUM CHLORIDE 9 MG/ML
INJECTION, SOLUTION INTRAVENOUS CONTINUOUS
Status: DISCONTINUED | OUTPATIENT
Start: 2021-04-22 | End: 2021-04-22

## 2021-04-22 RX ORDER — ACETAMINOPHEN 650 MG/1
650 SUPPOSITORY RECTAL EVERY 6 HOURS PRN
Status: DISCONTINUED | OUTPATIENT
Start: 2021-04-22 | End: 2021-04-28 | Stop reason: HOSPADM

## 2021-04-22 RX ORDER — SUCCINYLCHOLINE/SOD CL,ISO/PF 200MG/10ML
SYRINGE (ML) INTRAVENOUS PRN
Status: DISCONTINUED | OUTPATIENT
Start: 2021-04-22 | End: 2021-04-22 | Stop reason: SDUPTHER

## 2021-04-22 RX ORDER — FENTANYL CITRATE 50 UG/ML
25 INJECTION, SOLUTION INTRAMUSCULAR; INTRAVENOUS EVERY 5 MIN PRN
Status: DISCONTINUED | OUTPATIENT
Start: 2021-04-22 | End: 2021-04-22 | Stop reason: HOSPADM

## 2021-04-22 RX ORDER — LIDOCAINE HYDROCHLORIDE 20 MG/ML
INJECTION, SOLUTION EPIDURAL; INFILTRATION; INTRACAUDAL; PERINEURAL PRN
Status: DISCONTINUED | OUTPATIENT
Start: 2021-04-22 | End: 2021-04-22 | Stop reason: SDUPTHER

## 2021-04-22 RX ORDER — SODIUM CHLORIDE 0.9 % (FLUSH) 0.9 %
5-40 SYRINGE (ML) INJECTION EVERY 12 HOURS SCHEDULED
Status: DISCONTINUED | OUTPATIENT
Start: 2021-04-22 | End: 2021-04-22 | Stop reason: HOSPADM

## 2021-04-22 RX ORDER — DEXAMETHASONE SODIUM PHOSPHATE 4 MG/ML
INJECTION, SOLUTION INTRA-ARTICULAR; INTRALESIONAL; INTRAMUSCULAR; INTRAVENOUS; SOFT TISSUE PRN
Status: DISCONTINUED | OUTPATIENT
Start: 2021-04-22 | End: 2021-04-22 | Stop reason: SDUPTHER

## 2021-04-22 RX ORDER — PHENYLEPHRINE HCL IN 0.9% NACL 1 MG/10 ML
SYRINGE (ML) INTRAVENOUS PRN
Status: DISCONTINUED | OUTPATIENT
Start: 2021-04-22 | End: 2021-04-22 | Stop reason: SDUPTHER

## 2021-04-22 RX ORDER — ACETAMINOPHEN 325 MG/1
650 TABLET ORAL EVERY 6 HOURS PRN
Status: DISCONTINUED | OUTPATIENT
Start: 2021-04-22 | End: 2021-04-28 | Stop reason: HOSPADM

## 2021-04-22 RX ORDER — SODIUM CHLORIDE 0.9 % (FLUSH) 0.9 %
5-40 SYRINGE (ML) INJECTION PRN
Status: DISCONTINUED | OUTPATIENT
Start: 2021-04-22 | End: 2021-04-28 | Stop reason: HOSPADM

## 2021-04-22 RX ORDER — ONDANSETRON 2 MG/ML
4 INJECTION INTRAMUSCULAR; INTRAVENOUS
Status: DISCONTINUED | OUTPATIENT
Start: 2021-04-22 | End: 2021-04-22 | Stop reason: HOSPADM

## 2021-04-22 RX ORDER — MAGNESIUM HYDROXIDE 1200 MG/15ML
LIQUID ORAL
Status: COMPLETED | OUTPATIENT
Start: 2021-04-22 | End: 2021-04-22

## 2021-04-22 RX ORDER — DEXMEDETOMIDINE HYDROCHLORIDE 100 UG/ML
INJECTION, SOLUTION INTRAVENOUS PRN
Status: DISCONTINUED | OUTPATIENT
Start: 2021-04-22 | End: 2021-04-22 | Stop reason: SDUPTHER

## 2021-04-22 RX ORDER — SODIUM CHLORIDE 0.9 % (FLUSH) 0.9 %
5-40 SYRINGE (ML) INJECTION PRN
Status: DISCONTINUED | OUTPATIENT
Start: 2021-04-22 | End: 2021-04-22 | Stop reason: HOSPADM

## 2021-04-22 RX ORDER — MAGNESIUM HYDROXIDE 1200 MG/15ML
LIQUID ORAL CONTINUOUS PRN
Status: COMPLETED | OUTPATIENT
Start: 2021-04-22 | End: 2021-04-22

## 2021-04-22 RX ORDER — OXYCODONE HYDROCHLORIDE AND ACETAMINOPHEN 5; 325 MG/1; MG/1
2 TABLET ORAL PRN
Status: DISCONTINUED | OUTPATIENT
Start: 2021-04-22 | End: 2021-04-22 | Stop reason: HOSPADM

## 2021-04-22 RX ORDER — FENTANYL CITRATE 50 UG/ML
INJECTION, SOLUTION INTRAMUSCULAR; INTRAVENOUS PRN
Status: DISCONTINUED | OUTPATIENT
Start: 2021-04-22 | End: 2021-04-22 | Stop reason: SDUPTHER

## 2021-04-22 RX ORDER — SODIUM CHLORIDE 0.9 % (FLUSH) 0.9 %
5-40 SYRINGE (ML) INJECTION EVERY 12 HOURS SCHEDULED
Status: DISCONTINUED | OUTPATIENT
Start: 2021-04-22 | End: 2021-04-26 | Stop reason: SDUPTHER

## 2021-04-22 RX ORDER — SODIUM CHLORIDE 9 MG/ML
25 INJECTION, SOLUTION INTRAVENOUS PRN
Status: DISCONTINUED | OUTPATIENT
Start: 2021-04-22 | End: 2021-04-22 | Stop reason: HOSPADM

## 2021-04-22 RX ORDER — ONDANSETRON 2 MG/ML
INJECTION INTRAMUSCULAR; INTRAVENOUS PRN
Status: DISCONTINUED | OUTPATIENT
Start: 2021-04-22 | End: 2021-04-22 | Stop reason: SDUPTHER

## 2021-04-22 RX ORDER — SODIUM CHLORIDE 9 MG/ML
25 INJECTION, SOLUTION INTRAVENOUS PRN
Status: DISCONTINUED | OUTPATIENT
Start: 2021-04-22 | End: 2021-04-26 | Stop reason: SDUPTHER

## 2021-04-22 RX ORDER — PROPOFOL 10 MG/ML
INJECTION, EMULSION INTRAVENOUS PRN
Status: DISCONTINUED | OUTPATIENT
Start: 2021-04-22 | End: 2021-04-22 | Stop reason: SDUPTHER

## 2021-04-22 RX ADMIN — FENTANYL CITRATE 100 MCG: 50 INJECTION INTRAMUSCULAR; INTRAVENOUS at 16:05

## 2021-04-22 RX ADMIN — HYDROMORPHONE HYDROCHLORIDE 1 MG: 1 INJECTION, SOLUTION INTRAMUSCULAR; INTRAVENOUS; SUBCUTANEOUS at 16:22

## 2021-04-22 RX ADMIN — SODIUM CHLORIDE: 9 INJECTION, SOLUTION INTRAVENOUS at 16:00

## 2021-04-22 RX ADMIN — LIDOCAINE HYDROCHLORIDE 100 MG: 20 INJECTION, SOLUTION EPIDURAL; INFILTRATION; INTRACAUDAL; PERINEURAL at 16:07

## 2021-04-22 RX ADMIN — PIPERACILLIN AND TAZOBACTAM 3375 MG: 3; .375 INJECTION, POWDER, LYOPHILIZED, FOR SOLUTION INTRAVENOUS at 00:16

## 2021-04-22 RX ADMIN — DEXMEDETOMIDINE HYDROCHLORIDE 4 MCG: 100 INJECTION, SOLUTION INTRAVENOUS at 16:38

## 2021-04-22 RX ADMIN — Medication 120 MG: at 16:07

## 2021-04-22 RX ADMIN — Medication 1500 MG: at 13:44

## 2021-04-22 RX ADMIN — QUETIAPINE FUMARATE 25 MG: 25 TABLET ORAL at 23:46

## 2021-04-22 RX ADMIN — DEXMEDETOMIDINE HYDROCHLORIDE 4 MCG: 100 INJECTION, SOLUTION INTRAVENOUS at 16:48

## 2021-04-22 RX ADMIN — CEFEPIME HYDROCHLORIDE 2000 MG: 2 INJECTION, POWDER, FOR SOLUTION INTRAVENOUS at 10:57

## 2021-04-22 RX ADMIN — ONDANSETRON 4 MG: 2 INJECTION INTRAMUSCULAR; INTRAVENOUS at 16:22

## 2021-04-22 RX ADMIN — DEXMEDETOMIDINE HYDROCHLORIDE 4 MCG: 100 INJECTION, SOLUTION INTRAVENOUS at 16:32

## 2021-04-22 RX ADMIN — PROPOFOL 100 MG: 10 INJECTION, EMULSION INTRAVENOUS at 16:38

## 2021-04-22 RX ADMIN — PROPOFOL 40 MG: 10 INJECTION, EMULSION INTRAVENOUS at 16:17

## 2021-04-22 RX ADMIN — ENOXAPARIN SODIUM 40 MG: 40 INJECTION SUBCUTANEOUS at 10:54

## 2021-04-22 RX ADMIN — MIDAZOLAM 1 MG: 1 INJECTION INTRAMUSCULAR; INTRAVENOUS at 16:00

## 2021-04-22 RX ADMIN — Medication 1500 MG: at 03:03

## 2021-04-22 RX ADMIN — CEFEPIME HYDROCHLORIDE 2000 MG: 2 INJECTION, POWDER, FOR SOLUTION INTRAVENOUS at 23:04

## 2021-04-22 RX ADMIN — DEXMEDETOMIDINE HYDROCHLORIDE 4 MCG: 100 INJECTION, SOLUTION INTRAVENOUS at 16:17

## 2021-04-22 RX ADMIN — DEXMEDETOMIDINE HYDROCHLORIDE 4 MCG: 100 INJECTION, SOLUTION INTRAVENOUS at 16:23

## 2021-04-22 RX ADMIN — DEXAMETHASONE SODIUM PHOSPHATE 4 MG: 4 INJECTION, SOLUTION INTRAMUSCULAR; INTRAVENOUS at 16:22

## 2021-04-22 RX ADMIN — PROPOFOL 100 MG: 10 INJECTION, EMULSION INTRAVENOUS at 16:07

## 2021-04-22 RX ADMIN — SODIUM CHLORIDE: 9 INJECTION, SOLUTION INTRAVENOUS at 19:12

## 2021-04-22 RX ADMIN — SODIUM CHLORIDE: 9 INJECTION, SOLUTION INTRAVENOUS at 00:16

## 2021-04-22 RX ADMIN — Medication 100 MCG: at 16:44

## 2021-04-22 ASSESSMENT — PULMONARY FUNCTION TESTS
PIF_VALUE: 20
PIF_VALUE: 23
PIF_VALUE: 20
PIF_VALUE: 20
PIF_VALUE: 23
PIF_VALUE: 18
PIF_VALUE: 8
PIF_VALUE: 25
PIF_VALUE: 1
PIF_VALUE: 25
PIF_VALUE: 10
PIF_VALUE: 7
PIF_VALUE: 7
PIF_VALUE: 5
PIF_VALUE: 22
PIF_VALUE: 4
PIF_VALUE: 7
PIF_VALUE: 25
PIF_VALUE: 4
PIF_VALUE: 22
PIF_VALUE: 39
PIF_VALUE: 39
PIF_VALUE: 23
PIF_VALUE: 23
PIF_VALUE: 1
PIF_VALUE: 4
PIF_VALUE: 22
PIF_VALUE: 25
PIF_VALUE: 22
PIF_VALUE: 0
PIF_VALUE: 25
PIF_VALUE: 35
PIF_VALUE: 7
PIF_VALUE: 25
PIF_VALUE: 25
PIF_VALUE: 24
PIF_VALUE: 24
PIF_VALUE: 41
PIF_VALUE: 23
PIF_VALUE: 1
PIF_VALUE: 17
PIF_VALUE: 27
PIF_VALUE: 4

## 2021-04-22 ASSESSMENT — PAIN SCALES - GENERAL
PAINLEVEL_OUTOF10: 0
PAINLEVEL_OUTOF10: 0

## 2021-04-22 ASSESSMENT — PAIN DESCRIPTION - PROGRESSION
CLINICAL_PROGRESSION: GRADUALLY IMPROVING
CLINICAL_PROGRESSION: GRADUALLY IMPROVING

## 2021-04-22 ASSESSMENT — PAIN - FUNCTIONAL ASSESSMENT: PAIN_FUNCTIONAL_ASSESSMENT: 0-10

## 2021-04-22 ASSESSMENT — ENCOUNTER SYMPTOMS: SHORTNESS OF BREATH: 0

## 2021-04-22 NOTE — PROGRESS NOTES
To pacu from OR. Pt asleep with oral airway in place. Dressing to scrotum dry and intact. SP catheter draining aaron urine with sediment. IV infusing. Monitor in sinus rhythm.

## 2021-04-22 NOTE — PROGRESS NOTES
Pt having worsening confusion. Talking in inappropriate sentences and asked me if I was unable to get in because equipment wasn't working and then asked me if a scrotum was one of those things that \"float around in your body\". Dr. Shaneka Pineda notified of worsening confusion.  Electronically signed by Rhea Arias RN on 4/22/2021 at 12:47 PM
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
## 2021-04-22 NOTE — PROGRESS NOTES
CT of head ordered per Dr. Yosi Echols d/t increased confusion.  Electronically signed by Jolynn Gee RN on 4/22/2021 at 6:31 PM

## 2021-04-22 NOTE — ED PROVIDER NOTES
for chills and fever. HENT: Negative. Respiratory: Negative for chest tightness and shortness of breath. Cardiovascular: Negative. Gastrointestinal: Negative for abdominal pain, nausea and vomiting. Genitourinary: Positive for scrotal swelling and testicular pain. Musculoskeletal: Negative for arthralgias and myalgias. Skin: Negative. Neurological: Negative for dizziness and light-headedness. Psychiatric/Behavioral: Positive for hallucinations. Negative for behavioral problems and confusion. Except as noted above the remainder of the review of systems was reviewed and negative. PAST MEDICAL HISTORY         Diagnosis Date    Anxiety     was on paxil in the past    Elevated LFTs     History of tobacco abuse     Hypertension     Prediabetes        SURGICAL HISTORY           Procedure Laterality Date    CYSTOSCOPY N/A 3/29/2021    CYSTOSCOPY,  SUPRAPUBIC TUBE INSERTION performed by Yi Kumar MD at [address] [facility] Right 4/22/2021    INCISION AND DRAINAGE SCROTAL ABSCESS, CYSTOSCOPY, EXCHANGE OF SUPRA PUBIC CATHETER performed by Yi Kumar MD at [address]     [unfilled]    ALLERGIES     Patient has no known allergies. FAMILY HISTORY           Problem Relation Age of Onset    Stroke Father     Asthma Sister     High Blood Pressure Brother      Family Status   Relation Name Status    Father  (Not Specified)    Sister  (Not Specified)    Brother  (Not Specified)        SOCIAL HISTORY      reports that he quit smoking about 13 years ago. He has a 20.00 pack-year smoking history. He has quit using smokeless tobacco. He reports that he does not drink alcohol or use drugs.     PHYSICAL EXAM    (up to 7 for level 4, 8 or more for level 5)     ED Triage Vitals [04/21/21 1822]   Enc Vitals Group      /66      Pulse 99      Resp 18      Temp 97 °F (36.1 °C)      Temp Source Temporal      SpO2 97 %      Weight 248 Notable for the following components:       Result Value    Blood Culture, Routine   (*)     Value: Gram stain Aerobic bottle:  Gram positive cocci in clusters  resembling Staphylococcus  Information to follow  Organism identification not detected by PCR (Film Array)  See additional report for complete BCID panel      All other components within normal limits    Narrative:     ORDER#: [id number]                          ORDERED BY: CHRISTINE MAC  SOURCE: Blood                              COLLECTED:  04/21/21 20:36  ANTIBIOTICS AT [facility].:                      RECEIVED :  04/21/21 20:56  If child <=2 yrs old please draw pediatric bottle. ~Blood Culture 1  Performed at:  [address] [address]   Phone [phone]   CULTURE, BLOOD 2 - Abnormal; Notable for the following components:    Culture, Blood 2   (*)     Value: Gram stain Aerobic bottle:  Gram positive cocci in clusters  resembling Staphylococcus  Information to follow      All other components within normal limits    Narrative:     ORDER#: [id number]                          ORDERED BY: CHRISTINE MAC  SOURCE: Blood                              COLLECTED:  04/21/21 20:38  ANTIBIOTICS AT [facility].:                      RECEIVED :  04/21/21 20:57  If child <=2 yrs old please draw pediatric bottle. ~Blood Culture #2  Performed at:  [facility]  [address] [place], [place] [address]   Phone [phone]   CBC WITH AUTO DIFFERENTIAL - Abnormal; Notable for the following components:    WBC 23.4 (*)     Hemoglobin 11.5 (*)     Hematocrit 34.1 (*)     Neutrophils Absolute 19.9 (*)     Monocytes Absolute 1.4 (*)     All other components within normal limits    Narrative:     Performed at:  [facility]  [address] [facility] [address]   Phone [phone]   COMPREHENSIVE METABOLIC PANEL W/ REFLEX TO MG FOR LOW K - Abnormal; Notable for the following components:    Sodium 131 (*)     Chloride 95 (*)     Glucose 123 (*)     Albumin 3.2 (*)     Albumin/Globulin Ratio 0.6 (*)     All other components within normal limits    Narrative:     Performed at:  [address] [place]   Phone [phone]   URINE RT REFLEX TO CULTURE - Abnormal; Notable for the following components:    Clarity, UA TURBID (*)     Bilirubin Urine SMALL (*)     Blood, Urine MODERATE (*)     Protein, UA 30 (*)     Urobilinogen, Urine 2.0 (*)     Leukocyte Esterase, Urine MODERATE (*)     All other components within normal limits    Narrative:     Performed at:  [address] [employer] [facility]   Phone [phone]   MICROSCOPIC URINALYSIS - Abnormal; Notable for the following components:    RBC, UA 5-10 (*)     Bacteria, UA 1+ (*)     Crystals, UA 2+ Uric Acid (*)     Crystals, UA Few Ca.  Oxalate (*)     Hyaline Casts, UA 16 (*)     WBC, UA 54 (*)     All other components within normal limits    Narrative:     Performed at:  [address] [place]   Phone [phone]   C-REACTIVE PROTEIN - Abnormal; Notable for the following components:    .9 (*)     All other components within normal limits    Narrative:     Performed at:  [address] [place]   Phone [phone]   BASIC METABOLIC PANEL - Abnormal; Notable for the following components:    Sodium 134 (*)     Chloride 98 (*)     Glucose 131 (*)     CREATININE 0.8 (*)     All other components within normal limits    Narrative:     Performed at:  [address] [place]   Phone [phone]   CBC WITH AUTO DIFFERENTIAL - Abnormal; Notable for the following components:    WBC 18.2 (*)     RBC 3.66 (*)     Hemoglobin 10.0 (*)     Hematocrit 29.5 (*)     Neutrophils Absolute 16.0 (*)     All other components within normal limits    Narrative:     Performed at:  [address] [address]   Phone [phone]   SEDIMENTATION RATE - Abnormal; Notable for the following components:    Sed Rate 119 (*)     All other components within normal limits    Narrative:     Performed at:  [address] [address]   Phone [phone]   BASIC METABOLIC PANEL - Abnormal; Notable for the following components:    Sodium 135 (*)     Glucose 103 (*)     CREATININE 0.6 (*)     All other components within normal limits    Narrative:     Collection has been rescheduled by TUCKS at 04/23/2021 07:09 Reason: No   suitable vein for venipuncture  Performed at:  [address] [address]   Phone [phone]   CBC WITH AUTO DIFFERENTIAL - Abnormal; Notable for the following components:    WBC 21.4 (*)     RBC 3.74 (*)     Hemoglobin 10.2 (*)     Hematocrit 30.5 (*)     Neutrophils Absolute 19.2 (*)     All other components within normal limits    Narrative:     Collection has been rescheduled by TUCKS at 04/23/2021 07:09 Reason: No   suitable vein for venipuncture  Performed at:  [address] [address]   Phone [phone]   CULTURE, URINE    Narrative:     ORDER#: [id number]                          ORDERED BY: GAL OLSEN  SOURCE: Urine Clean Catch                  COLLECTED:  04/21/21 18:25  ANTIBIOTICS AT [facility].:                      RECEIVED :  04/21/21 19:42  Performed at:  [facility]  [address] [place] [address]   Phone [phone] RAPID    Narrative:     Performed at:  [facility]  [address] [facility] [place] [address]   Phone [phone]   CULTURE, SURGICAL    Narrative:     ORDER#: [id number]                          ORDERED BY: Daniel Chavira  SOURCE: Scrotum                            COLLECTED:  04/22/21 16:25  ANTIBIOTICS AT [facility].:                      RECEIVED :  04/22/21 17:08  Performed at:  [facility]  [address] [facility] [place] [address]   Phone [phone]   CULTURE, BLOOD, PCR ID PANEL RESULTS REPORT    Narrative:     Navneet Decker  [id number] tel. [phone],  Microbiology results called to and read back by Juancarlos Smallwood RN, 04/23/2021  10:43, by [facility]  Referred out by:  [facility]  [address] [facility] [place] [address]   Phone [phone]   CULTURE, BLOOD 1   CULTURE, BLOOD 2   LACTIC ACID, PLASMA    Narrative:     Performed at:  [facility]  [address] [facility] [place] [facility] [address]   Phone [phone]   MAGNESIUM    Narrative:     Performed at:  [facility]  [address] [facility] [place] [address]   Phone [phone]   MAGNESIUM    Narrative:     Collection has been rescheduled by TUCKS at 04/23/2021 07:09 Reason: No   suitable vein for venipuncture  Performed at:  [facility]  [address] [facility] [place] [address]   Phone [phone]   Aiden Cameron    Narrative:     Performed at:  [facility]  [address] [facility] [place] [address]   Phone [phone]       All other labs were within normal range or not returned as of this dictation.     EMERGENCY DEPARTMENT COURSE and DIFFERENTIAL DIAGNOSIS/MDM:   Vitals:    Vitals:    04/23/21 0615 04/23/21 0829 04/23/21 0945 04/23/21 1440   BP:   139/88 (!) 137/103   Pulse:   74 86   Resp:   18 18   Temp:   97.8 °F (36.6 °C) 97.5 °F (36.4 °C)   TempSrc:   Oral Oral   SpO2:  94% 96% 97%   Weight: 237 lb 3.2 oz (107.6 kg)      Height:           [facility]     Patient presents ED with HPI noted above. Work-up in the ED as above. Physical exam as above. Abdomen soft and nontender throughout. No drainage, erythema or pain at suprapubic catheter site. Scrotal/testicular exam as above. There is concern for developing abscess. There is no necrosis skin or open wounds. Patient with leukocytosis with WBC of 23.4. Lactic acid within normal is. Blood cultures obtained. Patient started on ceftriaxone and Flagyl. Did speak with pharmacy regarding coverage and infectious concern. Patient will be admitted for further inpatient treatment, work-up, antibiotics and evaluation. Remainder of labs as above. Initial urine was taken from bag. Testicular/scrotal ultrasound pending at time of my departure. Please see my attending, Dr. Feliciano Amin, note regarding if any change from previously stated plan. CONSULTS:  PHARMACY TO DOSE VANCOMYCIN  IP CONSULT TO INFECTIOUS DISEASES  IP CONSULT TO UROLOGY  IP CONSULT TO PSYCHIATRY    PROCEDURES:  Procedures    FINAL IMPRESSION      1. Scrotal infection    2. Septicemia ([facility]          DISPOSITION/PLAN   [unfilled]    PATIENT REFERRED TO:  No follow-up provider specified.     DISCHARGE MEDICATIONS:  Current Discharge Medication List          (Please note that portions of this note were completed with a voice recognition program.  Efforts were made to edit the dictations but occasionally words are mis-transcribed.)    ANAHI Bonds, [place]  04/21/21 [address] ANAHI  04/21/21 [address] , [place]  04/23/21 0753

## 2021-04-22 NOTE — PROGRESS NOTES
Hospitalist Progress Note  4/22/2021 10:35 AM    PCP: Shaila Martines MD    [id number]     Date of Admission: 4/21/2021                                                                                                                     HOSPITAL COURSE    Patient demographics:  The patient  Marisa Ocasio is a 53 y.o. male     Significant past medical history:   Patient Active Problem List   Diagnosis    Anxiety    History of tobacco abuse    Discitis of lumbar region    Osteomyelitis of lumbar spine (HCC)    Weight loss, non-intentional    Prediabetes    Obesity due to excess calories with serious comorbidity    Obesity due to excess calories    Acute cystitis with hematuria    Urinary retention    Bilateral hydronephrosis    Acute midline low back pain    Morbid obesity due to excess calories (HCC)    Sepsis (HCC)    Tachycardia    Leukocytosis    Scrotal swelling    Bacteriuria    Hallucinations         Presenting symptoms:  Fatigue, hallucinations, dehydration, low p.o. fluid and oral intake    Diagnostic workup:      CONSULTS DURING ADMISSION :   PHARMACY TO DOSE VANCOMYCIN  IP CONSULT TO INFECTIOUS DISEASES  IP CONSULT TO UROLOGY  IP CONSULT TO PSYCHIATRY      Patient was diagnosed with:        Treatment while inpatient:  Pt presented to [facility] apparently sent in as a request of his significant other for concern that patient has been having some mild visual hallucinations and worsening fatigue with relation to his lack of oral fluid and food intake in the last few days                                                                                       ----------------------------------------------------------      SUBJECTIVE COMPLAINTS- follow up for Fatigue, hallucinations, dehydration,    Diet: Diet NPO Effective Now      OBJECTIVE:   Patient Active Problem List   Diagnosis    Anxiety    History of tobacco abuse    Discitis of lumbar region    Osteomyelitis of intact,  no neurologic deficits noted. PT/INR: No results for input(s): PROTIME, INR in the last 72 hours. APTT: No results for input(s): APTT in the last 72 hours. CBC:   Recent Labs     04/21/21 1825 04/22/21  0509   WBC 23.4* 18.2*   HGB 11.5* 10.0*   HCT 34.1* 29.5*   MCV 81.3 80.6    295       BMP:   Recent Labs     04/21/21 1825 04/22/21  0509   * 134*   K 4.1 4.0   CL 95* 98*   CO2 22 26   BUN 13 11   CREATININE 0.9 0.8*       LIVER PROFILE:   Recent Labs     04/21/21 1825   ALKPHOS 97   AST 18   ALT 19   BILITOT 0.4     No results for input(s): AMYLASE in the last 72 hours. No results for input(s): LIPASE in the last 72 hours. UA:  Recent Labs     04/21/21 1825   WBCUA 54*   RBCUA 5-10*       TROPONIN: No results for input(s): Earnie Lent in the last 72 hours. Lab Results   Component Value Date/Time    URRFLXCULT Yes 04/21/2021 06:25 PM       No results for input(s): TSHREFLEX in the last 72 hours. No components found for: [id number]  POC GLUCOSE:  No results for input(s): POCGLU in the last 72 hours. No results for input(s): LABA1C in the last 72 hours. Lab Results   Component Value Date    LABA1C 5.8 12/09/2020      Normal left ventricle size, wall thickness and systolic function with an   estimated ejection fraction of 55%(11/7/2017)    ASSESSMENT AND PLAN    Sepsis  Leukocytosis,  Scrotal  Swelling and  abscess  Urinary  Tract  infection  Continue  On  Cefepime  And  Vancomycin  Iv  fluids  Id  Is  Following      Scrotal  abscess  Urology  Is  following    Acute  Metabolic  encephalopathy  hallucinations  Start  Patient  On  seroquel  Consult  To  Psychiatry  If  No  improvement          Code Status: Full Code        Dispo -cc        The patient and / or the family were informed of the results of any tests, a time was given to answer questions, a plan was proposed and they agreed with plan.     Sarah Nguyen MD    This note was transcribed using Dragon

## 2021-04-22 NOTE — CONSULTS
[facility]       Social History:  Social History     Socioeconomic History    Marital status:      Spouse name: Not on file    Number of children: Not on file    Years of education: Not on file    Highest education level: Not on file   Occupational History    Not on file   Social Needs    Financial resource strain: Not on file    Food insecurity     Worry: Not on file     Inability: Not on file    Transportation needs     Medical: Not on file     Non-medical: Not on file   Tobacco Use    Smoking status: Former Smoker     Packs/day: 2.00     Years: 10.00     Pack years: 20.00     Quit date: 2007     Years since quittin.9    Smokeless tobacco: Former User   Substance and Sexual Activity    Alcohol use: No     Alcohol/week: 0.0 standard drinks    Drug use: No    Sexual activity: Never   Lifestyle    Physical activity     Days per week: Not on file     Minutes per session: Not on file    Stress: Not on file   Relationships    Social connections     Talks on phone: Not on file     Gets together: Not on file     Attends Denominational service: Not on file     Active member of club or organization: Not on file     Attends meetings of clubs or organizations: Not on file     Relationship status: Not on file    Intimate partner violence     Fear of current or ex partner: Not on file     Emotionally abused: Not on file     Physically abused: Not on file     Forced sexual activity: Not on file   Other Topics Concern    Not on file   Social History Narrative    Not on file       Family History:  Family History   Problem Relation Age of Onset    Stroke Father     Asthma Sister     High Blood Pressure Brother        Meds:   Current Facility-Administered Medications: vancomycin (VANCOCIN) 1500 mg in dextrose 5 % 250 mL IVPB, 1,500 mg, Intravenous, Q12H  vancomycin (VANCOCIN) intermittent dosing (placeholder), , Other, RX Placeholder  sodium chloride flush 0.9 % injection 5-40 mL, 5-40 mL, Intravenous, 2 times per day  sodium chloride flush 0.9 % injection 5-40 mL, 5-40 mL, Intravenous, PRN  0.9 % sodium chloride infusion, 25 mL, Intravenous, PRN  enoxaparin (LOVENOX) injection 40 mg, 40 mg, Subcutaneous, Daily  acetaminophen (TYLENOL) tablet 650 mg, 650 mg, Oral, Q6H PRN **OR** acetaminophen (TYLENOL) suppository 650 mg, 650 mg, Rectal, Q6H PRN  piperacillin-tazobactam (ZOSYN) 3,375 mg in dextrose 5 % 50 mL IVPB (mini-bag), 3,375 mg, Intravenous, Q6H  0.9 % sodium chloride infusion, , Intravenous, Continuous    Review of Systems:  10 Systems were reviewed and negative except as in HPI    Vitals:  BP (!) 161/72   Pulse 84   Temp 98.5 °F (36.9 °C) (Oral)   Resp 16   Ht 6' (1.829 m)   Wt 237 lb 3.4 oz (107.6 kg)   SpO2 95%   BMI 32.17 kg/m²     Intake/Output Summary (Last 24 hours) at 4/22/2021 0912  Last data filed at 4/22/2021 6102  Gross per 24 hour   Intake    Output 650 ml   Net -650 ml       Physical Exam:  General Appearance: Alert and oriented, cooperative, no distress, appears stated age  Head: Normocephalic, without obvious abnormality, atraumatic  Back: no CVA tenderness  Lungs: respirations unlabored, no wheezing  Heart: Regular rate and rhythm, no lower extremity edema noted  Abdomen: Soft, non-tender, non-distended, no masses  Skin: Skin color, texture, turgor normal, no rashes or lesions  Neurologic: no gross deficits  Male :   Nonpalpable bladder   No CVA tenderness   SP tube intact   Penis appears normal and circumcised   Urethral meatus is normal in size and location   Scrotum appears reddened R>L, right testicle firm non-tender and goes to his pubic bone.     PENNIE Not indicated    Labs:  CBC   Lab Results   Component Value Date    WBC 18.2 04/22/2021    RBC 3.66 04/22/2021    HGB 10.0 04/22/2021    HCT 29.5 04/22/2021    MCV 80.6 04/22/2021    MCH 27.3 04/22/2021    MCHC 33.9 04/22/2021    RDW 13.4 04/22/2021     04/22/2021    MPV 9.5 04/22/2021     BMP   Lab Results

## 2021-04-22 NOTE — PLAN OF CARE
Problem: Falls - Risk of:  Goal: Will remain free from falls  Description: Will remain free from falls  4/22/2021 1247 by Deniz Zuleta RN  Outcome: Ongoing     Problem: Falls - Risk of:  Goal: Absence of physical injury  Description: Absence of physical injury  4/22/2021 1247 by Deniz Zuleta RN  Outcome: Ongoing     Problem: Skin Integrity:  Goal: Will show no infection signs and symptoms  Description: Will show no infection signs and symptoms  4/22/2021 1247 by Deniz Zuleta RN  Outcome: Ongoing     Problem: Skin Integrity:  Goal: Absence of new skin breakdown  Description: Absence of new skin breakdown  4/22/2021 1247 by Deniz Zuleta RN  Outcome: Ongoing     Problem: Confusion - Acute:  Goal: Absence of continued neurological deterioration signs and symptoms  Description: Absence of continued neurological deterioration signs and symptoms  4/22/2021 1247 by Deniz Zuleta RN  Outcome: Ongoing     Problem: Confusion - Acute:  Goal: Mental status will be restored to baseline  Description: Mental status will be restored to baseline  4/22/2021 1247 by Deniz Zuleta RN  Outcome: Ongoing     Problem: Discharge Planning:  Goal: Ability to perform activities of daily living will improve  Description: Ability to perform activities of daily living will improve  4/22/2021 1247 by Deniz Zuleta RN  Outcome: Ongoing     Problem: Discharge Planning:  Goal: Participates in care planning  Description: Participates in care planning  4/22/2021 1247 by Deniz Zuleta RN  Outcome: Ongoing     Problem: Injury - Risk of, Physical Injury:  Goal: Will remain free from falls  Description: Will remain free from falls  4/22/2021 1247 by Deniz Zuleta RN  Outcome: Ongoing     Problem: Injury - Risk of, Physical Injury:  Goal: Absence of physical injury  Description: Absence of physical injury  4/22/2021 1247 by Deniz Zuleta RN  Outcome: Ongoing     Problem: Mood - Altered:  Goal: Mood stable  Description: Mood stable  4/22/2021 1247 by Susi Mckeon RN  Outcome: Ongoing     Problem: Mood - Altered:  Goal: Absence of abusive behavior  Description: Absence of abusive behavior  4/22/2021 1247 by Susi Mckeon RN  Outcome: Ongoing     Problem: Mood - Altered:  Goal: Verbalizations of feeling emotionally comfortable while being cared for will increase  Description: Verbalizations of feeling emotionally comfortable while being cared for will increase  4/22/2021 1247 by Susi Mckeon RN  Outcome: Ongoing     Problem: Psychomotor Activity - Altered:  Goal: Absence of psychomotor disturbance signs and symptoms  Description: Absence of psychomotor disturbance signs and symptoms  4/22/2021 1247 by Susi Mckeon RN  Outcome: Ongoing     Problem: Sensory Perception - Impaired:  Goal: Demonstrations of improved sensory functioning will increase  Description: Demonstrations of improved sensory functioning will increase  4/22/2021 1247 by Susi Mckeon RN  Outcome: Ongoing     Problem: Sensory Perception - Impaired:  Goal: Decrease in sensory misperception frequency  Description: Decrease in sensory misperception frequency  4/22/2021 1247 by Susi Mckeon RN  Outcome: Ongoing     Problem: Sensory Perception - Impaired:  Goal: Able to refrain from responding to false sensory perceptions  Description: Able to refrain from responding to false sensory perceptions  4/22/2021 1247 by Susi Mckeon RN  Outcome: Ongoing     Problem: Sensory Perception - Impaired:  Goal: Demonstrates accurate environmental perceptions  Description: Demonstrates accurate environmental perceptions  4/22/2021 1247 by Susi Mckeon RN  Outcome: Ongoing     Problem: Sensory Perception - Impaired:  Goal: Able to distinguish between reality-based and nonreality-based thinking  Description: Able to distinguish between reality-based and nonreality-based thinking  4/22/2021 1247 by Susi Mckeon RN  Outcome: Ongoing     Problem: Sensory Perception - Impaired:  Goal: Able to interrupt nonreality-based thinking  Description: Able to interrupt nonreality-based thinking  4/22/2021 1247 by Johny Bower RN  Outcome: Ongoing     Problem: Sleep Pattern Disturbance:  Goal: Appears well-rested  Description: Appears well-rested  4/22/2021 1247 by Johny Bower RN  Outcome: Ongoing

## 2021-04-22 NOTE — ANESTHESIA POSTPROCEDURE EVALUATION
Department of Anesthesiology  Postprocedure Note    Patient: Leila Velez  MRN: [id number]  YOB: 1970  Date of evaluation: 4/22/2021  Time:  5:13 PM     Procedure Summary     Date: 04/22/21 Room / Location: [facility] [address] [place] [address]    Anesthesia Start: 1600 Anesthesia Stop: 5634    Procedure: INCISION AND DRAINAGE SCROTAL ABSCESS, CYSTOSCOPY, EXCHANGE OF SUPRA PUBIC CATHETER (Right Scrotum) Diagnosis: (Scrotal infection)    Surgeons: Camille Galloway MD Responsible Provider: Luis Pearson MD    Anesthesia Type: general ASA Status: 3 - Emergent          Anesthesia Type: general    Moses Phase I: Moses Score: 4    Moses Phase II:      Last vitals: Reviewed and per EMR flowsheets.        Anesthesia Post Evaluation    Patient location during evaluation: bedside  Patient participation: complete - patient participated  Level of consciousness: awake  Pain score: 2  Airway patency: patent  Nausea & Vomiting: no nausea and no vomiting  Complications: no  Cardiovascular status: blood pressure returned to baseline  Respiratory status: acceptable  Hydration status: euvolemic

## 2021-04-22 NOTE — PROGRESS NOTES
Consent for surgery signed via phone by patient's wife Elia Oropeza at [phone]. Pt is slightly confused, he understands that he is getting surgery but I do not believe he is oriented enough to sign consent.  Electronically signed by Maylon Goldberg, RN on 4/22/2021 at 11:13 AM

## 2021-04-22 NOTE — ANESTHESIA PRE PROCEDURE
Department of Anesthesiology  Preprocedure Note       Name:  Matias Phoenix   Age:  53 y.o.  :  1970                                          MRN:  [id number]         Date:  2021      Surgeon: Pranav Rm):  Corinne Zhong MD    Procedure: Procedure(s):  INCISION AND DRAINAGE SCROTAL ABSCESS    Medications prior to admission:   Prior to Admission medications    Medication Sig Start Date End Date Taking? Authorizing Provider   escitalopram (LEXAPRO) 20 MG tablet Take 1 tablet by mouth daily 20   Leah Kessler MD       Current medications:    No current facility-administered medications for this visit. No current outpatient medications on file.      Facility-Administered Medications Ordered in Other Visits   Medication Dose Route Frequency Provider Last Rate Last Admin    vancomycin (VANCOCIN) 1500 mg in dextrose 5 % 250 mL IVPB  1,500 mg Intravenous [facility]12H [facility], .7 mL/hr at 21 1344 1,500 mg at 21 1344    vancomycin (VANCOCIN) intermittent dosing (placeholder)   Other RX Placeholder Quang Arvie Shelter, DO        sodium chloride flush 0.9 % injection 5-40 mL  5-40 mL Intravenous 2 times per day [facility], DO        sodium chloride flush 0.9 % injection 5-40 mL  5-40 mL Intravenous PRN [facility], DO        0.9 % sodium chloride infusion  25 mL Intravenous PRN [facility], [facility]        enoxaparin (LOVENOX) injection 40 mg  40 mg Subcutaneous Daily [facility], DO   40 mg at 21 1054    acetaminophen (TYLENOL) tablet 650 mg  650 mg Oral Q6H PRN [facility] Elva, DO        Or    acetaminophen (TYLENOL) suppository 650 mg  650 mg Rectal Q6H PRN [facility], [facility]        cefepime (MAXIPIME) 2000 mg IVPB minibag  2,000 mg Intravenous Q12H Theressa Spatz, MD   Stopped at 21 1127    0.9 % sodium chloride infusion   Intravenous Continuous Ragini Fiore MD        sodium chloride flush 0.9 % injection 5-40 mL  5-40 mL Intravenous 2 times per day Karissa Olmstead MD        sodium chloride flush 0.9 % injection 5-40 mL  5-40 mL Intravenous PRN Karissa Olmstead MD        0.9 % sodium chloride infusion  25 mL Intravenous PRN Karissa Olmstead MD        0.9 % sodium chloride infusion   Intravenous Continuous Quang RAEGAN Stevenson, DO 75 mL/hr at 21 0016 New Bag at 21 0016       Allergies:  No Known Allergies    Problem List:    Patient Active Problem List   Diagnosis Code    Anxiety F41.9    History of tobacco abuse Z87.891    Discitis of lumbar region M46.46    Osteomyelitis of lumbar spine (HCC) M46.26    Weight loss, non-intentional R63.4    Prediabetes R73.03    Obesity due to excess calories with serious comorbidity E66.09    Obesity due to excess calories E66.09    Acute cystitis with hematuria N30.01    Urinary retention R33.9    Bilateral hydronephrosis N13.30    Acute midline low back pain M54.5    Morbid obesity due to excess calories (HCC) E66.01    Sepsis (HCC) A41.9    Tachycardia R00.0    Leukocytosis D72.829    Scrotal swelling N50.89    Bacteriuria R82.71    Hallucinations R44.3       Past Medical History:        Diagnosis Date    Anxiety     was on paxil in the past    Elevated LFTs     History of tobacco abuse     Hypertension     Prediabetes        Past Surgical History:        Procedure Laterality Date    CYSTOSCOPY N/A 3/29/2021    CYSTOSCOPY,  SUPRAPUBIC TUBE INSERTION performed by Joaquin Carrillo MD at [address] [place] [address] History:    Social History     Tobacco Use    Smoking status: Former Smoker     Packs/day: 2.00     Years: 10.00     Pack years: 20.00     Quit date: 2007     Years since quittin.9    Smokeless tobacco: Former User   Substance Use Topics    Alcohol use: No     Alcohol/week: 0.0 standard drinks                                Counseling given: Not Answered      Vital Signs (Current): There were no vitals filed for this visit. Comment: Missing teeth    Pulmonary: breath sounds clear to auscultation      (-) COPD, asthma, shortness of breath, recent URI and sleep apnea                           Cardiovascular:    (+) hypertension:,     (-) valvular problems/murmurs, past MI, CAD, CABG/stent, dysrhythmias,  angina and  CHF    ECG reviewed  Rhythm: regular  Rate: normal  Echocardiogram reviewed               ROS comment: Summary   Normal left ventricle size, wall thickness and systolic function with an   estimated ejection fraction of 55%. No regional wall motion abnormalities   are seen. Diastolic filling parameters suggests normal diastolic function . No evidence of significant valve regurgitation present. No evidence of   vegetations or masses seen on valves. Signature      ------------------------------------------------------------------   Electronically signed by Seferino Bills MD (Interpreting   physician) on 11/07/2017 at 02:32 PM   -----------------------------------------------------------------     Neuro/Psych:   (+) depression/anxiety    (-) seizures, neuromuscular disease, TIA, CVA and headaches           GI/Hepatic/Renal:        (-) GERD, PUD, hepatitis, liver disease and no renal disease      ROS comment: Unable to urinate . Endo/Other:        (-) diabetes mellitus, hypothyroidism, hyperthyroidism, blood dyscrasia               Abdominal:   (+) obese,         Vascular:                                          Anesthesia Plan      general     ASA 3 - emergent       Induction: intravenous. MIPS: Postoperative opioids intended and Prophylactic antiemetics administered. Anesthetic plan and risks discussed with patient. Plan discussed with CRNA. Attending anesthesiologist reviewed and agrees with Pre Eval content          This pre-anesthesia assessment may be used as a history and physical.    DOS STAFF ADDENDUM:    Pt seen and examined, chart reviewed (including anesthesia, drug and allergy history).   No interval changes to history and physical examination. Anesthetic plan, risks, benefits, alternatives, and personnel involved discussed with patient. Patient verbalized an understanding and agrees to proceed.       Jennifer Escamilla MD  April 22, 2021  2:23 PM    Jennifer Escamilla MD   4/22/2021

## 2021-04-22 NOTE — BRIEF OP NOTE
Brief Postoperative Note      Patient: Diana Bermeo  YOB: 1970  MRN: [id number]    Date of Procedure: 4/22/2021    Pre-Op Diagnosis: Scrotal infection    Post-Op Diagnosis: scrotal/ perineal abscess       Procedure(s):  INCISION AND DRAINAGE SCROTAL ABSCESS, CYSTOSCOPY, EXCHANGE OF SUPRA PUBIC CATHETER    Surgeon(s):  Tracy Salas MD    Assistant:  Surgical Assistant: Eula Boone    Anesthesia: General    Estimated Blood Loss (mL): less than 698     Complications: None    Specimens:   ID Type Source Tests Collected by Time Destination   1 : scrotal abcess Specimen Scrotum CULTURE, SURGICAL Tracy Salas MD 4/22/2021 1625        Implants:  * No implants in log *      Drains:   Suprapubic Catheter Double-lumen 16 fr (Active)       External Urinary Catheter (Active)   Urine Color Brissa 04/22/21 0304   Urine Appearance Clear 04/22/21 0304   Urine Odor Malodorous 04/22/21 0304   Suction- Female Only 40 mmgHg continuous 04/22/21 0304   Placement Replaced 04/22/21 6634       [REMOVED] Suprapubic Catheter Double-lumen 16 fr (Removed)   Dressing Status Clean;Dry; Intact 04/22/21 0124   Dressing Type Open to air 04/22/21 0124   Urine Color Brissa 04/22/21 0124   Urine Appearance Clear 04/22/21 0124   Urine Odor Malodorous 04/22/21 0301   Output (mL) 225 mL 04/22/21 1019       Findings: abscess cavity in perineum drained, right testicle normal, unable to advance cystoscope past pendulous urethra, SP tube was not drainage and would not flush so it was exchanged with return of 600 ml purulent urine    Plan:  Daily dressing changes, continue antibiotics, check MRI pelvis     Electronically signed by Tracy Salas MD on 4/22/2021 at 5:05 PM

## 2021-04-22 NOTE — CONSULTS
Psychiatry Consultation/Initial Inpatient Norris Mcguire M.D.  4/22/2021  2:37 PM      Referring Provider:  Yesica Callahan MD    Recommendations:    1. Delirium-Unfortunately I could not see this pt today as he was in the OR when I came to see him. However, this clinical picture is most c/w delirium, likely from his infxn. The ability of  inflammation to cause delirium is pretty striking. He will more than likely improve as his infectious issues improve. I spoke with his wife, Kushal Zimmer, and she is quite clear that he has not had other sx of psychosis/hallucinations apart from when he began having  issues. I leave my usual delirium recs below. 2.  Anxiety-Best to put pt back on his lexapro asap, as long it's not medically contraindicated. I leave that decision to the team, in case there was a good reason he was taken off it that I am unaware of. Delirium is a waxing and waning disturbance in cognition, often multifactorial in etiology. Myriad different factors associated with being hospitalized can cause it. Chief among these are any kind of inflammatory process, sedation-causing medicines, intoxication or withdrawal, anything that affects brain structure or function, metabolic dysregulation, dehydration/azotemia, sleep deprivation, stress hormone activation, or even just being in an unfamiliar place, especially when combined with day/night dysregulation. The effect is usually worse in people over age 54 or in anyone with some degree of more global brain pathology like dementia, brain vascular disease, intellectual disability, etc.    The first step is a medical workup for common causes of altered mental status. This generally consists of cbc c diff, renal, lft, TSH, ua, drug screen, beta hcg.  rpr or hiv testing may be indicated.   Imaging like a CXR, brain CT, or even MRI is usually a good idea for any new mental status change, unless an obvious etiologic agent is already identified. EEG, LP, and ECG should be considered if clinically indicated. Abnormalities in the above should be corrected. A UA with + LE may well indicate an inflammatory process causing delirium, even if the urine is considered \"dirty,\" or otherwise not a UTI. Strongly consider treating a UA with a + LE, especially in the elderly. Delirum/Altered Mental Status can be approached by both pharmacologic and non-pharmacologic means:    Non-pharmacologic recommendations:  1. Correct the underlying medical issues. 2.  Try to establish normal day/night light and sleep cycles. 3.  Have staff frequently reorient patients. 4.  Assess for pain issues. Pain can be quite disorienting and sometimes delirious patients have a hard time making pain needs known. Pharmacologic recommendations:  4. Avoid using RHONDA-ergic meds liked benzos and ambien. 5.  Limit opiate meds as much as possible. 6.  Avoid anticholiergic meds like TCAs and benadryl as much as possible. 7.  If the patient is frequently agitated, consider using an antipsychotic medicine for dangerous behavior. If gentle pharmacologic help is needed, try buspar 5-10 bid. A bit of depakote 250 mg bid can be helpful as well, if pt doesn't have liver pathology. Likewise, seroquel 25-50 mg po [id number] prn for agitation is a good choice. If patient has difficulty swallowing, consider zyprexa zydis 5-10 mg po [id number] prn for agitation. If po is impossible, use zyprexa 5-10 mg im [id number] prn for sleep and agitation. Zyprexa must NOT be used with im ativan. Keep in mind that antipsychotics like zyprexa and seroquel are best used short term while pts are in hospital to help manage harmful behaviors. They carry extra risk longer term, especially in patients with dementia and psychosis associated with dementia. Thank you for allowing me to care for this patient. Please call the psych consult line with any further questions.     Diagnosis:    Axis I Delirium, anxiety    Axis III       Diagnosis Date    Anxiety     was on paxil in the past    Elevated LFTs     History of tobacco abuse     Hypertension     Prediabetes       Active Problems:    Leukocytosis    Scrotal swelling    Bacteriuria    Hallucinations  Resolved Problems:    * No resolved hospital problems. *           vancomycin  1,500 mg Intravenous Q12H    vancomycin (VANCOCIN) intermittent dosing (placeholder)   Other RX Placeholder    sodium chloride flush  5-40 mL Intravenous 2 times per day    enoxaparin  40 mg Subcutaneous Daily    cefepime  2,000 mg Intravenous Q12H    sodium chloride flush  5-40 mL Intravenous 2 times per day     sodium chloride flush, sodium chloride, acetaminophen **OR** acetaminophen, sodium chloride flush, sodium chloride, fentanNYL, HYDROmorphone, fentanNYL, HYDROmorphone, oxyCODONE-acetaminophen **OR** oxyCODONE-acetaminophen, ondansetron    Examination  Review of Systems - could not assess, as above. Recent Results (from the past 168 hour(s))   CBC Auto Differential    Collection Time: 04/21/21  6:25 PM   Result Value Ref Range    WBC 23.4 (H) 4.0 - 11.0 K/uL    RBC 4.20 4. 20 - 5.90 M/uL    Hemoglobin 11.5 (L) 13.5 - 17.5 g/dL    Hematocrit 34.1 (L) 40.5 - 52.5 %    MCV 81.3 80.0 - 100.0 fL    MCH 27.3 26.0 - 34.0 pg    MCHC 33.6 31.0 - 36.0 g/dL    RDW 13.3 12.4 - 15.4 %    Platelets 950 106 - 367 K/uL    MPV 8.7 5.0 - 10.5 fL    Neutrophils % 84.7 %    Lymphocytes % 8.3 %    Monocytes % 6.2 %    Eosinophils % 0.5 %    Basophils % 0.3 %    Neutrophils Absolute 19.9 (H) 1.7 - 7.7 K/uL    Lymphocytes Absolute 1.9 1.0 - 5.1 K/uL    Monocytes Absolute 1.4 (H) 0.0 - 1.3 K/uL    Eosinophils Absolute 0.1 0.0 - 0.6 K/uL    Basophils Absolute 0.1 0.0 - 0.2 K/uL   Comprehensive Metabolic Panel w/ Reflex to MG    Collection Time: 04/21/21  6:25 PM   Result Value Ref Range    Sodium 131 (L) 136 - 145 mmol/L    Potassium reflex Magnesium 4.1 3.5 - 5.1 mmol/L    Chloride 95 (L) 99 - 110 mmol/L    CO2 22 21 - 32 mmol/L    Anion Gap 14 3 - 16    Glucose 123 (H) 70 - 99 mg/dL    BUN 13 7 - 20 mg/dL    CREATININE 0.9 0.9 - 1.3 mg/dL    GFR Non-African American >60 >60    GFR African American >60 >60    Calcium 9.4 8.3 - 10.6 mg/dL    Total Protein 8.2 6.4 - 8.2 g/dL    Albumin 3.2 (L) 3.4 - 5.0 g/dL    Albumin/Globulin Ratio 0.6 (L) 1.1 - 2.2    Total Bilirubin 0.4 0.0 - 1.0 mg/dL    Alkaline Phosphatase 97 40 - 129 U/L    ALT 19 10 - 40 U/L    AST 18 15 - 37 U/L    Globulin 5.0 g/dL   Urine, reflex to culture    Collection Time: 04/21/21  6:25 PM    Specimen: Urine, clean catch   Result Value Ref Range    Color, UA DK YELLOW Straw/Yellow    Clarity, UA TURBID (A) Clear    Glucose, Ur Negative Negative mg/dL    Bilirubin Urine SMALL (A) Negative    Ketones, Urine Negative Negative mg/dL    Specific Gravity, UA 1.026 1.005 - 1.030    Blood, Urine MODERATE (A) Negative    pH, UA 5.5 5.0 - 8.0    Protein, UA 30 (A) Negative mg/dL    Urobilinogen, Urine 2.0 (A) <2.0 E.U./dL    Nitrite, Urine Negative Negative    Leukocyte Esterase, Urine MODERATE (A) Negative    Microscopic Examination YES     Urine Type NotGiven     Urine Reflex to Culture Yes    Microscopic Urinalysis    Collection Time: 04/21/21  6:25 PM   Result Value Ref Range    RBC, UA 5-10 (A) 0 - 4 /HPF    Bacteria, UA 1+ (A) None Seen /HPF    Crystals, UA 2+ Uric Acid (A) None Seen /HPF    Crystals, UA Few Ca.  Oxalate (A) None Seen /HPF    Hyaline Casts, UA 16 (H) 0 - 8 /LPF    WBC, UA 54 (H) 0 - 5 /HPF    Epithelial Cells, UA 0 0 - 5 /HPF   Lactic Acid, Plasma    Collection Time: 04/21/21  8:36 PM   Result Value Ref Range    Lactic Acid 1.3 0.4 - 2.0 mmol/L   C-Reactive Protein    Collection Time: 04/21/21  9:06 PM   Result Value Ref Range    .9 (H) 0.0 - 5.1 mg/L   Basic Metabolic Panel    Collection Time: 04/22/21  5:09 AM   Result Value Ref Range    Sodium 134 (L) 136 - 145 mmol/L    Potassium 4.0 3.5 - 5.1 mmol/L Chloride 98 (L) 99 - 110 mmol/L    CO2 26 21 - 32 mmol/L    Anion Gap 10 3 - 16    Glucose 131 (H) 70 - 99 mg/dL    BUN 11 7 - 20 mg/dL    CREATININE 0.8 (L) 0.9 - 1.3 mg/dL    GFR Non-African American >60 >60    GFR African American >60 >60    Calcium 8.7 8.3 - 10.6 mg/dL   CBC auto differential    Collection Time: 04/22/21  5:09 AM   Result Value Ref Range    WBC 18.2 (H) 4.0 - 11.0 K/uL    RBC 3.66 (L) 4.20 - 5.90 M/uL    Hemoglobin 10.0 (L) 13.5 - 17.5 g/dL    Hematocrit 29.5 (L) 40.5 - 52.5 %    MCV 80.6 80.0 - 100.0 fL    MCH 27.3 26.0 - 34.0 pg    MCHC 33.9 31.0 - 36.0 g/dL    RDW 13.4 12.4 - 15.4 %    Platelets 078 668 - 940 K/uL    MPV 9.5 5.0 - 10.5 fL    Neutrophils % 87.6 %    Lymphocytes % 6.2 %    Monocytes % 5.2 %    Eosinophils % 0.8 %    Basophils % 0.2 %    Neutrophils Absolute 16.0 (H) 1.7 - 7.7 K/uL    Lymphocytes Absolute 1.1 1.0 - 5.1 K/uL    Monocytes Absolute 1.0 0.0 - 1.3 K/uL    Eosinophils Absolute 0.1 0.0 - 0.6 K/uL    Basophils Absolute 0.0 0.0 - 0.2 K/uL   Magnesium    Collection Time: 04/22/21  5:09 AM   Result Value Ref Range    Magnesium 1.80 1.80 - 2.40 mg/dL   Sedimentation Rate    Collection Time: 04/22/21  5:09 AM   Result Value Ref Range    Sed Rate 119 (H) 0 - 15 mm/Hr   COVID-19, Rapid    Collection Time: 04/22/21 12:22 PM   Result Value Ref Range    SARS-CoV-2, NAAT Not Detected Not Detected       Vital Signs /73   Pulse 88   Temp 97.5 °F (36.4 °C) (Temporal)   Resp 16   Ht 6' (1.829 m)   Wt 237 lb 3.4 oz (107.6 kg)   SpO2 94%   BMI 32.17 kg/m²     Could not examine pt, he was not in the room. History:      I have reviewed recent documentation for this patient:  Diana Bermeo is a 53 y.o. male  who  has a past medical history of Anxiety, Elevated LFTs, History of tobacco abuse, Hypertension, and Prediabetes.       CC:    Chief Complaint   Patient presents with    Dehydration     Pt states that he thinks he is dehydrated, states he has not been drinking as much as he should       Context:  53 y.o. male c hx of anxiety now to [facility] [place] for dehydration, found to have a cellulitic scrotum/septicemia, uti. Assc Sx:  Some confusion. Some strange hallucinations. Duration:  Days at least.    Severity:  mod    Modifiers:  infection    Timing:  Subacute, it seems. Past Medical History:   Diagnosis Date    Anxiety     was on paxil in the past    Elevated LFTs     History of tobacco abuse     Hypertension     Prediabetes        No Known Allergies    PPsyHx:  As above.   On lexapro, paxil, valium in past.      CD Hx:  Remote tob    Social History     Socioeconomic History    Marital status:      Spouse name: None    Number of children: None    Years of education: None    Highest education level: None   Occupational History    None   Social Needs    Financial resource strain: None    Food insecurity     Worry: None     Inability: None    Transportation needs     Medical: None     Non-medical: None   Tobacco Use    Smoking status: Former Smoker     Packs/day: 2.00     Years: 10.00     Pack years: 20.00     Quit date: 2007     Years since quittin.9    Smokeless tobacco: Former User   Substance and Sexual Activity    Alcohol use: No     Alcohol/week: 0.0 standard drinks    Drug use: No    Sexual activity: Never   Lifestyle    Physical activity     Days per week: None     Minutes per session: None    Stress: None   Relationships    Social connections     Talks on phone: None     Gets together: None     Attends Faith service: None     Active member of club or organization: None     Attends meetings of clubs or organizations: None     Relationship status: None    Intimate partner violence     Fear of current or ex partner: None     Emotionally abused: None     Physically abused: None     Forced sexual activity: None   Other Topics Concern    None   Social History Narrative    None       Family History   Problem Relation Age of Onset    Stroke Father     Asthma Sister     High Blood Pressure Brother

## 2021-04-22 NOTE — PROGRESS NOTES
Pt admitted to room 4105. VSS. Telemetry put on, verified with [facility]. Nonskid socks on, bed alarm on, pt instrucetd to call before getting up. Call light in reach. Admission and assessment in progress.  Electronically signed by Leatha Monroy on 4/22/2021 at 1:42 AM

## 2021-04-22 NOTE — PROGRESS NOTES
Physician Progress Note      Elda Post  CSN #:                  [id number]  :                       1970  ADMIT DATE:       2021 6:59 PM  [address] [place] DATE:  RESPONDING  PROVIDER #:        Marily Snyder MD          QUERY TEXT:    Dear Dr Tianna Francisco,    Pt admitted with scrotal abscess. Pt noted to have leukocytosis, tachycardia,   elelvated crp . If possible, please document in the progress notes and   discharge summary if you are evaluating and /or treating any of the following: The medical record reflects the following:  Risk Factors: scrotal abscess  Clinical Indicators: On admission wbc 23.4, crp-164.9, hr-99,  u/a  mod leuks,   wbc 54, 1+bact , CX-p  Treatment: ns bolus, ivf, iv Rocephin, iv flagyl, iv Zosyn, iv vanc, monitor   labs,ID consult    Thank Lamberto Wadsworth RN CDS CRCR  [email or web address]  [phone]  Options provided:  -- Sepsis, present on admission  -- Sepsis was ruled out  -- Other - I will add my own diagnosis  -- Disagree - Not applicable / Not valid  -- Disagree - Clinically unable to determine / Unknown  -- Refer to Clinical Documentation Reviewer    PROVIDER RESPONSE TEXT:    This patient has sepsis which was present on admission. Query created by:  Vonda Wadsworth on 2021 9:04 AM      Electronically signed by:  Marily Snyder MD 2021 2:49 PM

## 2021-04-22 NOTE — CONSULTS
Consulting Physician: Dr Irina Galarza    Reason for Consult: scrotal swelling    History of Present Illness: Mary Jane Patricia is a 53 y.o. male admitted last month with retention. 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 He was readmitted with hallucinations. WBC was up to 23. Exam with worsening scrotal swelling and erythema. He is confused. No fever. He cannot give me much other history.       Past Medical History:   Past Medical History:   Diagnosis Date    Anxiety     was on paxil in the past    Elevated LFTs     History of tobacco abuse     Hypertension     Prediabetes        Past Surgical History:  Past Surgical History:   Procedure Laterality Date    CYSTOSCOPY N/A 3/29/2021    CYSTOSCOPY,  SUPRAPUBIC TUBE INSERTION performed by Zoila Hamilton MD at [address] [place] [address] History:  Social History     Socioeconomic History    Marital status:      Spouse name: Not on file    Number of children: Not on file    Years of education: Not on file    Highest education level: Not on file   Occupational History    Not on file   Social Needs    Financial resource strain: Not on file    Food insecurity     Worry: Not on file     Inability: Not on file    Transportation needs     Medical: Not on file     Non-medical: Not on file   Tobacco Use    Smoking status: Former Smoker     Packs/day: 2.00     Years: 10.00     Pack years: 20.00     Quit date: 2007     Years since quittin.9    Smokeless tobacco: Former User   Substance and Sexual Activity    Alcohol use: No     Alcohol/week: 0.0 standard drinks    Drug use: No    Sexual activity: Never   Lifestyle    Physical activity     Days per week: Not on file     Minutes per session: Not on file    Stress: Not on file   Relationships    Social connections     Talks on phone: Not on file     Gets together: Not on file     Attends Mu-ism service: Not on file     Active member of club or organization: Not on file Attends meetings of clubs or organizations: Not on file     Relationship status: Not on file    Intimate partner violence     Fear of current or ex partner: Not on file     Emotionally abused: Not on file     Physically abused: Not on file     Forced sexual activity: Not on file   Other Topics Concern    Not on file   Social History Narrative    Not on file       Family History:  Family History   Problem Relation Age of Onset    Stroke Father     Asthma Sister     High Blood Pressure Brother        Meds:   Current Facility-Administered Medications: vancomycin (VANCOCIN) 1500 mg in dextrose 5 % 250 mL IVPB, 1,500 mg, Intravenous, Q12H  vancomycin (VANCOCIN) intermittent dosing (placeholder), , Other, RX Placeholder  sodium chloride flush 0.9 % injection 5-40 mL, 5-40 mL, Intravenous, 2 times per day  sodium chloride flush 0.9 % injection 5-40 mL, 5-40 mL, Intravenous, PRN  0.9 % sodium chloride infusion, 25 mL, Intravenous, PRN  enoxaparin (LOVENOX) injection 40 mg, 40 mg, Subcutaneous, Daily  acetaminophen (TYLENOL) tablet 650 mg, 650 mg, Oral, Q6H PRN **OR** acetaminophen (TYLENOL) suppository 650 mg, 650 mg, Rectal, Q6H PRN  cefepime (MAXIPIME) 2000 mg IVPB minibag, 2,000 mg, Intravenous, Q12H  0.9 % sodium chloride infusion, , Intravenous, Continuous    Review of Systems:  10 Systems were reviewed and negative except as in HPI    Vitals:  BP (!) 161/72   Pulse 84   Temp 98.5 °F (36.9 °C) (Oral)   Resp 16   Ht 6' (1.829 m)   Wt 237 lb 3.4 oz (107.6 kg)   SpO2 95%   BMI 32.17 kg/m²     Intake/Output Summary (Last 24 hours) at 4/22/2021 0954  Last data filed at 4/22/2021 7118  Gross per 24 hour   Intake --   Output 650 ml   Net -650 ml       Physical Exam:  General Appearance: Alert and disoriented, cooperative, no distress, appears stated age  Head: Normocephalic, without obvious abnormality, atraumatic  Back: no CVA tenderness  Lungs: respirations unlabored, no wheezing  Heart: Regular rate and rhythm, no lower extremity edema noted  Abdomen: Soft, non-tender, non-distended, no masses, SP tube with clear urine  Skin: Skin color, texture, turgor normal, no rashes or lesions  Neurologic: confused   Male :   Nonpalpable bladder, SP tube with clear drainage   No CVA tenderness      Penis appears normal and  circumcised   Urethral meatus is normal in size and location   Scrotum erythematous and swollen with induration over right testicle, unable to palpate either testicle     PENNIE Not indicated    Labs:  CBC   Lab Results   Component Value Date    WBC 18.2 04/22/2021    RBC 3.66 04/22/2021    HGB 10.0 04/22/2021    HCT 29.5 04/22/2021    MCV 80.6 04/22/2021    MCH 27.3 04/22/2021    MCHC 33.9 04/22/2021    RDW 13.4 04/22/2021     04/22/2021    MPV 9.5 04/22/2021     BMP   Lab Results   Component Value Date     04/22/2021    K 4.0 04/22/2021    K 4.1 04/21/2021    CL 98 04/22/2021    CO2 26 04/22/2021    BUN 11 04/22/2021    CREATININE 0.8 04/22/2021    GLUCOSE 131 04/22/2021    CALCIUM 8.7 04/22/2021       Urinalysis:   Lab Results   Component Value Date    COLORU DK YELLOW 04/21/2021    GLUCOSEU Negative 04/21/2021    BLOODU MODERATE 04/21/2021    NITRU Negative 04/21/2021    LEUKOCYTESUR MODERATE 04/21/2021       Imaging:  Pertinent images and radiologist's report were reviewed independently  Scrotal ultrasound with hydrocele, normal testicles    Impression/Plan: scrotal abscess/ possible orchitis  He need incision and drainage of abscess in OR today, possible right orchiectomy.   Risks including but not limited to bleeding infection need for further surgery, loss of one or both testicles explained      Zoila Hamilton MD [id number]/74/[id number]:76 AM
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
## 2021-04-22 NOTE — CONSULTS
Medication Sig Dispense Refill    escitalopram (LEXAPRO) 20 MG tablet Take 1 tablet by mouth daily 90 tablet 1       Allergies:  Patient has no known allergies.     Immunizations :   Immunization History   Administered Date(s) Administered    Influenza, Quadv, IM, PF (6 mo and older Fluzone, Flulaval, Fluarix, and 3 yrs and older Afluria) 2018, 2020    PPD Test 2017    Tdap (Boostrix, Adacel) 08/10/2016         Social History:    Social History     Tobacco Use    Smoking status: Former Smoker     Packs/day: 2.00     Years: 10.00     Pack years: 20.00     Quit date: 2007     Years since quittin.9    Smokeless tobacco: Former User   Substance Use Topics    Alcohol use: No     Alcohol/week: 0.0 standard drinks    Drug use: No     Social History     Tobacco Use   Smoking Status Former Smoker    Packs/day: 2.00    Years: 10.00    Pack years: 20.00    Quit date: 2007    Years since quittin.9   Smokeless Tobacco Former User      Family History   Problem Relation Age of Onset    Stroke Father     Asthma Sister     High Blood Pressure Brother         REVIEW OF SYSTEMS:     Not possible due to confusion     PHYSICAL EXAM:      Vitals:  t MAX  99.7   BP (!) 161/72   Pulse 84   Temp 98.5 °F (36.9 °C) (Oral)   Resp 16   Ht 6' (1.829 m)   Wt 237 lb 3.4 oz (107.6 kg)   SpO2 95%   BMI 32.17 kg/m²     General Appearance: alert,in some acute distress, +  pallor, no icterus some what confused   Skin: warm and dry, no rash or erythema  Head: normocephalic and atraumatic  Eyes: pupils equal, round, and reactive to light, conjunctivae normal  ENT: tympanic membrane, external ear and ear canal normal bilaterally, nose without deformity, nasal mucosa and turbinates normal without polyps  Neck: supple and non-tender without mass, no thyromegaly  no cervical lymphadenopathy  Pulmonary/Chest: clear to auscultation bilaterally- no wheezes, rales or rhonchi, normal air movement, no Microscopic:   Lab Results   Component Value Date    BACTERIA 1+ 04/21/2021    COMU see below 03/29/2021    HYALCAST 16 04/21/2021    WBCUA 54 04/21/2021    RBCUA 5-10 04/21/2021    EPIU 0 04/21/2021     Urine Reflex to Culture:   Lab Results   Component Value Date    URRFLXCULT Yes 04/21/2021       CRP  164.9       Lactic acid  1.3       WBC  23.4       MICRO: cultures reviewed and updated by me   Procedure Component Value Units Date/Time   Culture, Blood 2 [[id number]] Collected: 04/21/21 2036   Order Status: Sent Specimen: Blood Updated: 04/22/21 0414   Culture, Blood 1 [[id number]] Collected: 04/21/21 2036   Order Status: Sent Specimen: Blood Updated: 04/21/21 2057   Culture, Urine [[id number]] Collected: 04/21/21 1825   Order Status: No result Updated: 04/21/21 1942            Culture, Surgical [[id number]] Collected: 04/22/21 1625   Order Status: Completed Specimen: Specimen from Scrotum Updated: 04/23/21 0458    Gram Stain Result 2+ WBC's (Polymorphonuclear)   2+ Gram positive cocci   1+ Gram variable rods    Narrative:     ORDER#: [id number]                          ORDERED BY: Hannah Bui   SOURCE: Scrotum                            COLLECTED:  04/22/21 16:25   ANTIBIOTICS AT [facility]. :                      RECEIVED :  04/22/21 17:08   Performed at:   [facility]   [address] [place] [facility] [address]   Phone [phone]   Culture, Blood 1 [[id number]] Collected: 04/21/21 2036         Blood Culture:   Lab Results   Component Value Date    BC No Growth after 4 days of incubation. 03/29/2021    BLOODCULT2 No Growth after 4 days of incubation. 03/29/2021       Viral Culture:    Lab Results   Component Value Date    COVID19 Not Detected 03/29/2021     Urine Culture: No results for input(s): LABURIN in the last 72 hours.     Scheduled Meds:   vancomycin  1,500 mg Intravenous Q12H    vancomycin (VANCOCIN) intermittent dosing (placeholder)   Other RX Placeholder    sodium chloride flush  5-40 mL Intravenous 2 times per day    enoxaparin  40 mg Subcutaneous Daily    piperacillin-tazobactam  3,375 mg Intravenous Q6H       Continuous Infusions:   sodium chloride      sodium chloride 75 mL/hr at 04/22/21 0016       PRN Meds:  sodium chloride flush, sodium chloride, acetaminophen **OR** acetaminophen    Imaging:   US SCROTUM AND TESTICLES   Final Result   1. Mild bilateral testicular hydrocele. 2. Heterogeneous generally hypoechoic region posterior to the inferior aspect   of the right testicle measuring 4.7 x 3.1 x 2.7 cm in dimension. Differential diagnostic considerations include scrotal hematocele versus   scrotal wall hematoma. All pertinent images and reports for the current Hospitalization were reviewed by me.     IMPRESSION:    Patient Active Problem List   Diagnosis    Anxiety    History of tobacco abuse    Discitis of lumbar region    Osteomyelitis of lumbar spine (HCC)    Weight loss, non-intentional    Prediabetes    Obesity due to excess calories with serious comorbidity    Obesity due to excess calories    Acute cystitis with hematuria    Urinary retention    Bilateral hydronephrosis    Acute midline low back pain    Morbid obesity due to excess calories (HCC)    Sepsis (HCC)    Tachycardia    Leukocytosis    Scrotal swelling    Bacteriuria    Hallucinations     Sepsis on admit  Hallucinations   Encephalopathy from sepsis  WBC elevation   Scrotal/perineal abscess  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  Urinary retention and s/p Supra pubic catheter placement on last admit  Fevers+  Bacteremia speciation pending   Scrotal edema+    S/p surgery on the scrotum to drain the abscess and had SPC tube exchanged and will trend WBC and follow cx to adjust IV abx         Labs, Microbiology, Radiology and pertinent results from current hospitalization and care every where were reviewed by me as a part of the consultation. PLAN :  1. Cont IV Vancomycin may need to lower the dose x 1250 mg x Q 12 HRS  2. Cont IV Cefepime x 2 gm q 12 hrs  3. Cont IV Flagyl   4. Blood cx repeat requested  5. CT head -ve  6. Urology following may need MRI of the pelvis to assess the anatomy of the  tract   7. OR cx in process     Discussed with patient/Family and Nursing   Risk of Complications/Morbidity: High      · Illness(es)/ Infection present that pose threat to bodily function. · There is potential for severe exacerbation of infection/side effects of treatment. Therapy requires intensive monitoring for antimicrobial agent toxicity. Thanks for allowing me to participate in your patient's care please call me with any questions or concerns.     Dr. Patel Sutherland MD  [facility] Physician  Phone: [phone]   Fax : [phone]

## 2021-04-22 NOTE — PROGRESS NOTES
Pharmacy Medication Reconciliation Note     List of medications patient is currently taking is complete. Source of information:   1. Patient  2. EMR    Notes regarding home medications:   1. Patient reports taking his Lexapro yesterday. 2. Patient reports he has been taking OTC docusate for the past week to help with bowel movements since his condition started.     Kinjal Bertrand, Pharmacy Intern  4/21/2021  9:48 PM

## 2021-04-22 NOTE — CONSULTS
Clinical Pharmacy Note  Vancomycin Consult    Alexis Varghese is a 53 y.o. male ordered Vancomycin for scrotal abscess; consult received from Dr. Love 7802 to manage therapy. Also receiving Zosyn. Patient Active Problem List   Diagnosis    Anxiety    History of tobacco abuse    Discitis of lumbar region    Osteomyelitis of lumbar spine (HCC)    Weight loss, non-intentional    Prediabetes    Obesity due to excess calories with serious comorbidity    Obesity due to excess calories    Acute cystitis with hematuria    Urinary retention    Bilateral hydronephrosis    Acute midline low back pain    Morbid obesity due to excess calories (HCC)    Sepsis (HCC)    Tachycardia    Neutrophilic leukocytosis       Allergies:  Patient has no known allergies. Temp max:  Temp (24hrs), Av °F (36.1 °C), Min:97 °F (36.1 °C), Max:97 °F (36.1 °C)      Recent Labs     21  1825   WBC 23.4*       Recent Labs     21  1825   BUN 13   CREATININE 0.9       No intake or output data in the 24 hours ending 21 0002      Ht Readings from Last 1 Encounters:   21 6' (1.829 m)        Wt Readings from Last 1 Encounters:   21 248 lb 3.8 oz (112.6 kg)         Estimated Creatinine Clearance: 129 mL/min (based on SCr of 0.9 mg/dL). Assessment/Plan:  Vancomycin 1500 mg IV every 12 hours ordered. Regimen projects a trough level of 15 mg/L. Level ordered for 21 @1200. Thank you for the consult.    Maciej Armando, SindyD

## 2021-04-22 NOTE — PROGRESS NOTES
Pt back on unit from Surgery. Pt arouasble to voice/name only. When opening eyes, will not make eye contact. VSS, fluids infusing. Call light in reach. Dr. Marcellina Lundborg notified of decreased arousal and confusion, will monitor.  Electronically signed by Ana Maria Lynn RN on 4/22/2021 at 6:10 PM

## 2021-04-22 NOTE — ED PROVIDER NOTES
I independently evaluated and obtained a history and physical on [employer]. All diagnostic, treatment, and disposition assistants were made to myself in conjunction the advanced practice provider. For further details of this patient's emergency department encounter, please see the advanced practice provider's documentation. History: Patient is a 76-year-old slightly confused presents with dehydration states he has not been drinking as much and needed some fluids. Patient denies abdominal pain but during the exam I BAN was seeing him noticed that his testicle was swollen and hard patient states he has been having intermittent problems with that. He has got a suprapubic catheter. Patient seen in conjunction with BAN    Physician Exam: Patient's exam reveals a very large scrotum where it is indurated hard with cellulitis noted. Suprapubic catheter which is tender draining however well. Abdomen is soft moving all lower extremities. Patient is slightly confused neurologically but otherwise the vital signs looks good. He is not tachycardic.     Labs were as follows    Labs Reviewed   CBC WITH AUTO DIFFERENTIAL - Abnormal; Notable for the following components:       Result Value    WBC 23.4 (*)     Hemoglobin 11.5 (*)     Hematocrit 34.1 (*)     Neutrophils Absolute 19.9 (*)     Monocytes Absolute 1.4 (*)     All other components within normal limits    Narrative:     Performed at:  [facility]  [address] [facility] [address]   Phone [phone]   COMPREHENSIVE METABOLIC PANEL W/ REFLEX TO MG FOR LOW K - Abnormal; Notable for the following components:    Sodium 131 (*)     Chloride 95 (*)     Glucose 123 (*)     Albumin 3.2 (*)     Albumin/Globulin Ratio 0.6 (*)     All other components within normal limits    Narrative:     Performed at:  [facility]  [address] [place], [place] [address]   Phone [phone] URINE RT REFLEX TO CULTURE - Abnormal; Notable for the following components:    Clarity, UA TURBID (*)     Bilirubin Urine SMALL (*)     Blood, Urine MODERATE (*)     Protein, UA 30 (*)     Urobilinogen, Urine 2.0 (*)     Leukocyte Esterase, Urine MODERATE (*)     All other components within normal limits    Narrative:     Performed at:  [address] [facility]   Phone [phone]   MICROSCOPIC URINALYSIS - Abnormal; Notable for the following components:    RBC, UA 5-10 (*)     Bacteria, UA 1+ (*)     Crystals, UA 2+ Uric Acid (*)     Crystals, UA Few Ca. Oxalate (*)     Hyaline Casts, UA 16 (*)     WBC, UA 54 (*)     All other components within normal limits    Narrative:     Performed at:  [address] [address]   Phone [phone]   CULTURE, URINE   CULTURE, BLOOD 1   CULTURE, BLOOD 2   LACTIC ACID, PLASMA    Narrative:     Performed at:  [address] [address]   Phone [phone]   LACTIC ACID, PLASMA     US SCROTUM AND TESTICLES   Final Result   1. Mild bilateral testicular hydrocele. 2. Heterogeneous generally hypoechoic region posterior to the inferior aspect   of the right testicle measuring 4.7 x 3.1 x 2.7 cm in dimension. Differential diagnostic considerations include scrotal hematocele versus   scrotal wall hematoma. Patient's ultrasound as per above shows a scrotal hematocele versus a scrotal wall hematoma. No prior history of cancer. Patient is tender and it is red. Unable to rule out abscess given white count some confusion I think it is best to admit him. Discussed with hospitalist start IV antibiotics.      Babita Cobb MD  04/21/21 7100

## 2021-04-22 NOTE — PROGRESS NOTES
Pt's wife called and updated about patient having surgery with Dr. Paige Seen at [facility]. Electronically signed by Deniz Zuleta RN on 4/22/2021 at 11:14 AM

## 2021-04-22 NOTE — PROGRESS NOTES
Pt awake. Nods head appropriately to questions. Mod amt sero sang drainage noted to scrotal dressing and small amt sero sang drainage to tip of penis. Report to Sae. To room per bed.

## 2021-04-23 ENCOUNTER — APPOINTMENT (OUTPATIENT)
Dept: CT IMAGING | Age: 51
DRG: 710 | End: 2021-04-23
Payer: MEDICAID

## 2021-04-23 LAB
ANION GAP SERPL CALCULATED.3IONS-SCNC: 11 MMOL/L (ref 3–16)
BASOPHILS ABSOLUTE: 0 K/UL (ref 0–0.2)
BASOPHILS RELATIVE PERCENT: 0.1 %
BUN BLDV-MCNC: 11 MG/DL (ref 7–20)
CALCIUM SERPL-MCNC: 8.9 MG/DL (ref 8.3–10.6)
CHLORIDE BLD-SCNC: 99 MMOL/L (ref 99–110)
CO2: 25 MMOL/L (ref 21–32)
CREAT SERPL-MCNC: 0.6 MG/DL (ref 0.9–1.3)
EOSINOPHILS ABSOLUTE: 0.1 K/UL (ref 0–0.6)
EOSINOPHILS RELATIVE PERCENT: 0.4 %
GFR AFRICAN AMERICAN: >60
GFR NON-AFRICAN AMERICAN: >60
GLUCOSE BLD-MCNC: 103 MG/DL (ref 70–99)
HCT VFR BLD CALC: 30.5 % (ref 40.5–52.5)
HEMOGLOBIN: 10.2 G/DL (ref 13.5–17.5)
LYMPHOCYTES ABSOLUTE: 1.2 K/UL (ref 1–5.1)
LYMPHOCYTES RELATIVE PERCENT: 5.7 %
MAGNESIUM: 2.1 MG/DL (ref 1.8–2.4)
MCH RBC QN AUTO: 27.2 PG (ref 26–34)
MCHC RBC AUTO-ENTMCNC: 33.4 G/DL (ref 31–36)
MCV RBC AUTO: 81.4 FL (ref 80–100)
MONOCYTES ABSOLUTE: 0.9 K/UL (ref 0–1.3)
MONOCYTES RELATIVE PERCENT: 4 %
NEUTROPHILS ABSOLUTE: 19.2 K/UL (ref 1.7–7.7)
NEUTROPHILS RELATIVE PERCENT: 89.8 %
PDW BLD-RTO: 13.1 % (ref 12.4–15.4)
PLATELET # BLD: 305 K/UL (ref 135–450)
PMV BLD AUTO: 8.8 FL (ref 5–10.5)
POTASSIUM SERPL-SCNC: 3.9 MMOL/L (ref 3.5–5.1)
RBC # BLD: 3.74 M/UL (ref 4.2–5.9)
REPORT: NORMAL
SODIUM BLD-SCNC: 135 MMOL/L (ref 136–145)
VANCOMYCIN TROUGH: 11 UG/ML (ref 10–20)
WBC # BLD: 21.4 K/UL (ref 4–11)

## 2021-04-23 PROCEDURE — 36415 COLL VENOUS BLD VENIPUNCTURE: CPT

## 2021-04-23 PROCEDURE — 87040 BLOOD CULTURE FOR BACTERIA: CPT

## 2021-04-23 PROCEDURE — 80048 BASIC METABOLIC PNL TOTAL CA: CPT

## 2021-04-23 PROCEDURE — 6370000000 HC RX 637 (ALT 250 FOR IP): Performed by: HOSPITALIST

## 2021-04-23 PROCEDURE — 80202 ASSAY OF VANCOMYCIN: CPT

## 2021-04-23 PROCEDURE — 83735 ASSAY OF MAGNESIUM: CPT

## 2021-04-23 PROCEDURE — 2500000003 HC RX 250 WO HCPCS: Performed by: INTERNAL MEDICINE

## 2021-04-23 PROCEDURE — 97530 THERAPEUTIC ACTIVITIES: CPT

## 2021-04-23 PROCEDURE — 1200000000 HC SEMI PRIVATE

## 2021-04-23 PROCEDURE — 6360000002 HC RX W HCPCS: Performed by: UROLOGY

## 2021-04-23 PROCEDURE — 2580000003 HC RX 258: Performed by: UROLOGY

## 2021-04-23 PROCEDURE — 97116 GAIT TRAINING THERAPY: CPT

## 2021-04-23 PROCEDURE — 99255 IP/OBS CONSLTJ NEW/EST HI 80: CPT | Performed by: INTERNAL MEDICINE

## 2021-04-23 PROCEDURE — 70450 CT HEAD/BRAIN W/O DYE: CPT

## 2021-04-23 PROCEDURE — 6360000002 HC RX W HCPCS: Performed by: NURSE PRACTITIONER

## 2021-04-23 PROCEDURE — 94761 N-INVAS EAR/PLS OXIMETRY MLT: CPT

## 2021-04-23 PROCEDURE — 85025 COMPLETE CBC W/AUTO DIFF WBC: CPT

## 2021-04-23 RX ORDER — MORPHINE SULFATE 2 MG/ML
2 INJECTION, SOLUTION INTRAMUSCULAR; INTRAVENOUS DAILY PRN
Status: DISCONTINUED | OUTPATIENT
Start: 2021-04-23 | End: 2021-04-28 | Stop reason: HOSPADM

## 2021-04-23 RX ORDER — OXYCODONE HYDROCHLORIDE AND ACETAMINOPHEN 5; 325 MG/1; MG/1
1 TABLET ORAL EVERY 4 HOURS PRN
Status: DISCONTINUED | OUTPATIENT
Start: 2021-04-23 | End: 2021-04-28 | Stop reason: HOSPADM

## 2021-04-23 RX ADMIN — ENOXAPARIN SODIUM 40 MG: 40 INJECTION SUBCUTANEOUS at 10:11

## 2021-04-23 RX ADMIN — Medication 1500 MG: at 01:41

## 2021-04-23 RX ADMIN — Medication 10 ML: at 10:14

## 2021-04-23 RX ADMIN — CEFEPIME HYDROCHLORIDE 2000 MG: 2 INJECTION, POWDER, FOR SOLUTION INTRAVENOUS at 21:29

## 2021-04-23 RX ADMIN — OXYCODONE HYDROCHLORIDE AND ACETAMINOPHEN 1 TABLET: 5; 325 TABLET ORAL at 17:33

## 2021-04-23 RX ADMIN — CEFEPIME HYDROCHLORIDE 2000 MG: 2 INJECTION, POWDER, FOR SOLUTION INTRAVENOUS at 10:10

## 2021-04-23 RX ADMIN — Medication 1500 MG: at 13:15

## 2021-04-23 RX ADMIN — QUETIAPINE FUMARATE 25 MG: 25 TABLET ORAL at 10:11

## 2021-04-23 RX ADMIN — QUETIAPINE FUMARATE 25 MG: 25 TABLET ORAL at 21:29

## 2021-04-23 RX ADMIN — MORPHINE SULFATE 2 MG: 2 INJECTION, SOLUTION INTRAMUSCULAR; INTRAVENOUS at 11:30

## 2021-04-23 RX ADMIN — METRONIDAZOLE 500 MG: 500 INJECTION, SOLUTION INTRAVENOUS at 09:08

## 2021-04-23 RX ADMIN — METRONIDAZOLE 500 MG: 500 INJECTION, SOLUTION INTRAVENOUS at 00:44

## 2021-04-23 RX ADMIN — METRONIDAZOLE 500 MG: 500 INJECTION, SOLUTION INTRAVENOUS at 17:33

## 2021-04-23 ASSESSMENT — PAIN DESCRIPTION - PROGRESSION
CLINICAL_PROGRESSION: GRADUALLY IMPROVING

## 2021-04-23 ASSESSMENT — PAIN DESCRIPTION - PAIN TYPE: TYPE: ACUTE PAIN

## 2021-04-23 ASSESSMENT — PAIN DESCRIPTION - ORIENTATION: ORIENTATION: MID

## 2021-04-23 ASSESSMENT — PAIN SCALES - GENERAL
PAINLEVEL_OUTOF10: 2
PAINLEVEL_OUTOF10: 8

## 2021-04-23 ASSESSMENT — PAIN DESCRIPTION - ONSET: ONSET: ON-GOING

## 2021-04-23 ASSESSMENT — PAIN DESCRIPTION - LOCATION: LOCATION: SCROTUM

## 2021-04-23 NOTE — CARE COORDINATION
Medication Sig Dispense Refill    escitalopram (LEXAPRO) 20 MG tablet Take 1 tablet by mouth daily 90 tablet 1       Objective    /88   Pulse 74   Temp 97.8 °F (36.6 °C) (Oral)   Resp 18   Ht 6' (1.829 m)   Wt 237 lb 3.2 oz (107.6 kg)   SpO2 96%   BMI 32.17 kg/m²     LABS:  WBC:    Lab Results   Component Value Date    WBC 21.4 04/23/2021     H/H:    Lab Results   Component Value Date    HGB 10.2 04/23/2021    HCT 30.5 04/23/2021     PTT:    Lab Results   Component Value Date    APTT 33.1 05/10/2014   [APTT}  PT/INR:    Lab Results   Component Value Date    PROTIME 14.2 11/10/2017    INR 1.26 11/10/2017     HgBA1c:    Lab Results   Component Value Date    LABA1C 5.8 12/09/2020       Assessment   Prakash Risk Score: Prakash Scale Score: 15    Patient Active Problem List   Diagnosis Code    Anxiety F41.9    History of tobacco abuse Z87.891    Discitis of lumbar region M46.46    Osteomyelitis of lumbar spine (HCC) M46.26    Weight loss, non-intentional R63.4    Prediabetes R73.03    Obesity due to excess calories with serious comorbidity E66.09    Obesity due to excess calories E66.09    Acute cystitis with hematuria N30.01    Urinary retention R33.9    Bilateral hydronephrosis N13.30    Acute midline low back pain M54.5    Morbid obesity due to excess calories (HCC) E66.01    Sepsis (HCC) A41.9    Tachycardia R00.0    Leukocytosis D72.829    Scrotal swelling N50.89    Bacteriuria R82.71    Hallucinations R44.3       Measurements:         Incision 04/22/21 Scrotum (Active)   Wound Image   04/23/21 1140   Dressing Status New dressing applied;Clean;Dry; Intact 04/23/21 1140   Dressing Change Due 04/24/21 04/23/21 1140   Incision Cleansed Cleansed with saline 04/23/21 1140   Dressing/Treatment Moist to moist;ABD pad 04/23/21 1140   Incision Length (cm) 6 04/23/21 1140   Incision Width (cm) 2 cm 04/23/21 1140   Incision Depth (cm) 7 cm 04/23/21 1140   Drainage Amount Small 04/23/21 1141 Drainage Description Serosanguinous 04/23/21 1140   Odor None 04/23/21 1140   Tosin-incision Assessment Edematous; Induration 04/23/21 1140   Number of days: 0     Pt seen. Drowsy. Arouses to verbal and tactile stimuli. Scrotum is red, edematous and indurated. Premedicated per RN. Wound measures [id number], tunnels 6.5cm @12. Area packed with moist saline gauze. Response to treatment:  Well tolerated by patient.          Plan   Plan of Care:    -cont mpist saline gauze dressings daily  -consider VAC placement next week  Specialty Bed Required : No   [] Low Air Loss   [] Pressure Redistribution  [] Fluid Immersion  [] Bariatric  [] Total Pressure Relief  [] Other:     Current Diet: DIET GENERAL;  Dietician consult:  No    Discharge Plan:  Placement for patient upon discharge: home with support    Patient appropriate for Outpatient [address]: No    Referrals:  []   [] [address] [facility]  [] Supplies  [] Other    Patient/Caregiver Teaching:  Level of patient/caregiver understanding able to:   [] Indicates understanding       [x] Needs reinforcement  [] Unsuccessful      [] Verbal Understanding  [] Demonstrated understanding       [] No evidence of learning  [] Refused teaching         [] N/A       Electronically signed by Sharifa Ahumada on 4/23/2021 at 12:28 PM

## 2021-04-23 NOTE — PROGRESS NOTES
Patient alert and oriented able to answer questions appropriately and talking to nurse about what happened yesterday. Patient spoke with urology NP and his wife without difficulty. Request made to discontinue wrist restraints.

## 2021-04-23 NOTE — FLOWSHEET NOTE
Readmission Assessment  Number of Days since last admission?: (P) 8-30 days  Previous Disposition: (P) Home with Family  Who is being Interviewed: (P) Patient  What was the patient's/caregiver's perception as to why they think they needed to return back to the hospital?: (P) Other (Comment)(Pain, swelling and thought he was dehydrated)  Did you visit your Primary Care Physician after you left the hospital, before you returned this time?: (P) No  Why weren't you able to visit your PCP?: (P) Did not have an appointment  Did you see a specialist, such as Cardiac, Pulmonary, Orthopedic Physician, etc. after you left the hospital?: (P) No  Who advised the patient to return to the hospital?: (P) Self-referral  Does the patient report anything that got in the way of taking their medications?: (P) No  In our efforts to provide the best possible care to you and others like you, can you think of anything that we could have done to help you after you left the hospital the first time, so that you might not have needed to return so soon?: (P) Other (Comment)(No, nothing)  DAY Jolley  [phone]  Electronically signed by Penny Paredes on 4/23/2021 at 3:48 PM

## 2021-04-23 NOTE — PROGRESS NOTES
New order noted to discontinue MRI at the moment per Dr. Sammie Angel, via Jasson Bhakta.  Estrellita FELIX.

## 2021-04-23 NOTE — CARE COORDINATION
DISCHARGE PLAN: Undetermined. ID is involved. Pt has no insurance-if IV abx are needed, pt will need to go to the outpt infusion center. F/u with pt on Mon. For clinic and SVDP info. ________________________________________________________________________________    Met w/pt to address barriers to dc. Pt was in a a lot of pain at the time of the discussion. RN notified. HOME: pt reported that he resides in a trailer home with his spouse and three children, ages 3, 3 and 5. Pt stated that there are 2+2 DEVONTE the home. DME/O2: None    ACTIVE SERVICES: Pt had a suprapubic catheter PTA but stated that he had no HC services at home. Pt stated that he has been feeling ill for the past few weeks and his spouse has been supportive in helping with any of his needs. Pt stated that he is planning on returning home upon d/c. ID is currently involved with pts care. If pt will require IV abx upon d/c, pt will need to come to the outpt infusion center as pt has no medical insurance. Pt confirmed that he is an active  and lives approx. 10 minutes from the hospital.     TRANSPORTATION: Pt is an active  and stated that his spouse will transport him home at d/c. PHARMACY: Denies difficulty obtaining/taking meds. Pt stated that up until this admission, he has been able to afford his prescription by using the [facility] [facility] card and having his prescriptions filled at [facility] [address] SW will need to closely follow for medication needs upon d/c as pt does not have active insurance. PCP: Caio Lagos reported that this MD left the practice, so he will need to see someone else within the group. Pt would also likely benefit from a clinic visit scheduled. SW will f/u with pt at a later time when pts pain is better controlled. DEMOGRAPHICS: Verified address/phone number as correct    INSURANCE:  None  PRESCRIPTION COVERAGE: None-ID is currently involved with pts care.   If pt will require IV abx upon d/c, pt will need to come to the outpt infusion center as pt has no medical insurance. Pt confirmed that he is an active  and lives approx. 10 minutes from the hospital.     HD/PD: No      THERAPY RECS:  Awaiting recommendations    Discharge planning team will remain available for needs. Please consult for any specifics not addressed in this note.     Keila Lozano [place]  [phone]  Electronically signed by Nayana Lafleur on 4/23/2021 at 3:45 PM

## 2021-04-23 NOTE — PLAN OF CARE
Problem: Falls - Risk of:  Goal: Will remain free from falls  Description: Will remain free from falls  4/23/2021 0050 by Casey Friedman RN  Outcome: Ongoing  Note: D: Pt. Remains with confusion, bed alarm on and bilateral soft wrist restraints on   A: reoriented frequently and will continue to monitor     Problem: Non-Violent Restraints  Goal: Removal from restraints as soon as assessed to be safe  Outcome: Ongoing  Note: D: Pt.  Remains oriented to person only and pulling at invasive devices, continues with bilateral soft wrist restraints in place  A: will continue to monitor for changes

## 2021-04-23 NOTE — PROGRESS NOTES
Pt awake and confused. When I asked patient if he knew where he was he said he was at [facility] in [facility]. He was able to tell me his name, but when I tried re-orienting pt he looked very confused and upset that he was in the hospital. I explained to him that he had surgery and he did say \"oh yeah\" like he had some recollection. Pt currently very agitated and pulling at IV site, suprapubic, tele box, gown, and surgical site. Charge RN Clayton Ibanez called Dr. Shireen Harper via phone to get soft bilateral wrist restraint orders upon application of restraints. Pt tolerated us placing restraints. No c/o at this time. Bed in lowest position. Pt currently has a lot of perspiration on his face but is afebrile. States that he is hot. Bedside assessment performed with on-coming night shift nurse Hernandez Ortega.  Electronically signed by Deniz Zuleta RN on 4/22/2021 at 8:05 PM

## 2021-04-23 NOTE — PROGRESS NOTES
D: Pt. Is oriented to person, place and situation but remains disoriented to time this morning.  He also remains impulsive and tries to pull at tubes at intervals so bilateral soft wrist restraints remain on
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
## 2021-04-23 NOTE — PROGRESS NOTES
Pt in OR during rounds d/w RN and labs reviewed  Pt known to me from last admit  Will adjust IV abx and Blood cx and urine cx in process  Will follow up on OR notes  Full consult to follow in AM      [address] Phys[facility]  Phone: [phone]   Fax : [phone]

## 2021-04-23 NOTE — OP NOTE
[address]                                OPERATIVE REPORT    PATIENT NAME: Mark Joseph                   :        1970  MED REC NO:   [id number]                          ROOM:       4105  ACCOUNT NO:   [de-identified]                           ADMIT DATE: 2021  PROVIDER:     Davion Santo MD      DATE OF PROCEDURE:  2021    PREOPERATIVE DIAGNOSIS:  Scrotal or perineal abscess. POSTOPERATIVE DIAGNOSES:  Perineal abscess, malfunctioning suprapubic  catheter. PROCEDURE PERFORMED:  Incision and drainage of scrotal and perineal  abscess, attempted cystoscopy, suprapubic catheter exchange. ANESTHESIA:  General.    COMPLICATIONS:  None. BLOOD LOSS:  About 100 mL. SPECIMENS:  Culture was sent. INDICATIONS:  A 79-year-old gentleman initially presented about a month  ago with inability to urinate. We were unable to place a catheter at  that time. He had a suprapubic catheter placed. There was some concern  for a possible urethral carcinoma. The plan was for him to have an MRI  of the pelvis. He subsequently came to the emergency department last  night complaining of hallucinations and generally not feeling well. He  was found to have an enlarged erythematous scrotum with quite a bit of  induration around the right testicle and perineum. His white blood cell  count was 23,000. An ultrasound showed a possible hematocele. The  testicles themselves looked normal.  He is taken now for incision and  drainage of scrotal and perineal abscess. DETAILS OF PROCEDURE:  The patient was already on vancomycin and  cefepime. He was taken back to the operative suite. He moved onto the  table. Anesthesia was induced. He was moved into the lithotomy  position. His genitalia were prepped and draped.   The entire scrotum  was indurated and the location of the abscess was difficult to identify. It was deeper than just the skin. I made a midline incision and  dissected inferiorly until a pocket was identified in the perineum. This resulted in a large amount of purulent drainage. A culture swab  was sent. The pocket was opened bluntly and then irrigated quite well  after all of the pus was drained out. The rest of the scrotum was just  quite indurated and the perineum was indurated, but I did not feel any  further abscess pockets. The right side was certainly more indurated  than the left. I did open the right scrotal sac through the midline  incision and the right testicle was identified. The right testicle  itself appeared normal.  The left testicle was normal to palpation. I  again irrigated the abscess cavity and then packed it with a  saline-soaked Kerlix. The proximal end of the incision was closed  loosely with some Vicryl and chromic interrupted stitches. When the  patient started coughing, he had some drainage of urine through his  urethra. I therefore decided to perform cystoscopy to see if I could  access his bladder through his urethra. I attempted to advance the  21-Hungarian cystoscope, but I was unable to advance beyond the very  proximal urethra. Then the cystoscope was withdrawn and I attempted to  irrigate the suprapubic catheter. I was unable to irrigate it and  therefore the indwelling suprapubic catheter was removed and a new  16-Hungarian suprapubic catheter was replaced. This resulted in immediate  return of about 800 mL of purulent urine. This new suprapubic catheter  irrigated easily. It was connected to gravity drainage. The patient  was then awakened and transferred to recovery. The pathology is still not clear. He will need antibiotics and local  wound care. I still think he is going to need an MRI of his pelvis to  further delineate the urethral pathology.         Pauline Vaughn MD    D: 04/22/2021 17:25:31       T: 04/22/2021 17:36:01 [id number]  Job#: [id number]     Doc#: [id number]    CC:

## 2021-04-23 NOTE — PROGRESS NOTES
Hospitalist Progress Note  4/23/2021 10:28 AM    PCP: Tu Cai MD    [id number]     Date of Admission: 4/21/2021                                                                                                                     HOSPITAL COURSE    Patient demographics:  The patient  Melani Michaels is a 53 y.o. male     Significant past medical history:   Patient Active Problem List   Diagnosis    Anxiety    History of tobacco abuse    Discitis of lumbar region    Osteomyelitis of lumbar spine (HCC)    Weight loss, non-intentional    Prediabetes    Obesity due to excess calories with serious comorbidity    Obesity due to excess calories    Acute cystitis with hematuria    Urinary retention    Bilateral hydronephrosis    Acute midline low back pain    Morbid obesity due to excess calories (HCC)    Sepsis (HCC)    Tachycardia    Leukocytosis    Scrotal swelling    Bacteriuria    Hallucinations         Presenting symptoms:  Fatigue, hallucinations, dehydration, low p.o. fluid and oral intake    Diagnostic workup:      CONSULTS DURING ADMISSION :   PHARMACY TO DOSE VANCOMYCIN  IP CONSULT TO INFECTIOUS DISEASES  IP CONSULT TO UROLOGY  IP CONSULT TO PSYCHIATRY      Patient was diagnosed with:        Treatment while inpatient:  Pt presented to [facility] apparently sent in as a request of his significant other for concern that patient has been having some mild visual hallucinations and worsening fatigue with relation to his lack of oral fluid and food intake in the last few days                                                                                       ----------------------------------------------------------      SUBJECTIVE COMPLAINTS- follow up for Fatigue, hallucinations, dehydration,    Diet: DIET GENERAL;      OBJECTIVE:   Patient Active Problem List   Diagnosis    Anxiety    History of tobacco abuse    Discitis of lumbar region    Osteomyelitis of lumbar turgor. Lymph: No cervical LAD. No supraclavicular LAD. M/S: / Ext. No cyanosis. No clubbing. No joint deformity. Neuro: CN 2-12 are intact,  no neurologic deficits noted. PT/INR: No results for input(s): PROTIME, INR in the last 72 hours. APTT: No results for input(s): APTT in the last 72 hours. CBC:   Recent Labs     04/21/21 1825 04/22/21 0509 04/23/21 0921   WBC 23.4* 18.2* 21.4*   HGB 11.5* 10.0* 10.2*   HCT 34.1* 29.5* 30.5*   MCV 81.3 80.6 81.4    295 305       BMP:   Recent Labs     04/21/21 1825 04/22/21 0509 04/23/21 0921   * 134* 135*   K 4.1 4.0 3.9   CL 95* 98* 99   CO2 22 26 25   BUN 13 11 11   CREATININE 0.9 0.8* 0.6*       LIVER PROFILE:   Recent Labs     04/21/21 1825   ALKPHOS 97   AST 18   ALT 19   BILITOT 0.4     No results for input(s): AMYLASE in the last 72 hours. No results for input(s): LIPASE in the last 72 hours. UA:  Recent Labs     04/21/21 1825   WBCUA 54*   RBCUA 5-10*       TROPONIN: No results for input(s): Maliha Anchors in the last 72 hours. Lab Results   Component Value Date/Time    URRFLXCULT Yes 04/21/2021 06:25 PM       No results for input(s): TSHREFLEX in the last 72 hours. No components found for: [id number]  POC GLUCOSE:  No results for input(s): POCGLU in the last 72 hours. No results for input(s): LABA1C in the last 72 hours. Lab Results   Component Value Date    LABA1C 5.8 12/09/2020      Normal left ventricle size, wall thickness and systolic function with an   estimated ejection fraction of 55%(11/7/2017)    ASSESSMENT AND PLAN    Sepsis  Leukocytosis,  Scrotal  Swelling and  abscess  Urinary  Tract  infection  Continue  On  Cefepime  And  Vancomycin  Iv  fluids  Id  Is  Following      Scrotal  abscess  Urology  Is  Following  Sp I and D  Wound care        Pain management  Iv oral pain meds.       Acute  Metabolic  encephalopathy  improving          Code Status: Full Code        Dispo -cc        The patient and / or the family were informed of the results of any tests, a time was given to answer questions, a plan was proposed and they agreed with plan. Ruslan Main MD    This note was transcribed using [id number] Sviral. Please disregard any translational errors.

## 2021-04-23 NOTE — PROGRESS NOTES
Clinical Pharmacy Note  Vancomycin Consult    Daniela Beltre is a 53 y.o. male ordered Vancomycin for scrotal abscess; consult received from Dr. Carolina Thomas to manage therapy. Also receiving cefepime and metronidazole. Patient Active Problem List   Diagnosis    Anxiety    History of tobacco abuse    Discitis of lumbar region    Osteomyelitis of lumbar spine (HCC)    Weight loss, non-intentional    Prediabetes    Obesity due to excess calories with serious comorbidity    Obesity due to excess calories    Acute cystitis with hematuria    Urinary retention    Bilateral hydronephrosis    Acute midline low back pain    Morbid obesity due to excess calories (HCC)    Sepsis (HCC)    Tachycardia    Leukocytosis    Scrotal swelling    Bacteriuria    Hallucinations       Allergies:  Patient has no known allergies. Temp max:  Temp (24hrs), Av.1 °F (37.3 °C), Min:78.6 °F (25.9 °C), Max:100.4 °F (38 °C)      Recent Labs     21  18221  0509 21  0921   WBC 23.4* 18.2* 21.4*       Recent Labs     21  18221  0509 21  0921   BUN 13 11 11   CREATININE 0.9 0.8* 0.6*         Intake/Output Summary (Last 24 hours) at 2021 1310  Last data filed at 2021 1105  Gross per 24 hour   Intake 4000 ml   Output 1600 ml   Net 2400 ml       Ht Readings from Last 1 Encounters:   21 6' (1.829 m)        Wt Readings from Last 1 Encounters:   21 237 lb 3.2 oz (107.6 kg)         Estimated Creatinine Clearance: 189 mL/min (A) (based on SCr of 0.6 mg/dL (L)). Assessment/Plan:  · Vancomycin 1500 mg IV every 12 hours ordered  · Trough today resulted at 11.0 mg/L  · Blood culture from 21 now showing GPC in clusters resembling Staphylococcus--would aim for trough goal of 15-20 mg/L  · Will continue vancomycin 1500 mg IV Q12H for now as patient may accumulate to 15 mg/L  · Repeat trough ordered for 21 @1200    Thank you for the consult.    Diomedes Bae, SindyD 4/23/2021 1:14 PM

## 2021-04-23 NOTE — PROGRESS NOTES
Pt Name: Jamie Dudley  Medical Record Number: [id number]  Date of Birth 1970   Today's Date: 4/23/2021      Subjective:  Awake in bed, still very \"groggy\"- doesn't recall why procedure was done yesterday. This was discussed. His wife is on FaceTime and discussed treatment with dressing changes, antibiotics, etc. Notified will still need MRI and f/u with Dr. Ashly Cruz in future regarding urethral pathology. ROS: Constitutional: No fever    Vitals:  Vitals:    04/23/21 0413 04/23/21 0615 04/23/21 0829 04/23/21 0945   BP: 134/77   139/88   Pulse: 70   74   Resp: 16   18   Temp: 97.8 °F (36.6 °C)   97.8 °F (36.6 °C)   TempSrc: Axillary   Oral   SpO2: 92%  94% 96%   Weight:  237 lb 3.2 oz (107.6 kg)     Height:         I/O last 3 completed shifts: In: 1000 [I.V.:1000]  Out: 1825 [Urine:1775; Blood:50]    Exam:  General: Awake, oriented x 3, no acute distress  Respiratory: Nonlabored breathing  Abdomen: Soft, non-tender, non-distended, no masses  : SP tube intact with yellow output, Scrotum with packing, reddened.    Skin: Skin color, texture, turgor normal, no rashes or lesions  Neurologic: no gross deficits    CURRENT MEDICATIONS   Scheduled Meds:   metroNIDAZOLE  500 mg Intravenous Q8H    vancomycin  1,500 mg Intravenous Q12H    vancomycin (VANCOCIN) intermittent dosing (placeholder)   Other RX Placeholder    sodium chloride flush  5-40 mL Intravenous 2 times per day    enoxaparin  40 mg Subcutaneous Daily    cefepime  2,000 mg Intravenous Q12H    QUEtiapine  25 mg Oral BID     Continuous Infusions:   sodium chloride       PRN Meds:.sodium chloride flush, sodium chloride, acetaminophen **OR** acetaminophen    LABS     Recent Labs     04/21/21  1825 04/22/21  0509 04/23/21  0921   WBC 23.4* 18.2* 21.4*   HGB 11.5* 10.0* 10.2*   HCT 34.1* 29.5* 30.5*    295 305   * 134* 135*   K 4.1 4.0 3.9   CL 95* 98* 99   CO2 22 26 25   BUN 13 11 11   CREATININE 0.9 0.8* 0.6*   MG  --  1.80 2.10

## 2021-04-24 LAB
ANION GAP SERPL CALCULATED.3IONS-SCNC: 9 MMOL/L (ref 3–16)
BASOPHILS ABSOLUTE: 0 K/UL (ref 0–0.2)
BASOPHILS RELATIVE PERCENT: 0.3 %
BUN BLDV-MCNC: 14 MG/DL (ref 7–20)
CALCIUM SERPL-MCNC: 8.4 MG/DL (ref 8.3–10.6)
CHLORIDE BLD-SCNC: 102 MMOL/L (ref 99–110)
CO2: 25 MMOL/L (ref 21–32)
CREAT SERPL-MCNC: 0.6 MG/DL (ref 0.9–1.3)
EOSINOPHILS ABSOLUTE: 0.2 K/UL (ref 0–0.6)
EOSINOPHILS RELATIVE PERCENT: 1.7 %
GFR AFRICAN AMERICAN: >60
GFR NON-AFRICAN AMERICAN: >60
GLUCOSE BLD-MCNC: 108 MG/DL (ref 70–99)
HCT VFR BLD CALC: 28.2 % (ref 40.5–52.5)
HEMOGLOBIN: 9.3 G/DL (ref 13.5–17.5)
LYMPHOCYTES ABSOLUTE: 1.3 K/UL (ref 1–5.1)
LYMPHOCYTES RELATIVE PERCENT: 10.5 %
MAGNESIUM: 1.9 MG/DL (ref 1.8–2.4)
MCH RBC QN AUTO: 26.6 PG (ref 26–34)
MCHC RBC AUTO-ENTMCNC: 32.9 G/DL (ref 31–36)
MCV RBC AUTO: 81 FL (ref 80–100)
MONOCYTES ABSOLUTE: 0.6 K/UL (ref 0–1.3)
MONOCYTES RELATIVE PERCENT: 4.6 %
NEUTROPHILS ABSOLUTE: 10.4 K/UL (ref 1.7–7.7)
NEUTROPHILS RELATIVE PERCENT: 82.9 %
PDW BLD-RTO: 13.1 % (ref 12.4–15.4)
PLATELET # BLD: 307 K/UL (ref 135–450)
PMV BLD AUTO: 8.8 FL (ref 5–10.5)
POTASSIUM SERPL-SCNC: 4.1 MMOL/L (ref 3.5–5.1)
RBC # BLD: 3.49 M/UL (ref 4.2–5.9)
SODIUM BLD-SCNC: 136 MMOL/L (ref 136–145)
WBC # BLD: 12.6 K/UL (ref 4–11)

## 2021-04-24 PROCEDURE — 36415 COLL VENOUS BLD VENIPUNCTURE: CPT

## 2021-04-24 PROCEDURE — 1200000000 HC SEMI PRIVATE

## 2021-04-24 PROCEDURE — 6370000000 HC RX 637 (ALT 250 FOR IP): Performed by: NURSE PRACTITIONER

## 2021-04-24 PROCEDURE — 2580000003 HC RX 258: Performed by: UROLOGY

## 2021-04-24 PROCEDURE — 83735 ASSAY OF MAGNESIUM: CPT

## 2021-04-24 PROCEDURE — 2500000003 HC RX 250 WO HCPCS: Performed by: INTERNAL MEDICINE

## 2021-04-24 PROCEDURE — 6360000002 HC RX W HCPCS: Performed by: UROLOGY

## 2021-04-24 PROCEDURE — 85025 COMPLETE CBC W/AUTO DIFF WBC: CPT

## 2021-04-24 PROCEDURE — 94761 N-INVAS EAR/PLS OXIMETRY MLT: CPT

## 2021-04-24 PROCEDURE — 6370000000 HC RX 637 (ALT 250 FOR IP): Performed by: HOSPITALIST

## 2021-04-24 PROCEDURE — 80048 BASIC METABOLIC PNL TOTAL CA: CPT

## 2021-04-24 PROCEDURE — 6360000002 HC RX W HCPCS: Performed by: NURSE PRACTITIONER

## 2021-04-24 RX ORDER — LACTOBACILLUS RHAMNOSUS GG 10B CELL
1 CAPSULE ORAL 2 TIMES DAILY WITH MEALS
Status: DISCONTINUED | OUTPATIENT
Start: 2021-04-24 | End: 2021-04-26

## 2021-04-24 RX ADMIN — CEFEPIME HYDROCHLORIDE 2000 MG: 2 INJECTION, POWDER, FOR SOLUTION INTRAVENOUS at 10:33

## 2021-04-24 RX ADMIN — ENOXAPARIN SODIUM 40 MG: 40 INJECTION SUBCUTANEOUS at 08:46

## 2021-04-24 RX ADMIN — Medication 1500 MG: at 00:10

## 2021-04-24 RX ADMIN — MORPHINE SULFATE 2 MG: 2 INJECTION, SOLUTION INTRAMUSCULAR; INTRAVENOUS at 09:22

## 2021-04-24 RX ADMIN — METRONIDAZOLE 500 MG: 500 INJECTION, SOLUTION INTRAVENOUS at 00:10

## 2021-04-24 RX ADMIN — CEFEPIME HYDROCHLORIDE 2000 MG: 2 INJECTION, POWDER, FOR SOLUTION INTRAVENOUS at 21:04

## 2021-04-24 RX ADMIN — QUETIAPINE FUMARATE 25 MG: 25 TABLET ORAL at 08:46

## 2021-04-24 RX ADMIN — METRONIDAZOLE 500 MG: 500 INJECTION, SOLUTION INTRAVENOUS at 17:31

## 2021-04-24 RX ADMIN — OXYCODONE HYDROCHLORIDE AND ACETAMINOPHEN 1 TABLET: 5; 325 TABLET ORAL at 08:50

## 2021-04-24 RX ADMIN — Medication 1500 MG: at 13:08

## 2021-04-24 RX ADMIN — METRONIDAZOLE 500 MG: 500 INJECTION, SOLUTION INTRAVENOUS at 08:46

## 2021-04-24 RX ADMIN — Medication 1 CAPSULE: at 17:30

## 2021-04-24 RX ADMIN — Medication 10 ML: at 21:05

## 2021-04-24 RX ADMIN — QUETIAPINE FUMARATE 25 MG: 25 TABLET ORAL at 21:04

## 2021-04-24 ASSESSMENT — PAIN SCALES - GENERAL: PAINLEVEL_OUTOF10: 2

## 2021-04-24 NOTE — PROGRESS NOTES
Pt Name: Jose Khan  Medical Record Number: [id number]  Date of Birth 1970   Today's Date: 4/24/2021      Subjective:  Much more alert today. Complains of persistent pain in scrotum, especially with dressing change. ROS: Constitutional: No fever    Vitals:  Vitals:    04/24/21 0433 04/24/21 0818 04/24/21 1021 04/24/21 1505   BP: 107/75  113/72 113/75   Pulse: 71  79 65   Resp: 16  16 16   Temp: 98.6 °F (37 °C)  97.6 °F (36.4 °C) 98.4 °F (36.9 °C)   TempSrc: Oral  Oral Oral   SpO2: 96% 98% 96% 97%   Weight: 236 lb 8.9 oz (107.3 kg)      Height:         I/O last 3 completed shifts: In: 840 [P.O.:840]  Out: 1700 [Urine:1700]    Exam:  General: Awake, oriented x 3, no acute distress  Respiratory: Nonlabored breathing  Abdomen: Soft, non-tender, non-distended, no masses  : SP tube intact with yellow output, Scrotum with packing - changed at bedside with moderate pain.  Mostly good granulation tissue but still some bloody/ cloudy fluid drainage  Skin: Skin color, texture, turgor normal, no rashes or lesions  Neurologic: no gross deficits    CURRENT MEDICATIONS   Scheduled Meds:   metroNIDAZOLE  500 mg Intravenous Q8H    vancomycin  1,500 mg Intravenous Q12H    vancomycin (VANCOCIN) intermittent dosing (placeholder)   Other RX Placeholder    sodium chloride flush  5-40 mL Intravenous 2 times per day    enoxaparin  40 mg Subcutaneous Daily    cefepime  2,000 mg Intravenous Q12H    QUEtiapine  25 mg Oral BID     Continuous Infusions:   sodium chloride       PRN Meds:.morphine, oxyCODONE-acetaminophen, morphine, sodium chloride flush, sodium chloride, acetaminophen **OR** acetaminophen    LABS     Recent Labs     04/21/21  1825 04/22/21  0509 04/23/21  0921 04/24/21  0434   WBC 23.4* 18.2* 21.4* 12.6*   HGB 11.5* 10.0* 10.2* 9.3*   HCT 34.1* 29.5* 30.5* 28.2*    295 305 307   * 134* 135* 136   K 4.1 4.0 3.9 4.1   CL 95* 98* 99 102   CO2 22 26 25 25   BUN 13 11 11 14   CREATININE 0.9 0.8* 0.6* 0.6*   MG  --  1.80 2.10 1.90   CALCIUM 9.4 8.7 8.9 8.4   AST 18  --   --   --    ALT 19  --   --   --    BILITOT 0.4  --   --   --      ASSESSMENT   1. Hospital day # 2  2. 50y. o. male with Perineal abscess. S/p I&D, attempted cystoscopy and suprapubic exchange with Dr. Maurizio Overton 4/22/21. 3. Urine culture NGTD. PLAN   1. Wound care consult to assist with dressing changes, recommendations. For now continue daily dressing changes. 2. Continue antibiotics.    3. Will need MRI and F/u with Dr. Yady Madrid regarding urethral pathology     Edward Plummer MD 4/24/2021 3:46 PM

## 2021-04-24 NOTE — PROGRESS NOTES
Date    CYSTOSCOPY N/A 3/29/2021    CYSTOSCOPY,  SUPRAPUBIC TUBE INSERTION performed by Otf Condon MD at [address] [facility] Right 4/22/2021    INCISION AND DRAINAGE SCROTAL ABSCESS, CYSTOSCOPY, EXCHANGE OF SUPRA PUBIC CATHETER performed by Otf Condon MD at [facility]:  Patient has no known allergies. Past medical and surgical history reviewed. Any changes have been noted. PHYSICAL EXAM:  /75   Pulse 71   Temp 98.6 °F (37 °C) (Oral)   Resp 16   Ht 6' (1.829 m)   Wt 236 lb 8.9 oz (107.3 kg)   SpO2 98%   BMI 32.08 kg/m²       Intake/Output Summary (Last 24 hours) at 4/24/2021 0908  Last data filed at 4/24/2021 0433  Gross per 24 hour   Intake 3660 ml   Output 1700 ml   Net 1960 ml       General: Alert and oriented. Resting in bed in no apparent distress. Pleasant and cooperative. HEENT: Normocephalic. Atraumatic. Pupils equal and reactive. EOM intact. Oral mucosa pink/moist/intact. Neck: Supple. Symmetrical. Trachea midline. Lungs: Clear to auscultation bilaterally. Respirations even and unlabored. Chest: Exam unremarkable. Cardiac: S1/S2 noted. Regular Rhythm and rate. Abdomen/GI: Soft. Non-tender. Non-distended. BS+. : Dressing in place. CDI. SP catheter +. Extremities: PP+. Atraumatic. No redness/cyanosis/edema noted. Brisk cap refill. Skin: Dry and intact. No lesions noted, except as above. Neuro: Grossly intact. No focal deficits noted.      LABS:    Lab Results   Component Value Date    WBC 12.6 (H) 04/24/2021    HGB 9.3 (L) 04/24/2021    HCT 28.2 (L) 04/24/2021    MCV 81.0 04/24/2021     04/24/2021    LYMPHOPCT 10.5 04/24/2021    RBC 3.49 (L) 04/24/2021    MCH 26.6 04/24/2021    MCHC 32.9 04/24/2021    RDW 13.1 04/24/2021       Lab Results   Component Value Date    CREATININE 0.6 (L) 04/24/2021    BUN 14 04/24/2021     04/24/2021    K 4.1 04/24/2021     04/24/2021    CO2 25 04/24/2021       Lab Results Component Value Date    MG 1.90 04/24/2021       Lab Results   Component Value Date    ALT 19 04/21/2021    AST 18 04/21/2021    ALKPHOS 97 04/21/2021    BILITOT 0.4 04/21/2021        No flowsheet data found. Lab Results   Component Value Date    LABA1C 5.8 12/09/2020       Imaging:  CT HEAD WO CONTRAST   Final Result   No acute intracranial abnormality. Mild paranasal sinus disease         US SCROTUM AND TESTICLES   Final Result   1. Mild bilateral testicular hydrocele. 2. Heterogeneous generally hypoechoic region posterior to the inferior aspect   of the right testicle measuring 4.7 x 3.1 x 2.7 cm in dimension. Differential diagnostic considerations include scrotal hematocele versus   scrotal wall hematoma. Scheduled and prn Medications:    Scheduled Meds:   metroNIDAZOLE  500 mg Intravenous Q8H    vancomycin  1,500 mg Intravenous Q12H    vancomycin (VANCOCIN) intermittent dosing (placeholder)   Other RX Placeholder    sodium chloride flush  5-40 mL Intravenous 2 times per day    enoxaparin  40 mg Subcutaneous Daily    cefepime  2,000 mg Intravenous Q12H    QUEtiapine  25 mg Oral BID     Continuous Infusions:   sodium chloride       PRN Meds:.morphine, oxyCODONE-acetaminophen, morphine, sodium chloride flush, sodium chloride, acetaminophen **OR** acetaminophen    Assessment & Plan:        Acute metabolic encephalopathy/altered mental status   2/2 infection? Treat scrotal/perineal abscesses  - Urology following.  - S/p I&D  - ID following  - Abx per ID  - Psych following. Supportive therapies. Treat underlying cause. Sepsis  Improving  Treat underlying cause  Supportive therapies    Scrotal/perineal abscesses  Urology following  S/p I&D on 4/22/21  Follow wound/blood cx  Adjust abx per ID rec  Local wound care as instructed/    Hx of urinary retention  Urology following  S/p sp catheter placed on prior admissions    Bacteremia  ID following. Appreciate assistance.    Blood INFECTIOUS DISEASES  IP CONSULT TO UROLOGY  IP CONSULT TO PSYCHIATRY      Disposition:  [] Home  [] Home with home health [] Rehab [] Psych [] SNF  [] LTAC  [] Long term nursing home or group home [] Transfer to ICU  [] Transfer to PCU [] Other:  [x] TBD    Code Status: Full Code    ELOS: TBD      SANTANA Palafox NP  04/24/21

## 2021-04-24 NOTE — PROGRESS NOTES
Pt awake and alert. Rates pain a 4/10. Scrotal dressing intact. SP cath intact. Monitor showing NSR. Call light in reach.

## 2021-04-24 NOTE — ACP (ADVANCE CARE PLANNING)
ADVANCED CARE PLANNING    Name:Kolby Abreu       :  1970              MRN:  [id number]      Purpose of Encounter: Advanced care planning in light of Scrotal/perineal abscess. Parties in attendance: :SANTANA Penny - ELVIRA, Family members:N/A. Decisional Capacity:Yes    Subjective/Patient Story: Patient/family understand(s) that his function continues to deteriorate.      ________________________________________________________  The patient/family no longer wishes further curative interventions, including hospitalization, if there is no long term benefit []     The patient/family wish(es) to continue further interventions, patient but declines resuscitation: []     The patient/family wish(es) to continue further interventions and remain FULL code:  [x]   ________________________________________________________  Objective/Medical Story: The patient is a 53 yrs old male, who  has a past medical history of Anxiety, Elevated LFTs, History of tobacco abuse, Hypertension, and Prediabetes. The patient had been admitted the month prior to presentation  and was discharged on  after being found to have concerning findings of bladder outlet obstruction and had a suprapubic catheter placed at the time and was also treated for sepsis possibly secondary due to a urinary tract infection as well as possible abscess of his penis. He presented to [facility] ER after his significant other noted visual hallucinations, increased fatigue and degreased PO intake. The patient had been unable to f/u in person with urology and noted significant scrotal swelling. US of the scrotum was concerning for hydrocele v hematoma v hematocele. WBC was elevated. The patient was admitted for further workup and treatment. IV vancomycin and rocephin were initiated. Urology was consulted. Psychiatry was consulted.  The patient was taken to the OR on 21 emergently for I&D of scrotal abscess/perineal abscess, cystoscopy and SP exchange. Infectious disease service was consulted for assistance with abx management. .    Goals of Care Determinations: The patient/family wish(es) to focus on continued recovery and furthering his understanding of his case. Plan: Will notify Doug Powers MD of change in care plan. Will look at further interventions as needed. Code Status: At this time, the patient wishes to be Full Code    Time Spent on Advanced Planning Discussion: 23 minutes    Advanced Care Planning Documents: See above. See progress notes. Electronically signed by SANTANA Randall NP on 4/24/2021 at 4:03 PM  Thank you Doug Powers MD for the opportunity to be involved in this patient's care. If you have any questions or concerns please feel free to contact me at [phone].

## 2021-04-25 LAB
ANAEROBIC CULTURE: ABNORMAL
ANAEROBIC CULTURE: ABNORMAL
ANION GAP SERPL CALCULATED.3IONS-SCNC: 9 MMOL/L (ref 3–16)
BASOPHILS ABSOLUTE: 0.2 K/UL (ref 0–0.2)
BASOPHILS RELATIVE PERCENT: 1.6 %
BLOOD CULTURE, ROUTINE: ABNORMAL
BLOOD CULTURE, ROUTINE: ABNORMAL
BUN BLDV-MCNC: 14 MG/DL (ref 7–20)
CALCIUM SERPL-MCNC: 8.9 MG/DL (ref 8.3–10.6)
CHLORIDE BLD-SCNC: 104 MMOL/L (ref 99–110)
CO2: 26 MMOL/L (ref 21–32)
CREAT SERPL-MCNC: 0.8 MG/DL (ref 0.9–1.3)
CULTURE SURGICAL: ABNORMAL
CULTURE, BLOOD 2: ABNORMAL
EOSINOPHILS ABSOLUTE: 0.3 K/UL (ref 0–0.6)
EOSINOPHILS RELATIVE PERCENT: 3 %
GFR AFRICAN AMERICAN: >60
GFR NON-AFRICAN AMERICAN: >60
GLUCOSE BLD-MCNC: 107 MG/DL (ref 70–99)
GRAM STAIN RESULT: ABNORMAL
HCT VFR BLD CALC: 28.6 % (ref 40.5–52.5)
HEMOGLOBIN: 9.5 G/DL (ref 13.5–17.5)
LYMPHOCYTES ABSOLUTE: 1.4 K/UL (ref 1–5.1)
LYMPHOCYTES RELATIVE PERCENT: 14 %
MAGNESIUM: 1.9 MG/DL (ref 1.8–2.4)
MCH RBC QN AUTO: 27.1 PG (ref 26–34)
MCHC RBC AUTO-ENTMCNC: 33.4 G/DL (ref 31–36)
MCV RBC AUTO: 81.2 FL (ref 80–100)
MONOCYTES ABSOLUTE: 0.5 K/UL (ref 0–1.3)
MONOCYTES RELATIVE PERCENT: 5.6 %
NEUTROPHILS ABSOLUTE: 7.4 K/UL (ref 1.7–7.7)
NEUTROPHILS RELATIVE PERCENT: 75.8 %
ORGANISM: ABNORMAL
PDW BLD-RTO: 13.2 % (ref 12.4–15.4)
PLATELET # BLD: 313 K/UL (ref 135–450)
PMV BLD AUTO: 8.5 FL (ref 5–10.5)
POTASSIUM SERPL-SCNC: 4.1 MMOL/L (ref 3.5–5.1)
RBC # BLD: 3.52 M/UL (ref 4.2–5.9)
SODIUM BLD-SCNC: 139 MMOL/L (ref 136–145)
VANCOMYCIN TROUGH: 16.4 UG/ML (ref 10–20)
WBC # BLD: 9.8 K/UL (ref 4–11)

## 2021-04-25 PROCEDURE — 2580000003 HC RX 258: Performed by: UROLOGY

## 2021-04-25 PROCEDURE — 1200000000 HC SEMI PRIVATE

## 2021-04-25 PROCEDURE — 36415 COLL VENOUS BLD VENIPUNCTURE: CPT

## 2021-04-25 PROCEDURE — 80202 ASSAY OF VANCOMYCIN: CPT

## 2021-04-25 PROCEDURE — 85025 COMPLETE CBC W/AUTO DIFF WBC: CPT

## 2021-04-25 PROCEDURE — 6360000002 HC RX W HCPCS: Performed by: UROLOGY

## 2021-04-25 PROCEDURE — 83735 ASSAY OF MAGNESIUM: CPT

## 2021-04-25 PROCEDURE — 80048 BASIC METABOLIC PNL TOTAL CA: CPT

## 2021-04-25 PROCEDURE — 6360000002 HC RX W HCPCS: Performed by: HOSPITALIST

## 2021-04-25 PROCEDURE — 6370000000 HC RX 637 (ALT 250 FOR IP): Performed by: NURSE PRACTITIONER

## 2021-04-25 PROCEDURE — 2500000003 HC RX 250 WO HCPCS: Performed by: INTERNAL MEDICINE

## 2021-04-25 PROCEDURE — 6370000000 HC RX 637 (ALT 250 FOR IP): Performed by: HOSPITALIST

## 2021-04-25 PROCEDURE — 94761 N-INVAS EAR/PLS OXIMETRY MLT: CPT

## 2021-04-25 RX ORDER — ESCITALOPRAM OXALATE 10 MG/1
20 TABLET ORAL DAILY
Status: DISCONTINUED | OUTPATIENT
Start: 2021-04-25 | End: 2021-04-28 | Stop reason: HOSPADM

## 2021-04-25 RX ADMIN — Medication 1500 MG: at 13:30

## 2021-04-25 RX ADMIN — ESCITALOPRAM OXALATE 20 MG: 10 TABLET ORAL at 09:42

## 2021-04-25 RX ADMIN — METRONIDAZOLE 500 MG: 500 INJECTION, SOLUTION INTRAVENOUS at 16:54

## 2021-04-25 RX ADMIN — Medication 10 ML: at 20:41

## 2021-04-25 RX ADMIN — Medication 1 CAPSULE: at 16:54

## 2021-04-25 RX ADMIN — QUETIAPINE FUMARATE 25 MG: 25 TABLET ORAL at 09:42

## 2021-04-25 RX ADMIN — METRONIDAZOLE 500 MG: 500 INJECTION, SOLUTION INTRAVENOUS at 23:59

## 2021-04-25 RX ADMIN — MORPHINE SULFATE 2 MG: 2 INJECTION, SOLUTION INTRAMUSCULAR; INTRAVENOUS at 14:01

## 2021-04-25 RX ADMIN — ENOXAPARIN SODIUM 40 MG: 40 INJECTION SUBCUTANEOUS at 09:42

## 2021-04-25 RX ADMIN — CEFEPIME HYDROCHLORIDE 2000 MG: 2 INJECTION, POWDER, FOR SOLUTION INTRAVENOUS at 11:18

## 2021-04-25 RX ADMIN — QUETIAPINE FUMARATE 25 MG: 25 TABLET ORAL at 20:40

## 2021-04-25 RX ADMIN — METRONIDAZOLE 500 MG: 500 INJECTION, SOLUTION INTRAVENOUS at 00:54

## 2021-04-25 RX ADMIN — Medication 1500 MG: at 02:13

## 2021-04-25 RX ADMIN — METRONIDAZOLE 500 MG: 500 INJECTION, SOLUTION INTRAVENOUS at 09:39

## 2021-04-25 RX ADMIN — CEFEPIME HYDROCHLORIDE 2000 MG: 2 INJECTION, POWDER, FOR SOLUTION INTRAVENOUS at 20:40

## 2021-04-25 RX ADMIN — Medication 1 CAPSULE: at 09:42

## 2021-04-25 ASSESSMENT — PAIN SCALES - GENERAL
PAINLEVEL_OUTOF10: 10
PAINLEVEL_OUTOF10: 0

## 2021-04-25 ASSESSMENT — PAIN DESCRIPTION - FREQUENCY: FREQUENCY: INTERMITTENT

## 2021-04-25 ASSESSMENT — PAIN DESCRIPTION - LOCATION: LOCATION: SCROTUM

## 2021-04-25 NOTE — PLAN OF CARE
Problem: Falls - Risk of:  Goal: Will remain free from falls  Description: Will remain free from falls  4/25/2021 1004 by Ashly Mathew RN  Outcome: Ongoing     Problem: Falls - Risk of:  Goal: Absence of physical injury  Description: Absence of physical injury  4/25/2021 1004 by Ashly Mathew RN  Outcome: Ongoing     Problem: Skin Integrity:  Goal: Will show no infection signs and symptoms  Description: Will show no infection signs and symptoms  4/25/2021 1004 by Ashly Mathew RN  Outcome: Ongoing     Problem: Skin Integrity:  Goal: Absence of new skin breakdown  Description: Absence of new skin breakdown  4/25/2021 1004 by Ashly Mathew RN  Outcome: Ongoing     Problem: Confusion - Acute:  Goal: Absence of continued neurological deterioration signs and symptoms  Description: Absence of continued neurological deterioration signs and symptoms  4/25/2021 1004 by Ashly Mathew RN  Outcome: Ongoing     Problem: Confusion - Acute:  Goal: Mental status will be restored to baseline  Description: Mental status will be restored to baseline  4/25/2021 1004 by Ashly Mathew RN  Outcome: Ongoing     Problem: Discharge Planning:  Goal: Ability to perform activities of daily living will improve  Description: Ability to perform activities of daily living will improve  4/25/2021 1004 by Ashly Mathew RN  Outcome: Ongoing     Problem: Discharge Planning:  Goal: Participates in care planning  Description: Participates in care planning  4/25/2021 1004 by Ashly Mathew RN  Outcome: Ongoing     Problem: Injury - Risk of, Physical Injury:  Goal: Will remain free from falls  Description: Will remain free from falls  4/25/2021 1004 by Ashly Mathew RN  Outcome: Ongoing     Problem: Injury - Risk of, Physical Injury:  Goal: Absence of physical injury  Description: Absence of physical injury  4/25/2021 1004 by Ashly Mathew RN  Outcome: Ongoing     Problem: Mood - Altered:  Goal: Mood stable  Description: Mood stable  4/25/2021 1004 by Ethel Ramachandran RN  Outcome: Ongoing     Problem: Mood - Altered:  Goal: Absence of abusive behavior  Description: Absence of abusive behavior  4/25/2021 1004 by Ethel Ramachandran RN  Outcome: Ongoing     Problem: Mood - Altered:  Goal: Verbalizations of feeling emotionally comfortable while being cared for will increase  Description: Verbalizations of feeling emotionally comfortable while being cared for will increase  4/25/2021 1004 by Ethel Ramachandran RN  Outcome: Ongoing     Problem: Psychomotor Activity - Altered:  Goal: Absence of psychomotor disturbance signs and symptoms  Description: Absence of psychomotor disturbance signs and symptoms  4/25/2021 1004 by Ethel Ramachandran RN  Outcome: Ongoing     Problem: Sensory Perception - Impaired:  Goal: Demonstrations of improved sensory functioning will increase  Description: Demonstrations of improved sensory functioning will increase  4/25/2021 1004 by Ethel Ramachandran RN  Outcome: Ongoing     Problem: Sensory Perception - Impaired:  Goal: Decrease in sensory misperception frequency  Description: Decrease in sensory misperception frequency  4/25/2021 1004 by Ethel Ramachandran RN  Outcome: Ongoing     Problem: Sensory Perception - Impaired:  Goal: Able to refrain from responding to false sensory perceptions  Description: Able to refrain from responding to false sensory perceptions  4/25/2021 1004 by Ethel Ramachandran RN  Outcome: Ongoing     Problem: Sensory Perception - Impaired:  Goal: Demonstrates accurate environmental perceptions  Description: Demonstrates accurate environmental perceptions  4/25/2021 0344 by Elena Kevin RN  Outcome: Ongoing     Problem: Sensory Perception - Impaired:  Goal: Able to distinguish between reality-based and nonreality-based thinking  Description: Able to distinguish between reality-based and nonreality-based thinking  4/25/2021 1004 by Ethel Ramachandran RN  Outcome: Ongoing     Problem: Sensory Perception -

## 2021-04-25 NOTE — PROGRESS NOTES
1. 90 1.90   CALCIUM 8.9 8.4 8.9     ASSESSMENT   1. Hospital day # 3  2. 50y. o. male with Perineal abscess. S/p I&D, attempted cystoscopy and suprapubic exchange with Dr. Dinesh Lim 4/22/21. 3. Urine culture NGTD. PLAN   1. Wound care consult to assist with dressing changes, recommendations. For now continue daily dressing changes. 2. Continue antibiotics.    3. Will need MRI and F/u with Dr. Mariajose Vang regarding urethral pathology     Iris Perez MD 4/25/2021 3:23 PM

## 2021-04-25 NOTE — PROGRESS NOTES
Hospital Medicine Progress Note      Admit Date: 4/21/2021         Overnight Events: No    CC: F/U for Scrotal/perineal abscess    HPI: The patient is a 53 yrs old male, who  has a past medical history of Anxiety, Elevated LFTs, History of tobacco abuse, Hypertension, and Prediabetes. The patient had been admitted the month prior to presentation  and was discharged on 1 April after being found to have concerning findings of bladder outlet obstruction and had a suprapubic catheter placed at the time and was also treated for sepsis possibly secondary due to a urinary tract infection as well as possible abscess of his penis. He presented to [facility] ER after his significant other noted visual hallucinations, increased fatigue and degreased PO intake. The patient had been unable to f/u in person with urology and noted significant scrotal swelling. US of the scrotum was concerning for hydrocele v hematoma v hematocele. WBC was elevated. The patient was admitted for further workup and treatment. IV vancomycin and rocephin were initiated. Urology was consulted. Psychiatry was consulted. The patient was taken to the OR on 4/22/21 emergently for I&D of scrotal abscess/perineal abscess, cystoscopy and SP exchange. Infectious disease service was consulted for assistance with abx management. Blood cultures from 4/21/21 positive for micrococcus luteus (2/2). Repeat blood cx remained negative. Surgical cultures were positive for micrococcus luteus, finegoldia magna, prevotella melaninogenica, lactobacillus species. Interval History/Subjective:No new complaints. He is feeling a little bit better than yesterday. Review of Systems:     The patient denied headaches, visual changes, LOC, SOB, CP, ABD pain, N/V/D, skin changes, new or worsening weakness or neuromuscular deficits. Comprehensive ROS negative except as mentioned above.     Past Medical History:        Diagnosis Date    Anxiety     was on paxil in the past  Elevated LFTs     History of tobacco abuse     Hypertension     Prediabetes        Past Surgical History:        Procedure Laterality Date    CYSTOSCOPY N/A 3/29/2021    CYSTOSCOPY,  SUPRAPUBIC TUBE INSERTION performed by Cesar Mendoza MD at [address] [facility] Right 4/22/2021    INCISION AND DRAINAGE SCROTAL ABSCESS, CYSTOSCOPY, EXCHANGE OF SUPRA PUBIC CATHETER performed by Cesar Mendoza MD at [address] [facility]:  Patient has no known allergies. Past medical and surgical history reviewed. Any changes have been noted. PHYSICAL EXAM:  BP (!) 144/94   Pulse 65   Temp 98.3 °F (36.8 °C) (Oral)   Resp 17   Ht 6' (1.829 m)   Wt 237 lb 3.4 oz (107.6 kg)   SpO2 96%   BMI 32.17 kg/m²       Intake/Output Summary (Last 24 hours) at 4/25/2021 0923  Last data filed at 4/25/2021 0347  Gross per 24 hour   Intake 1240 ml   Output 1900 ml   Net -660 ml       General: Alert and oriented. Resting in bed in no apparent distress. Pleasant and cooperative. HEENT: Normocephalic. Atraumatic. Pupils equal and reactive. EOM intact. Oral mucosa pink/moist/intact. Neck: Supple. Symmetrical. Trachea midline. Lungs: Clear to auscultation bilaterally. Respirations even and unlabored. Chest: Exam unremarkable. Cardiac: S1/S2 noted. Regular Rhythm and rate. Abdomen/GI: Soft. Non-tender. Non-distended. BS+. : Dressing in place. CDI. SP catheter +. Extremities: PP+. Atraumatic. No redness/cyanosis/edema noted. Brisk cap refill. Skin: Dry and intact. No lesions noted, except as above. Neuro: Grossly intact. No focal deficits noted.      LABS:    Lab Results   Component Value Date    WBC 9.8 04/25/2021    HGB 9.5 (L) 04/25/2021    HCT 28.6 (L) 04/25/2021    MCV 81.2 04/25/2021     04/25/2021    LYMPHOPCT 14.0 04/25/2021    RBC 3.52 (L) 04/25/2021    MCH 27.1 04/25/2021    MCHC 33.4 04/25/2021    RDW 13.2 04/25/2021       Lab Results   Component Value Date    CREATININE 0.8 (L) micrococcus luteus (2/2). Repeat blood cx remained negative. Surgical cultures were positive for micrococcus luteus, finegoldia magna, prevotella melaninogenica, lactobacillus species. Adjust abx per ID rec  Local wound care as instructed  [facility] consult appreciated. Will need MRI and will need OP f/u with Dr. Lisbet Oneill regarding urethral pathology    Hx of urinary retention  Urology following  S/p sp catheter placed on prior admissions    Bacteremia  ID following. Appreciate assistance. Blood cx + for Micrococcus luteus   Repeat blood cx NGTD  IV vancomycin, cefepime, flagyl  Adjust abx pe ID rec    Pain management  Continue current pain regimen  Initiate/titrate pain medications as appropriate/as necessary  Decrease pain medications as pain improves/as able   Encourage use of PO pain medications prior to IV pain medications when possible. Anxiety  Continue medications as ordered  Psych following. Appreciate assistance. Supportive therapies    Obesity, Body mass index is 32.17 kg/m². [x] Class I - 30.0 to 34.9 kg/m2  [] Class II - 35.0 to 39.9 kg/m2  [] Class III - ?40 kg/m2 (also referred to as severe, extreme, morbid or massive obesity)   [] Super Obesity  - > 50% kg/m2  [] Super Super Obesity  - > 27% kg/m2  Complicating assessment and treatment. Obesity Places the patient at risk for multiple co-morbidities as well as early death and may be contributing to the patient's presentation. Supportive care. Encourage therapeutic lifestyle changes. The patient's plan of care including his surgery (from surgical note) were reviewed in great detail multiple times. The patient verbalized understanding and felt that he had a better understanding than he previously did. He verbalized agreement with the above mentioned plan of care. 23 minutes spent at the bedside reviewing the above. Continue current regimen/therapies. Monitor. Adjust medical regimen as appropriate.     Body mass index is 32.17

## 2021-04-25 NOTE — PLAN OF CARE
Problem: Falls - Risk of:  Goal: Will remain free from falls  Description: Will remain free from falls  Outcome: Ongoing  Goal: Absence of physical injury  Description: Absence of physical injury  Outcome: Ongoing     Problem: Skin Integrity:  Goal: Will show no infection signs and symptoms  Description: Will show no infection signs and symptoms  Outcome: Ongoing  Goal: Absence of new skin breakdown  Description: Absence of new skin breakdown  Outcome: Ongoing     Problem: Confusion - Acute:  Goal: Absence of continued neurological deterioration signs and symptoms  Description: Absence of continued neurological deterioration signs and symptoms  Outcome: Ongoing  Goal: Mental status will be restored to baseline  Description: Mental status will be restored to baseline  Outcome: Ongoing     Problem: Discharge Planning:  Goal: Ability to perform activities of daily living will improve  Description: Ability to perform activities of daily living will improve  Outcome: Ongoing  Goal: Participates in care planning  Description: Participates in care planning  Outcome: Ongoing     Problem: Injury - Risk of, Physical Injury:  Goal: Will remain free from falls  Description: Will remain free from falls  Outcome: Ongoing  Goal: Absence of physical injury  Description: Absence of physical injury  Outcome: Ongoing     Problem: Mood - Altered:  Goal: Mood stable  Description: Mood stable  Outcome: Ongoing  Goal: Absence of abusive behavior  Description: Absence of abusive behavior  Outcome: Ongoing  Goal: Verbalizations of feeling emotionally comfortable while being cared for will increase  Description: Verbalizations of feeling emotionally comfortable while being cared for will increase  Outcome: Ongoing     Problem: Psychomotor Activity - Altered:  Goal: Absence of psychomotor disturbance signs and symptoms  Description: Absence of psychomotor disturbance signs and symptoms  Outcome: Ongoing     Problem: Sensory Perception - Impaired:  Goal: Demonstrations of improved sensory functioning will increase  Description: Demonstrations of improved sensory functioning will increase  Outcome: Ongoing  Goal: Decrease in sensory misperception frequency  Description: Decrease in sensory misperception frequency  Outcome: Ongoing  Goal: Able to refrain from responding to false sensory perceptions  Description: Able to refrain from responding to false sensory perceptions  Outcome: Ongoing  Goal: Demonstrates accurate environmental perceptions  Description: Demonstrates accurate environmental perceptions  Outcome: Ongoing  Goal: Able to distinguish between reality-based and nonreality-based thinking  Description: Able to distinguish between reality-based and nonreality-based thinking  Outcome: Ongoing  Goal: Able to interrupt nonreality-based thinking  Description: Able to interrupt nonreality-based thinking  Outcome: Ongoing     Problem: Sleep Pattern Disturbance:  Goal: Appears well-rested  Description: Appears well-rested  Outcome: Ongoing     Problem: Non-Violent Restraints  Goal: Removal from restraints as soon as assessed to be safe  Outcome: Ongoing  Goal: No harm/injury to patient while restraints in use  Outcome: Ongoing  Goal: Patient's dignity will be maintained  Outcome: Ongoing     Problem: Pain:  Goal: Pain level will decrease  Description: Pain level will decrease  Outcome: Ongoing  Goal: Control of acute pain  Description: Control of acute pain  Outcome: Ongoing  Goal: Control of chronic pain  Description: Control of chronic pain  Outcome: Ongoing

## 2021-04-26 ENCOUNTER — APPOINTMENT (OUTPATIENT)
Dept: MRI IMAGING | Age: 51
DRG: 710 | End: 2021-04-26
Payer: MEDICAID

## 2021-04-26 LAB
ANION GAP SERPL CALCULATED.3IONS-SCNC: 7 MMOL/L (ref 3–16)
BASOPHILS ABSOLUTE: 0 K/UL (ref 0–0.2)
BASOPHILS RELATIVE PERCENT: 0.6 %
BUN BLDV-MCNC: 13 MG/DL (ref 7–20)
CALCIUM SERPL-MCNC: 8.8 MG/DL (ref 8.3–10.6)
CHLORIDE BLD-SCNC: 107 MMOL/L (ref 99–110)
CO2: 27 MMOL/L (ref 21–32)
CREAT SERPL-MCNC: 0.7 MG/DL (ref 0.9–1.3)
EOSINOPHILS ABSOLUTE: 0.3 K/UL (ref 0–0.6)
EOSINOPHILS RELATIVE PERCENT: 3.1 %
GFR AFRICAN AMERICAN: >60
GFR NON-AFRICAN AMERICAN: >60
GLUCOSE BLD-MCNC: 108 MG/DL (ref 70–99)
HCT VFR BLD CALC: 29.8 % (ref 40.5–52.5)
HEMOGLOBIN: 9.9 G/DL (ref 13.5–17.5)
LYMPHOCYTES ABSOLUTE: 1.5 K/UL (ref 1–5.1)
LYMPHOCYTES RELATIVE PERCENT: 17.2 %
MAGNESIUM: 2 MG/DL (ref 1.8–2.4)
MCH RBC QN AUTO: 27 PG (ref 26–34)
MCHC RBC AUTO-ENTMCNC: 33.3 G/DL (ref 31–36)
MCV RBC AUTO: 81 FL (ref 80–100)
MONOCYTES ABSOLUTE: 0.5 K/UL (ref 0–1.3)
MONOCYTES RELATIVE PERCENT: 5.8 %
NEUTROPHILS ABSOLUTE: 6.4 K/UL (ref 1.7–7.7)
NEUTROPHILS RELATIVE PERCENT: 73.3 %
PDW BLD-RTO: 13.1 % (ref 12.4–15.4)
PLATELET # BLD: 337 K/UL (ref 135–450)
PMV BLD AUTO: 8.6 FL (ref 5–10.5)
POTASSIUM SERPL-SCNC: 4 MMOL/L (ref 3.5–5.1)
RBC # BLD: 3.67 M/UL (ref 4.2–5.9)
SODIUM BLD-SCNC: 141 MMOL/L (ref 136–145)
VANCOMYCIN TROUGH: 16.5 UG/ML (ref 10–20)
WBC # BLD: 8.8 K/UL (ref 4–11)

## 2021-04-26 PROCEDURE — 36569 INSJ PICC 5 YR+ W/O IMAGING: CPT

## 2021-04-26 PROCEDURE — 6360000004 HC RX CONTRAST MEDICATION: Performed by: NURSE PRACTITIONER

## 2021-04-26 PROCEDURE — 2580000003 HC RX 258: Performed by: UROLOGY

## 2021-04-26 PROCEDURE — 76937 US GUIDE VASCULAR ACCESS: CPT

## 2021-04-26 PROCEDURE — 80202 ASSAY OF VANCOMYCIN: CPT

## 2021-04-26 PROCEDURE — 94760 N-INVAS EAR/PLS OXIMETRY 1: CPT

## 2021-04-26 PROCEDURE — 80048 BASIC METABOLIC PNL TOTAL CA: CPT

## 2021-04-26 PROCEDURE — 83735 ASSAY OF MAGNESIUM: CPT

## 2021-04-26 PROCEDURE — 36415 COLL VENOUS BLD VENIPUNCTURE: CPT

## 2021-04-26 PROCEDURE — 6360000002 HC RX W HCPCS: Performed by: UROLOGY

## 2021-04-26 PROCEDURE — 6370000000 HC RX 637 (ALT 250 FOR IP): Performed by: HOSPITALIST

## 2021-04-26 PROCEDURE — 2500000003 HC RX 250 WO HCPCS: Performed by: INTERNAL MEDICINE

## 2021-04-26 PROCEDURE — C1751 CATH, INF, PER/CENT/MIDLINE: HCPCS

## 2021-04-26 PROCEDURE — 72197 MRI PELVIS W/O & W/DYE: CPT

## 2021-04-26 PROCEDURE — 99233 SBSQ HOSP IP/OBS HIGH 50: CPT | Performed by: INTERNAL MEDICINE

## 2021-04-26 PROCEDURE — 6370000000 HC RX 637 (ALT 250 FOR IP): Performed by: NURSE PRACTITIONER

## 2021-04-26 PROCEDURE — 1200000000 HC SEMI PRIVATE

## 2021-04-26 PROCEDURE — A9577 INJ MULTIHANCE: HCPCS | Performed by: NURSE PRACTITIONER

## 2021-04-26 PROCEDURE — 85025 COMPLETE CBC W/AUTO DIFF WBC: CPT

## 2021-04-26 PROCEDURE — 2580000003 HC RX 258: Performed by: INTERNAL MEDICINE

## 2021-04-26 RX ORDER — SODIUM CHLORIDE 0.9 % (FLUSH) 0.9 %
5-40 SYRINGE (ML) INJECTION EVERY 12 HOURS SCHEDULED
Status: DISCONTINUED | OUTPATIENT
Start: 2021-04-26 | End: 2021-04-28 | Stop reason: HOSPADM

## 2021-04-26 RX ORDER — LIDOCAINE HYDROCHLORIDE 10 MG/ML
5 INJECTION, SOLUTION EPIDURAL; INFILTRATION; INTRACAUDAL; PERINEURAL ONCE
Status: COMPLETED | OUTPATIENT
Start: 2021-04-26 | End: 2021-04-26

## 2021-04-26 RX ORDER — SODIUM CHLORIDE 0.9 % (FLUSH) 0.9 %
5-40 SYRINGE (ML) INJECTION PRN
Status: DISCONTINUED | OUTPATIENT
Start: 2021-04-26 | End: 2021-04-26 | Stop reason: SDUPTHER

## 2021-04-26 RX ORDER — SODIUM CHLORIDE 9 MG/ML
25 INJECTION, SOLUTION INTRAVENOUS PRN
Status: DISCONTINUED | OUTPATIENT
Start: 2021-04-26 | End: 2021-04-28 | Stop reason: HOSPADM

## 2021-04-26 RX ADMIN — ESCITALOPRAM OXALATE 20 MG: 10 TABLET ORAL at 08:09

## 2021-04-26 RX ADMIN — ENOXAPARIN SODIUM 40 MG: 40 INJECTION SUBCUTANEOUS at 08:09

## 2021-04-26 RX ADMIN — GADOBENATE DIMEGLUMINE 20 ML: 529 INJECTION, SOLUTION INTRAVENOUS at 14:26

## 2021-04-26 RX ADMIN — Medication 1500 MG: at 15:23

## 2021-04-26 RX ADMIN — CEFEPIME HYDROCHLORIDE 2000 MG: 2 INJECTION, POWDER, FOR SOLUTION INTRAVENOUS at 21:22

## 2021-04-26 RX ADMIN — METRONIDAZOLE 500 MG: 500 INJECTION, SOLUTION INTRAVENOUS at 17:11

## 2021-04-26 RX ADMIN — QUETIAPINE FUMARATE 25 MG: 25 TABLET ORAL at 08:10

## 2021-04-26 RX ADMIN — QUETIAPINE FUMARATE 25 MG: 25 TABLET ORAL at 21:22

## 2021-04-26 RX ADMIN — METRONIDAZOLE 500 MG: 500 INJECTION, SOLUTION INTRAVENOUS at 08:10

## 2021-04-26 RX ADMIN — Medication 1500 MG: at 01:15

## 2021-04-26 RX ADMIN — CEFEPIME HYDROCHLORIDE 2000 MG: 2 INJECTION, POWDER, FOR SOLUTION INTRAVENOUS at 10:08

## 2021-04-26 RX ADMIN — LIDOCAINE HYDROCHLORIDE 5 ML: 10 INJECTION, SOLUTION EPIDURAL; INFILTRATION; INTRACAUDAL; PERINEURAL at 19:00

## 2021-04-26 RX ADMIN — OXYCODONE HYDROCHLORIDE AND ACETAMINOPHEN 1 TABLET: 5; 325 TABLET ORAL at 16:37

## 2021-04-26 RX ADMIN — SODIUM CHLORIDE, PRESERVATIVE FREE 10 ML: 5 INJECTION INTRAVENOUS at 21:25

## 2021-04-26 RX ADMIN — Medication 1 CAPSULE: at 08:09

## 2021-04-26 RX ADMIN — OXYCODONE HYDROCHLORIDE AND ACETAMINOPHEN 1 TABLET: 5; 325 TABLET ORAL at 00:24

## 2021-04-26 RX ADMIN — Medication 1 CAPSULE: at 16:37

## 2021-04-26 ASSESSMENT — PAIN DESCRIPTION - PAIN TYPE: TYPE: ACUTE PAIN;SURGICAL PAIN

## 2021-04-26 ASSESSMENT — PAIN DESCRIPTION - PROGRESSION
CLINICAL_PROGRESSION: NOT CHANGED

## 2021-04-26 ASSESSMENT — PAIN SCALES - GENERAL
PAINLEVEL_OUTOF10: 6
PAINLEVEL_OUTOF10: 0
PAINLEVEL_OUTOF10: 3
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
## 2021-04-26 NOTE — PROGRESS NOTES
Hospital Medicine Progress Note      Admit Date: 4/21/2021       CC: F/U for scrotal/perineal abscess    HPI: The patient is a 53 yrs old male, who  has a past medical history of Anxiety, Elevated LFTs, History of tobacco abuse, Hypertension, and Prediabetes. The patient had been admitted the month prior to presentation  and was discharged on 1 April after being found to have concerning findings of bladder outlet obstruction and had a suprapubic catheter placed at the time and was also treated for sepsis possibly secondary due to a urinary tract infection as well as possible abscess of his penis. He presented to [facility] ER after his significant other noted visual hallucinations, increased fatigue and degreased PO intake. The patient had been unable to f/u in person with urology and noted significant scrotal swelling. US of the scrotum was concerning for hydrocele v hematoma v hematocele. WBC was elevated. The patient was admitted for further workup and treatment. IV vancomycin and rocephin were initiated. Urology was consulted. Psychiatry was consulted. The patient was taken to the OR on 4/22/21 emergently for I&D of scrotal abscess/perineal abscess, cystoscopy and SP exchange. Infectious disease service was consulted for assistance with abx management.      Blood cultures from 4/21/21 positive for micrococcus luteus (2/2). Repeat blood cx remained negative. Surgical cultures were positive for micrococcus luteus, finegoldia magna, prevotella melaninogenica, lactobacillus species. Interval History/Subjective: discussed plan for continuing IV antibiotics. Curious how long before he is \"back to normal.\" states he wants to be able to go back to work because he is up for getting a position due to his seniority and doesn't want to miss out on this. Will discuss more how to manage once discharged, his wound and suprapubic catheter. I&D dressing changed by Dr. Helena Ames todAY. Review of Systems:        The intermittent dosing (placeholder)   Other RX Placeholder    sodium chloride flush  5-40 mL Intravenous 2 times per day    enoxaparin  40 mg Subcutaneous Daily    cefepime  2,000 mg Intravenous Q12H    QUEtiapine  25 mg Oral BID     Continuous Infusions:   sodium chloride       PRN Meds:.morphine, oxyCODONE-acetaminophen, morphine, sodium chloride flush, sodium chloride, acetaminophen **OR** acetaminophen    Assessment & Plan:        Acute metabolic encephalopathy/altered mental status - resolved  2/2 infection? Treat scrotal/perineal abscesses  - Urology following.  - S/p I&D  - ID following  - Abx per ID  - Psych following. Supportive therapies. Treat underlying cause.     Sepsis  Improving  Treat underlying cause  Supportive therapies     Scrotal/perineal abscesses  Urology following  S/p I&D on 4/22/21  Blood cultures from 4/21/21 positive for micrococcus luteus (2/2). Repeat blood cx remained negative. Surgical cultures were positive for micrococcus luteus, finegoldia magna, prevotella melaninogenica, lactobacillus species. Adjust abx per ID rec  Local wound care as instructed  [facility] consult appreciated. Will need MRI and will need OP f/u with Dr. Isis Valerio regarding urethral pathology     Hx of urinary retention  Urology following  S/p sp catheter placed on prior admissions     Bacteremia  ID following. Appreciate assistance. Blood cx + for Micrococcus luteus   Repeat blood cx NGTD  IV vancomycin, cefepime, flagyl  Adjust abx pe ID rec     Pain management  Continue current pain regimen  Initiate/titrate pain medications as appropriate/as necessary  Decrease pain medications as pain improves/as able   Encourage use of PO pain medications prior to IV pain medications when possible.     Anxiety  Continue medications as ordered  Psych following. Appreciate assistance. Supportive therapies     Obesity, Body mass index is 32.17 kg/m².   [x]? Class I - 30.0 to 34.9 kg/m2  []? Class II - 35.0 to 39.9 kg/m2  []? Class III - ?40 kg/m2 (also referred to as severe, extreme, morbid or massive obesity)   []? Super Obesity  - > 50% kg/m2  []? Super Super Obesity  - > 72% kg/m2  Complicating assessment and treatment. Obesity Places the patient at risk for multiple co-morbidities as well as early death and may be contributing to the patient's presentation. Supportive care. Encourage therapeutic lifestyle changes. Continue current regimen/therapies. Monitor. Adjust medical regimen as appropriate. Body mass index is 32.2 kg/m². The patient and / or the family were informed of the results of any tests, a time was given to answer questions, a plan was proposed and they agreed with plan.       DVT ppx: lovenox      Diet: DIET GENERAL;    Consults:  PHARMACY TO DOSE VANCOMYCIN  IP CONSULT TO INFECTIOUS DISEASES  IP CONSULT TO UROLOGY  IP CONSULT TO PSYCHIATRY    DISPO/placement plan: pending    Code Status: Full Code      SANTANA Youngblood - CONCEPCION  04/26/21

## 2021-04-26 NOTE — PROGRESS NOTES
Physical Therapy  Attempt Note  César Rodriguezr  [id number]    The pt out of the room at MRI. Will attempt back tomorrow as the pt is available. If pt. is D/C'd prior to next visit please refer back to last daily progress note for D/C status.   Electronically signed by Mayank Shah PT on 4/26/2021 at 2:44 PM

## 2021-04-26 NOTE — PLAN OF CARE
[phone] by Vaishali Yun RN  Outcome: Ongoing  4/25/2021 1004 by Denis Canseco RN  Outcome: Ongoing  Goal: Absence of physical injury  Description: Absence of physical injury  4/25/2021 2303 by Vaishali Yun RN  Outcome: Ongoing  4/25/2021 1004 by Denis Canseco RN  Outcome: Ongoing     Problem: Mood - Altered:  Goal: Mood stable  Description: Mood stable  4/25/2021 2303 by Vaishali Yun RN  Outcome: Ongoing  4/25/2021 1004 by Denis Canseco RN  Outcome: Ongoing  Goal: Absence of abusive behavior  Description: Absence of abusive behavior  4/25/2021 2303 by Vaishali Yun RN  Outcome: Ongoing  4/25/2021 1004 by Denis Canseco RN  Outcome: Ongoing  Goal: Verbalizations of feeling emotionally comfortable while being cared for will increase  Description: Verbalizations of feeling emotionally comfortable while being cared for will increase  4/25/2021 2303 by Vaishali Yun RN  Outcome: Ongoing  4/25/2021 1004 by Denis Canseco RN  Outcome: Ongoing     Problem: Psychomotor Activity - Altered:  Goal: Absence of psychomotor disturbance signs and symptoms  Description: Absence of psychomotor disturbance signs and symptoms  4/25/2021 2303 by Vaishali Yun RN  Outcome: Ongoing  4/25/2021 1004 by Denis Canseco RN  Outcome: Ongoing     Problem: Sensory Perception - Impaired:  Goal: Demonstrations of improved sensory functioning will increase  Description: Demonstrations of improved sensory functioning will increase  4/25/2021 2303 by Vaishali Yun RN  Outcome: Ongoing  4/25/2021 1004 by Denis Canseco RN  Outcome: Ongoing  Goal: Decrease in sensory misperception frequency  Description: Decrease in sensory misperception frequency  4/25/2021 2303 by Vaishali Yun RN  Outcome: Ongoing  4/25/2021 1004 by Denis Canseco RN  Outcome: Ongoing  Goal: Able to refrain from responding to false sensory perceptions  Description: Able to refrain from responding to false sensory perceptions  4/25/2021 2303 by Vaishali Yun RN  Outcome: chronic pain  4/25/2021 2303 by Vale Coffey RN  Outcome: Ongoing  4/25/2021 1004 by Katie Valladares RN  Outcome: Ongoing

## 2021-04-26 NOTE — PROGRESS NOTES
Clinical Pharmacy Note  Vancomycin Consult    Shahid Jim is a 53 y.o. male ordered Vancomycin for scrotal abscess; consult received from Dr. Shruthi Hawkins to manage therapy. Also receiving cefepime and metronidazole. Patient Active Problem List   Diagnosis    Anxiety    History of tobacco abuse    Discitis of lumbar region    Osteomyelitis of lumbar spine (HCC)    Weight loss, non-intentional    Prediabetes    Obesity due to excess calories with serious comorbidity    Obesity due to excess calories    Acute cystitis with hematuria    Urinary retention    Bilateral hydronephrosis    Acute midline low back pain    Morbid obesity due to excess calories (HCC)    Sepsis (HCC)    Tachycardia    Leukocytosis    Scrotal swelling    Bacteriuria    Hallucinations       Allergies:  Patient has no known allergies. Temp max:  Temp (24hrs), Av.3 °F (36.8 °C), Min:98.1 °F (36.7 °C), Max:98.8 °F (37.1 °C)      Recent Labs     21  0434 21  0539 21  0601   WBC 12.6* 9.8 8.8       Recent Labs     21  0434 21  0539 21  0601   BUN 14 14 13   CREATININE 0.6* 0.8* 0.7*         Intake/Output Summary (Last 24 hours) at 2021 1356  Last data filed at 2021 0749  Gross per 24 hour   Intake 1690 ml   Output 2775 ml   Net -1085 ml       Ht Readings from Last 1 Encounters:   21 6' (1.829 m)        Wt Readings from Last 1 Encounters:   21 237 lb 7 oz (107.7 kg)         Estimated Creatinine Clearance: 162 mL/min (A) (based on SCr of 0.7 mg/dL (L)). Assessment/Plan:  · Vancomycin day #5:  · Trough = 16.5 ug/mL  · Pt is currently in MRI. Will push dose to due time of 1400  · Will continue Vancomycin 1500 mg IV every 12 hours ordered    Thank you for the consult.    Adis Pak [facility]  2021 1:56 PM

## 2021-04-27 PROBLEM — N49.2 SCROTAL INFECTION: Status: ACTIVE | Noted: 2021-04-27

## 2021-04-27 LAB
ANION GAP SERPL CALCULATED.3IONS-SCNC: 5 MMOL/L (ref 3–16)
BASOPHILS ABSOLUTE: 0 K/UL (ref 0–0.2)
BASOPHILS RELATIVE PERCENT: 0.5 %
BLOOD CULTURE, ROUTINE: NORMAL
BUN BLDV-MCNC: 12 MG/DL (ref 7–20)
CALCIUM SERPL-MCNC: 8.7 MG/DL (ref 8.3–10.6)
CHLORIDE BLD-SCNC: 104 MMOL/L (ref 99–110)
CO2: 29 MMOL/L (ref 21–32)
CREAT SERPL-MCNC: 0.7 MG/DL (ref 0.9–1.3)
CULTURE, BLOOD 2: NORMAL
EOSINOPHILS ABSOLUTE: 0.3 K/UL (ref 0–0.6)
EOSINOPHILS RELATIVE PERCENT: 3.2 %
GFR AFRICAN AMERICAN: >60
GFR NON-AFRICAN AMERICAN: >60
GLUCOSE BLD-MCNC: 111 MG/DL (ref 70–99)
HCT VFR BLD CALC: 29.1 % (ref 40.5–52.5)
HEMOGLOBIN: 9.6 G/DL (ref 13.5–17.5)
LYMPHOCYTES ABSOLUTE: 1.7 K/UL (ref 1–5.1)
LYMPHOCYTES RELATIVE PERCENT: 18.1 %
MAGNESIUM: 1.9 MG/DL (ref 1.8–2.4)
MCH RBC QN AUTO: 26.7 PG (ref 26–34)
MCHC RBC AUTO-ENTMCNC: 33 G/DL (ref 31–36)
MCV RBC AUTO: 80.8 FL (ref 80–100)
MONOCYTES ABSOLUTE: 0.5 K/UL (ref 0–1.3)
MONOCYTES RELATIVE PERCENT: 5.5 %
NEUTROPHILS ABSOLUTE: 6.7 K/UL (ref 1.7–7.7)
NEUTROPHILS RELATIVE PERCENT: 72.7 %
PDW BLD-RTO: 13 % (ref 12.4–15.4)
PLATELET # BLD: 324 K/UL (ref 135–450)
PMV BLD AUTO: 8.6 FL (ref 5–10.5)
POTASSIUM SERPL-SCNC: 4.2 MMOL/L (ref 3.5–5.1)
RBC # BLD: 3.6 M/UL (ref 4.2–5.9)
SODIUM BLD-SCNC: 138 MMOL/L (ref 136–145)
WBC # BLD: 9.2 K/UL (ref 4–11)

## 2021-04-27 PROCEDURE — 85025 COMPLETE CBC W/AUTO DIFF WBC: CPT

## 2021-04-27 PROCEDURE — 2580000003 HC RX 258: Performed by: INTERNAL MEDICINE

## 2021-04-27 PROCEDURE — 6370000000 HC RX 637 (ALT 250 FOR IP): Performed by: NURSE PRACTITIONER

## 2021-04-27 PROCEDURE — 6360000002 HC RX W HCPCS: Performed by: HOSPITALIST

## 2021-04-27 PROCEDURE — 2500000003 HC RX 250 WO HCPCS: Performed by: INTERNAL MEDICINE

## 2021-04-27 PROCEDURE — 2580000003 HC RX 258: Performed by: UROLOGY

## 2021-04-27 PROCEDURE — 6360000002 HC RX W HCPCS: Performed by: UROLOGY

## 2021-04-27 PROCEDURE — 6370000000 HC RX 637 (ALT 250 FOR IP): Performed by: HOSPITALIST

## 2021-04-27 PROCEDURE — 36592 COLLECT BLOOD FROM PICC: CPT

## 2021-04-27 PROCEDURE — 99233 SBSQ HOSP IP/OBS HIGH 50: CPT | Performed by: INTERNAL MEDICINE

## 2021-04-27 PROCEDURE — 83735 ASSAY OF MAGNESIUM: CPT

## 2021-04-27 PROCEDURE — 1200000000 HC SEMI PRIVATE

## 2021-04-27 PROCEDURE — 80048 BASIC METABOLIC PNL TOTAL CA: CPT

## 2021-04-27 RX ORDER — METHYLPREDNISOLONE SODIUM SUCCINATE 125 MG/2ML
125 INJECTION, POWDER, LYOPHILIZED, FOR SOLUTION INTRAMUSCULAR; INTRAVENOUS ONCE
Status: CANCELLED | OUTPATIENT
Start: 2021-04-29 | End: 2021-04-29

## 2021-04-27 RX ORDER — SODIUM CHLORIDE 9 MG/ML
25 INJECTION, SOLUTION INTRAVENOUS PRN
Status: CANCELLED | OUTPATIENT
Start: 2021-04-29

## 2021-04-27 RX ORDER — DIPHENHYDRAMINE HYDROCHLORIDE 50 MG/ML
50 INJECTION INTRAMUSCULAR; INTRAVENOUS ONCE
Status: CANCELLED | OUTPATIENT
Start: 2021-04-29 | End: 2021-04-29

## 2021-04-27 RX ORDER — EPINEPHRINE 1 MG/ML
0.3 INJECTION, SOLUTION, CONCENTRATE INTRAVENOUS PRN
Status: CANCELLED | OUTPATIENT
Start: 2021-04-29

## 2021-04-27 RX ORDER — OXYCODONE HYDROCHLORIDE AND ACETAMINOPHEN 5; 325 MG/1; MG/1
1 TABLET ORAL EVERY 4 HOURS PRN
Qty: 20 TABLET | Refills: 0 | Status: SHIPPED | OUTPATIENT
Start: 2021-04-27 | End: 2021-04-30

## 2021-04-27 RX ORDER — POLYETHYLENE GLYCOL 3350 17 G/17G
17 POWDER, FOR SOLUTION ORAL DAILY
Status: DISCONTINUED | OUTPATIENT
Start: 2021-04-27 | End: 2021-04-28 | Stop reason: HOSPADM

## 2021-04-27 RX ORDER — SODIUM CHLORIDE 9 MG/ML
INJECTION, SOLUTION INTRAVENOUS CONTINUOUS
Status: CANCELLED | OUTPATIENT
Start: 2021-04-29

## 2021-04-27 RX ORDER — HEPARIN SODIUM (PORCINE) LOCK FLUSH IV SOLN 100 UNIT/ML 100 UNIT/ML
500 SOLUTION INTRAVENOUS PRN
Status: CANCELLED | OUTPATIENT
Start: 2021-04-29

## 2021-04-27 RX ORDER — SODIUM CHLORIDE 0.9 % (FLUSH) 0.9 %
5-40 SYRINGE (ML) INJECTION PRN
Status: CANCELLED | OUTPATIENT
Start: 2021-04-29

## 2021-04-27 RX ORDER — SENNA AND DOCUSATE SODIUM 50; 8.6 MG/1; MG/1
2 TABLET, FILM COATED ORAL DAILY PRN
Status: DISCONTINUED | OUTPATIENT
Start: 2021-04-27 | End: 2021-04-28 | Stop reason: HOSPADM

## 2021-04-27 RX ADMIN — SODIUM CHLORIDE, PRESERVATIVE FREE 10 ML: 5 INJECTION INTRAVENOUS at 08:12

## 2021-04-27 RX ADMIN — QUETIAPINE FUMARATE 25 MG: 25 TABLET ORAL at 08:12

## 2021-04-27 RX ADMIN — METRONIDAZOLE 500 MG: 500 INJECTION, SOLUTION INTRAVENOUS at 08:56

## 2021-04-27 RX ADMIN — CEFEPIME HYDROCHLORIDE 2000 MG: 2 INJECTION, POWDER, FOR SOLUTION INTRAVENOUS at 20:43

## 2021-04-27 RX ADMIN — METRONIDAZOLE 500 MG: 500 INJECTION, SOLUTION INTRAVENOUS at 16:54

## 2021-04-27 RX ADMIN — Medication 1500 MG: at 03:06

## 2021-04-27 RX ADMIN — CEFEPIME HYDROCHLORIDE 2000 MG: 2 INJECTION, POWDER, FOR SOLUTION INTRAVENOUS at 08:13

## 2021-04-27 RX ADMIN — POLYETHYLENE GLYCOL 3350 17 G: 17 POWDER, FOR SOLUTION ORAL at 14:13

## 2021-04-27 RX ADMIN — ENOXAPARIN SODIUM 40 MG: 40 INJECTION SUBCUTANEOUS at 08:13

## 2021-04-27 RX ADMIN — QUETIAPINE FUMARATE 25 MG: 25 TABLET ORAL at 20:43

## 2021-04-27 RX ADMIN — METRONIDAZOLE 500 MG: 500 INJECTION, SOLUTION INTRAVENOUS at 01:27

## 2021-04-27 RX ADMIN — ESCITALOPRAM OXALATE 20 MG: 10 TABLET ORAL at 08:12

## 2021-04-27 RX ADMIN — MORPHINE SULFATE 2 MG: 2 INJECTION, SOLUTION INTRAMUSCULAR; INTRAVENOUS at 08:13

## 2021-04-27 ASSESSMENT — PAIN DESCRIPTION - DESCRIPTORS: DESCRIPTORS: ACHING;DISCOMFORT

## 2021-04-27 ASSESSMENT — PAIN SCALES - GENERAL: PAINLEVEL_OUTOF10: 7

## 2021-04-27 ASSESSMENT — PAIN DESCRIPTION - PAIN TYPE: TYPE: ACUTE PAIN;SURGICAL PAIN

## 2021-04-27 NOTE — PROGRESS NOTES
PICC PLACEMENT:  Preprocedure & timeout done w primary RN [facility]; no difficulty accessing R BASILIC vein; picc tip is verified using [employer]; Max pwave visualized at 1 cm out, confirming optimal tip positioning; waveform placed in chart; reported same and ok to use PICC to primary RN; pt tolerated procedure very well; pt is instructed to remain in bed at rest in order to promote hemostasis to the insertion site, and he agrees to do so; pt is left in a position of comfort w siderails up x2, call light in reach and bed is locked in lowest position; Order for OK to use PICC is placed in EMR    NURSES:  *please replace all existing IV tubing with new IV tubing prior to using the PICC for current IV infusions. *please remove any PIVs from RIGHT arm. *please refrain from obtaining BPs in the RIGHT arm. All of the above may be sources of infection or damage to the PICC line/site.     JOSÉ LUIS Samaniego RN, BSN, [facility].

## 2021-04-27 NOTE — PROGRESS NOTES
IV antibiotics per Dr. Dinora Molina. Cont IV antbx overnight here for now and will need outpatient infusion center orders on discharge since he does not have coverage for home health care. Adding bowel regimen     Review of Systems:     The patient denied headaches, visual changes, LOC, SOB, CP, ABD pain, N/V/D, skin changes, new or worsening weakness or neuromuscular deficits. Comprehensive ROS negative except as mentioned above. Past Medical History:        Diagnosis Date    Anxiety     was on paxil in the past    Elevated LFTs     History of tobacco abuse     Hypertension     Prediabetes        Past Surgical History:        Procedure Laterality Date    CYSTOSCOPY N/A 3/29/2021    CYSTOSCOPY,  SUPRAPUBIC TUBE INSERTION performed by Kaushal Johnson MD at [address] [facility] Right 4/22/2021    INCISION AND DRAINAGE SCROTAL ABSCESS, CYSTOSCOPY, EXCHANGE OF SUPRA PUBIC CATHETER performed by Kaushal Johnson MD at [address] [place] [address]:  Patient has no known allergies. Past medical and surgical history reviewed. Any changes have been noted. PHYSICAL EXAM:  /89   Pulse 65   Temp 98.3 °F (36.8 °C) (Oral)   Resp 18   Ht 6' (1.829 m)   Wt 237 lb 14 oz (107.9 kg)   SpO2 97%   BMI 32.26 kg/m²       Intake/Output Summary (Last 24 hours) at 4/27/2021 0930  Last data filed at 4/27/2021 7311  Gross per 24 hour   Intake 630 ml   Output 2600 ml   Net -1970 ml        General appearance:   No apparent distress, appears stated age. Cooperative. HEENT:  Normocephalic, atraumatic. PERRLA. EOMi. Conjunctivae/corneas clear, no icterus, non-injected. Neck: Supple, with full range of motion. No jugular venous distention. Trachea midline. Respiratory:  Normal respiratory effort. Clear to auscultation, bilaterally without Rales/Wheezes/Rhonchi. Cardiovascular:  Regular rate and rhythm without murmurs, rubs or gallops.   Abdomen: Soft, non-tender, non-distended, without rebound or guarding. Normal bowel sounds. Musculoskeletal:  No clubbing, cyanosis or edema bilaterally. Full range of motion without deformity. Skin: Skin color, texture, turgor normal.  No rashes or lesions. : suprapubic catheter in place and dressing intact. Duran catheter bag with clear yellow urine. Dressing on scrotum with shadow drainage. Neurologic:  Neurovascularly intact without any focal sensory/motor deficits. Cranial nerves: II-XII intact, grossly intact. No facial asymmetry, tongue midline. Psychiatric:  Alert and oriented, thought content appropriate  Capillary Refill: Brisk,< 3 seconds   Peripheral Pulses: +2 palpable, equal bilaterally       LABS:    Lab Results   Component Value Date    WBC 9.2 04/27/2021    HGB 9.6 (L) 04/27/2021    HCT 29.1 (L) 04/27/2021    MCV 80.8 04/27/2021     04/27/2021    LYMPHOPCT 18.1 04/27/2021    RBC 3.60 (L) 04/27/2021    MCH 26.7 04/27/2021    MCHC 33.0 04/27/2021    RDW 13.0 04/27/2021       Lab Results   Component Value Date    CREATININE 0.7 (L) 04/27/2021    BUN 12 04/27/2021     04/27/2021    K 4.2 04/27/2021     04/27/2021    CO2 29 04/27/2021       Lab Results   Component Value Date    MG 1.90 04/27/2021       Lab Results   Component Value Date    ALT 19 04/21/2021    AST 18 04/21/2021    ALKPHOS 97 04/21/2021    BILITOT 0.4 04/21/2021        No flowsheet data found. Lab Results   Component Value Date    LABA1C 5.8 12/09/2020       Imaging:  MRI PELVIS W WO CONTRAST   Preliminary Result   1. Diffuse soft tissue edema and enhancement suggestive of cellulitis   involving the scrotum and surrounding soft tissues including the majority of   the penis. Most prominent penile involvement is noted posteriorly versus   anteriorly. Large soft tissue defect anterior/inferiorly involving the   midline scrotum, as described above. 2. There is significant circumferential urinary bladder wall thickening which   may be infectious/inflammatory.
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
## 2021-04-27 NOTE — PROGRESS NOTES
0.7* 0.7*   MG 1.90 2.00 1.90   CALCIUM 8.9 8.8 8.7     MRI pelvis w/wo contrast 4/26/21  Impression   1. Diffuse soft tissue edema and enhancement suggestive of cellulitis   involving the scrotum and surrounding soft tissues including the majority of   the penis.  Most prominent penile involvement is noted posteriorly versus   anteriorly.  Large soft tissue defect anterior/inferiorly involving the   midline scrotum, as described above. 2. There is significant circumferential urinary bladder wall thickening which   may be infectious/inflammatory.  Suprapubic catheter is in place. 3. Prominent bilateral inguinal and external iliac lymph nodes, likely   reactive. ASSESSMENT   1. Hospital day # 5  2. 50y. o. male with Perineal abscess. S/p I&D, attempted cystoscopy and suprapubic exchange with Dr. Ely Abdullahi 4/22/21. 3. Urine culture NGTD. 4. MRI pelvis 4/26/21  PLAN   1. Wound care and I did dressing change today with Aquacel AG, now will be every 3 day dressing changes with home health and wound care recommended coming to wound clinic once weekly. 2. Continue antibiotics. 3. MRI and F/u with Dr. Akila Guerrero regarding urethral pathology. Will try to get MRI pelvis today. 4. Ok to DC home from  standpoint.      SANTANA Nevarez - CNP 4/27/2021 9:03 AM

## 2021-04-27 NOTE — PLAN OF CARE
of physical injury  Description: Absence of physical injury  4/27/2021 0955 by Mary Anne Bonilla RN  Outcome: Ongoing     Problem: Mood - Altered:  Goal: Mood stable  Description: Mood stable  4/27/2021 0955 by Mary Anne Bonilla RN  Outcome: Ongoing     Problem: Mood - Altered:  Goal: Absence of abusive behavior  Description: Absence of abusive behavior  4/27/2021 0955 by Mary Anne Bonilla RN  Outcome: Ongoing     Problem: Mood - Altered:  Goal: Verbalizations of feeling emotionally comfortable while being cared for will increase  Description: Verbalizations of feeling emotionally comfortable while being cared for will increase  4/27/2021 0955 by Mary Anne Bonilla RN  Outcome: Ongoing     Problem: Psychomotor Activity - Altered:  Goal: Absence of psychomotor disturbance signs and symptoms  Description: Absence of psychomotor disturbance signs and symptoms  4/27/2021 0955 by Mary Anne Bonilla RN  Outcome: Ongoing     Problem: Pain:  Goal: Pain level will decrease  Description: Pain level will decrease  4/27/2021 0955 by Mary Anne Bonilla RN  Outcome: Ongoing     Problem: Pain:  Goal: Control of acute pain  Description: Control of acute pain  4/27/2021 0955 by Mary Anne Bonilla RN  Outcome: Ongoing     Problem: Pain:  Goal: Control of chronic pain  Description: Control of chronic pain  4/27/2021 0955 by Mary Anne Bonilla RN  Outcome: Ongoing

## 2021-04-27 NOTE — DISCHARGE INSTR - COC
serious comorbidity E66.09    Obesity due to excess calories E66.09    Acute cystitis with hematuria N30.01    Urinary retention R33.9    Bilateral hydronephrosis N13.30    Acute midline low back pain M54.5    Morbid obesity due to excess calories (HCC) E66.01    Sepsis (HCC) A41.9    Tachycardia R00.0    Leukocytosis D72.829    Scrotal swelling N50.89    Bacteriuria R82.71    Hallucinations R44.3       Isolation/Infection:   Isolation            No Isolation          Patient Infection Status       Infection Onset Added Last Indicated Last Indicated By Review Planned Expiration Resolved Resolved By    None active    Resolved    COVID-19 Rule Out 04/22/21 04/22/21 04/22/21 COVID-19, Rapid (Ordered)   04/22/21 Rule-Out Test Resulted            Nurse Assessment:  Last Vital Signs: /89   Pulse 65   Temp 98.3 °F (36.8 °C) (Oral)   Resp 18   Ht 6' (1.829 m)   Wt 237 lb 14 oz (107.9 kg)   SpO2 97%   BMI 32.26 kg/m²     Last documented pain score (0-10 scale): Pain Level: 3  Last Weight:   Wt Readings from Last 1 Encounters:   04/27/21 237 lb 14 oz (107.9 kg)     Mental Status:  oriented and alert    IV Access:  PICC R UPPER arm, 04/26/2021    Nursing Mobility/ADLs:  Walking   Assisted  Transfer  Assisted  Bathing  Assisted  Dressing  Assisted  Toileting  Assisted  Feeding  Assisted  Med Admin  Assisted  Med Delivery   none    Wound Care Documentation and Therapy:   Scrotal wound - remove packing, cleanse with saline. Pack with aquacel silver, cover with ABD pad and secure with mesh underwear. Change every 3 days. If needed, may use saline to moisten packing prior to removal.     Elimination:  Continence:   · Bowel:  Yes  · Bladder: Yes  Urinary Catheter: None   Colostomy/Ileostomy/Ileal Conduit: No       Date of Last BM: 04/25/2021    Intake/Output Summary (Last 24 hours) at 4/27/2021 0909  Last data filed at 4/27/2021 4430  Gross per 24 hour   Intake 630 ml   Output 2600 ml   Net -1970 ml     I/O last 3 completed shifts: In: 930 [P.O.:780; IV Piggyback:150]  Out: 2600 [Urine:2600]    Safety Concerns:     None    Impairments/Disabilities:      None    Nutrition Therapy:  Current Nutrition Therapy:   - Oral Diet:  General    Routes of Feeding: Oral  Liquids: No Restrictions  Daily Fluid Restriction: no  Last Modified Barium Swallow with Video (Video Swallowing Test): not done    Treatments at the Time of Hospital Discharge:   Respiratory Treatments: none  Oxygen Therapy:  is not on home oxygen therapy. Ventilator:    - No ventilator support    Rehab Therapies: Physical Therapy and Occupational Therapy  Weight Bearing Status/Restrictions: No weight bearing restirctions  Other Medical Equipment (for information only, NOT a DME order):   Wound care  Other Treatments: -aquacel ag packing every 3 days with ABD and mesh underwear    Patient's personal belongings (please select all that are sent with patient):  None    RN SIGNATURE:  Electronically signed by Jenette Phalen, RN on 4/28/21 at 2:38 PM EDT    CASE MANAGEMENT/SOCIAL WORK SECTION    Inpatient Status Date: ***    Readmission Risk Assessment Score:  Readmission Risk              Risk of Unplanned Readmission:        15           Discharging to Facility/ Agency   · [facility]: [facility]  · Address:[address] [place] [address]  · Phone:[phone]    Wound care weekly at [address] [facility] [address], 1st appt tues May 4 at 845 am  Daily IV abx at infusion center starting 130 4/29    / signature: Electronically signed by [id number] DAY Mujica on 4/28/21 at 10:20 AM EDT    PHYSICIAN SECTION    Prognosis: Good    Condition at Discharge: Stable    Rehab Potential (if transferring to Rehab): Good    Recommended Labs or Other Treatments After Discharge:   Ertapenem x 1 gm x Q 24 hr x stop date 5/12  Oral Flagyl x 500 mg x Q 8 hr x 7 days  First dose given in Hospital;  CBC with diff, BMP, ESR, CRP weekly  Fax results to  [phone]

## 2021-04-27 NOTE — CARE COORDINATION
Pressure Redistribution  [] Fluid Immersion  [] Bariatric  [] Total Pressure Relief  [] Other:     Current Diet: DIET GENERAL;  Dietician consult:  No    Discharge Plan:  Placement for patient upon discharge: home with support   Patient appropriate for Outpatient [address]: Yes-# added to [address] [place] [address] [place]    Referrals:  [x]   [x] [facility]  [] Supplies  [] Other    Patient/Caregiver Teaching:  Level of patient/caregiver understanding able to:   [] Indicates understanding       [x] Needs reinforcement  [] Unsuccessful      [x] Verbal Understanding  [] Demonstrated understanding       [] No evidence of learning  [] Refused teaching         [] N/A       Electronically signed by Anabel Denver, CWOCN on 4/27/2021 at 9:03 AM

## 2021-04-27 NOTE — PLAN OF CARE
symptoms  Description: Absence of psychomotor disturbance signs and symptoms  Outcome: Ongoing     Problem: Sensory Perception - Impaired:  Goal: Demonstrations of improved sensory functioning will increase  Description: Demonstrations of improved sensory functioning will increase  Outcome: Ongoing  Goal: Decrease in sensory misperception frequency  Description: Decrease in sensory misperception frequency  Outcome: Ongoing  Goal: Able to refrain from responding to false sensory perceptions  Description: Able to refrain from responding to false sensory perceptions  Outcome: Ongoing  Goal: Demonstrates accurate environmental perceptions  Description: Demonstrates accurate environmental perceptions  Outcome: Ongoing  Goal: Able to distinguish between reality-based and nonreality-based thinking  Description: Able to distinguish between reality-based and nonreality-based thinking  Outcome: Ongoing  Goal: Able to interrupt nonreality-based thinking  Description: Able to interrupt nonreality-based thinking  Outcome: Ongoing     Problem: Sleep Pattern Disturbance:  Goal: Appears well-rested  Description: Appears well-rested  Outcome: Ongoing     Problem: Pain:  Goal: Pain level will decrease  Description: Pain level will decrease  Outcome: Ongoing  Goal: Control of acute pain  Description: Control of acute pain  Outcome: Ongoing  Goal: Control of chronic pain  Description: Control of chronic pain  Outcome: Ongoing

## 2021-04-28 VITALS
DIASTOLIC BLOOD PRESSURE: 89 MMHG | SYSTOLIC BLOOD PRESSURE: 151 MMHG | HEART RATE: 68 BPM | OXYGEN SATURATION: 98 % | TEMPERATURE: 98.4 F | RESPIRATION RATE: 16 BRPM | WEIGHT: 237.22 LBS | BODY MASS INDEX: 32.13 KG/M2 | HEIGHT: 72 IN

## 2021-04-28 LAB
ANION GAP SERPL CALCULATED.3IONS-SCNC: 8 MMOL/L (ref 3–16)
BASOPHILS ABSOLUTE: 0.1 K/UL (ref 0–0.2)
BASOPHILS RELATIVE PERCENT: 0.7 %
BUN BLDV-MCNC: 13 MG/DL (ref 7–20)
CALCIUM SERPL-MCNC: 9.1 MG/DL (ref 8.3–10.6)
CHLORIDE BLD-SCNC: 105 MMOL/L (ref 99–110)
CO2: 27 MMOL/L (ref 21–32)
CREAT SERPL-MCNC: 0.7 MG/DL (ref 0.9–1.3)
EOSINOPHILS ABSOLUTE: 0.3 K/UL (ref 0–0.6)
EOSINOPHILS RELATIVE PERCENT: 3.5 %
GFR AFRICAN AMERICAN: >60
GFR NON-AFRICAN AMERICAN: >60
GLUCOSE BLD-MCNC: 97 MG/DL (ref 70–99)
HCT VFR BLD CALC: 29.8 % (ref 40.5–52.5)
HEMOGLOBIN: 9.9 G/DL (ref 13.5–17.5)
LYMPHOCYTES ABSOLUTE: 1.8 K/UL (ref 1–5.1)
LYMPHOCYTES RELATIVE PERCENT: 23.2 %
MAGNESIUM: 2.1 MG/DL (ref 1.8–2.4)
MCH RBC QN AUTO: 27.1 PG (ref 26–34)
MCHC RBC AUTO-ENTMCNC: 33.1 G/DL (ref 31–36)
MCV RBC AUTO: 81.7 FL (ref 80–100)
MONOCYTES ABSOLUTE: 0.5 K/UL (ref 0–1.3)
MONOCYTES RELATIVE PERCENT: 5.9 %
NEUTROPHILS ABSOLUTE: 5.1 K/UL (ref 1.7–7.7)
NEUTROPHILS RELATIVE PERCENT: 66.7 %
PDW BLD-RTO: 13 % (ref 12.4–15.4)
PLATELET # BLD: 323 K/UL (ref 135–450)
PMV BLD AUTO: 8.5 FL (ref 5–10.5)
POTASSIUM SERPL-SCNC: 4.2 MMOL/L (ref 3.5–5.1)
RBC # BLD: 3.65 M/UL (ref 4.2–5.9)
SODIUM BLD-SCNC: 140 MMOL/L (ref 136–145)
WBC # BLD: 7.7 K/UL (ref 4–11)

## 2021-04-28 PROCEDURE — 97530 THERAPEUTIC ACTIVITIES: CPT

## 2021-04-28 PROCEDURE — 6370000000 HC RX 637 (ALT 250 FOR IP): Performed by: HOSPITALIST

## 2021-04-28 PROCEDURE — 99232 SBSQ HOSP IP/OBS MODERATE 35: CPT | Performed by: INTERNAL MEDICINE

## 2021-04-28 PROCEDURE — 6370000000 HC RX 637 (ALT 250 FOR IP): Performed by: NURSE PRACTITIONER

## 2021-04-28 PROCEDURE — 6360000002 HC RX W HCPCS: Performed by: HOSPITALIST

## 2021-04-28 PROCEDURE — 85025 COMPLETE CBC W/AUTO DIFF WBC: CPT

## 2021-04-28 PROCEDURE — 2580000003 HC RX 258: Performed by: INTERNAL MEDICINE

## 2021-04-28 PROCEDURE — 2500000003 HC RX 250 WO HCPCS: Performed by: INTERNAL MEDICINE

## 2021-04-28 PROCEDURE — 97535 SELF CARE MNGMENT TRAINING: CPT

## 2021-04-28 PROCEDURE — 6360000002 HC RX W HCPCS: Performed by: INTERNAL MEDICINE

## 2021-04-28 PROCEDURE — 94761 N-INVAS EAR/PLS OXIMETRY MLT: CPT

## 2021-04-28 PROCEDURE — 80048 BASIC METABOLIC PNL TOTAL CA: CPT

## 2021-04-28 PROCEDURE — 6360000002 HC RX W HCPCS: Performed by: UROLOGY

## 2021-04-28 PROCEDURE — 83735 ASSAY OF MAGNESIUM: CPT

## 2021-04-28 RX ORDER — ESCITALOPRAM OXALATE 20 MG/1
20 TABLET ORAL DAILY
Qty: 30 TABLET | Refills: 1 | Status: SHIPPED | OUTPATIENT
Start: 2021-04-28 | End: 2021-08-03 | Stop reason: SDUPTHER

## 2021-04-28 RX ORDER — METRONIDAZOLE 500 MG/1
500 TABLET ORAL 3 TIMES DAILY
Qty: 21 TABLET | Refills: 0 | Status: SHIPPED | OUTPATIENT
Start: 2021-04-28 | End: 2021-05-05

## 2021-04-28 RX ORDER — QUETIAPINE FUMARATE 25 MG/1
25 TABLET, FILM COATED ORAL 2 TIMES DAILY
Qty: 28 TABLET | Refills: 0 | Status: SHIPPED | OUTPATIENT
Start: 2021-04-28 | End: 2022-02-08

## 2021-04-28 RX ADMIN — METRONIDAZOLE 500 MG: 500 INJECTION, SOLUTION INTRAVENOUS at 08:40

## 2021-04-28 RX ADMIN — METRONIDAZOLE 500 MG: 500 INJECTION, SOLUTION INTRAVENOUS at 00:29

## 2021-04-28 RX ADMIN — ENOXAPARIN SODIUM 40 MG: 40 INJECTION SUBCUTANEOUS at 08:40

## 2021-04-28 RX ADMIN — MORPHINE SULFATE 2 MG: 2 INJECTION, SOLUTION INTRAMUSCULAR; INTRAVENOUS at 15:56

## 2021-04-28 RX ADMIN — POLYETHYLENE GLYCOL 3350 17 G: 17 POWDER, FOR SOLUTION ORAL at 08:40

## 2021-04-28 RX ADMIN — SODIUM CHLORIDE, PRESERVATIVE FREE 10 ML: 5 INJECTION INTRAVENOUS at 08:40

## 2021-04-28 RX ADMIN — ERTAPENEM SODIUM 1000 MG: 1 INJECTION, POWDER, LYOPHILIZED, FOR SOLUTION INTRAMUSCULAR; INTRAVENOUS at 13:57

## 2021-04-28 RX ADMIN — ESCITALOPRAM OXALATE 20 MG: 10 TABLET ORAL at 08:40

## 2021-04-28 RX ADMIN — QUETIAPINE FUMARATE 25 MG: 25 TABLET ORAL at 08:40

## 2021-04-28 ASSESSMENT — PAIN SCALES - GENERAL: PAINLEVEL_OUTOF10: 0
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
## 2021-04-28 NOTE — PROGRESS NOTES
CLINICAL PHARMACY NOTE: MEDS TO [address] Select Patient?: No  Total # of Prescriptions Filled: 3   The following medications were delivered to the patient:  Current Discharge Medication List      START taking these medications    Details   metroNIDAZOLE (FLAGYL) 500 MG tablet Take 1 tablet by mouth 3 times daily for 7 days  Qty: 21 tablet, Refills: 0      QUEtiapine (SEROQUEL) 25 MG tablet Take 1 tablet by mouth 2 times daily for 14 days  Qty: 28 tablet, Refills: 0      oxyCODONE-acetaminophen (PERCOCET) 5-325 MG per tablet Take 1 tablet by mouth every 4 hours as needed for Pain for up to 3 days.   Qty: 20 tablet, Refills: 0    Comments: Reduce doses taken as pain becomes manageable  Associated Diagnoses: Scrotal infection         ·   Total # of Interventions Completed: 0  Time Spent (min): 30    Additional Documentation:

## 2021-04-28 NOTE — PLAN OF CARE
Problem: Falls - Risk of:  Goal: Will remain free from falls  Description: Will remain free from falls  4/28/2021 1009 by Mariah Aguilar RN  Outcome: Ongoing     Problem: Falls - Risk of:  Goal: Absence of physical injury  Description: Absence of physical injury  4/28/2021 1009 by Mariah Aguilar RN  Outcome: Ongoing     Problem: Skin Integrity:  Goal: Will show no infection signs and symptoms  Description: Will show no infection signs and symptoms  4/28/2021 1009 by Mariah Aguilar RN  Outcome: Ongoing     Problem: Skin Integrity:  Goal: Absence of new skin breakdown  Description: Absence of new skin breakdown  4/28/2021 1009 by Mariah Aguilar RN  Outcome: Ongoing     Problem: Confusion - Acute:  Goal: Mental status will be restored to baseline  Description: Mental status will be restored to baseline  Outcome: Ongoing     Problem: Confusion - Acute:  Goal: Absence of continued neurological deterioration signs and symptoms  Description: Absence of continued neurological deterioration signs and symptoms  Outcome: Ongoing     Problem: Discharge Planning:  Goal: Participates in care planning  Description: Participates in care planning  Outcome: Ongoing     Problem: Discharge Planning:  Goal: Discharged to appropriate level of care  Description: Discharged to appropriate level of care  Outcome: Ongoing     Problem: Injury - Risk of, Physical Injury:  Goal: Will remain free from falls  Description: Will remain free from falls  4/28/2021 1009 by Mariah Aguilar RN  Outcome: Ongoing     Problem: Injury - Risk of, Physical Injury:  Goal: Absence of physical injury  Description: Absence of physical injury  4/28/2021 1009 by Mariah Aguilar RN  Outcome: Ongoing
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
## 2021-04-28 NOTE — DISCHARGE SUMMARY
Hospital Medicine Discharge Summary    Patient ID: Jani Marvin      Patient's PCP: Patric Zheng MD    Admit Date: 4/21/2021     Discharge Date:   4/28/21    Admitting Physician: Devan Melgoza DO     Discharge Physician: Andrea Figueroa APRN - CNP       Discharge Diagnoses: Active Hospital Problems    Diagnosis Date Noted    Scrotal infection [N49.2] 04/27/2021    Scrotal swelling [N50.89] 04/22/2021    Bacteriuria [R82.71] 04/22/2021    Hallucinations [R44.3] 04/22/2021    Leukocytosis [D72.829] 03/29/2021       The patient was seen and examined on day of discharge and this discharge summary is in conjunction with any daily progress note from day of discharge. Disposition:  [x] Home  [] Home with home health [] Rehab [] Psych [] SNF  [] LTAC  [] Long term nursing home or group home [] Transfer to ICU  [] Transfer to PCU [] Other:       Discharge Instructions/Follow-up:  Wound clinic as needed, change wound dressings  To scrotal area with silver dressing (aquacel ag ) and ABD pad every 3 days     Dr. Akila Guerrero 1 wk for MRI and f/u     PCP/SNF to follow up: PCP within a week    D/C condition: stable    Code status: full    Activity: ad raghav    Diet:  low fat    D/C Meds: IV Ertapenem 1g daily x14 days at infusion center with wound dressing changes every 3 days as in chart. PICC line + flagyl 500mg tid x7days oral    Lexapro, seroquel refilled    Hospital Course: The patient is a 53 yrs old male, who  has a past medical history of Anxiety, Elevated LFTs, History of tobacco abuse, Hypertension, and Prediabetes. The patient had been admitted the month prior to presentation  and was discharged on 1 April after being found to have concerning findings of bladder outlet obstruction and had a suprapubic catheter placed at the time and was also treated for sepsis possibly secondary due to a urinary tract infection as well as possible abscess of his penis.  He presented to [facility] ER after his significant other noted visual hallucinations, increased fatigue and degreased PO intake. The patient had been unable to f/u in person with urology and noted significant scrotal swelling. US of the scrotum was concerning for hydrocele v hematoma v hematocele. WBC was elevated. The patient was admitted for further workup and treatment. IV vancomycin and rocephin were initiated. Urology was consulted. Psychiatry was consulted. The patient was taken to the OR on 4/22/21 emergently for I&D of scrotal abscess/perineal abscess, cystoscopy and SP exchange. Infectious disease service was consulted for assistance with abx management.      Blood cultures from 4/21/21 positive for micrococcus luteus (2/2). Repeat blood cx remained negative. Surgical cultures were positive for micrococcus luteus, finegoldia magna, prevotella melaninogenica, lactobacillus species.      Acute metabolic encephalopathy/altered mental status - resolved  2/2 infection? Treat scrotal/perineal abscesses  - Urology following.  - S/p I&D  - ID following  - Abx per ID  - Psych following. Supportive therapies. Treat underlying cause.     Sepsis- resolved  Treat underlying cause  Supportive therapies     Scrotal/perineal abscesses  Urology following  S/p I&D on 4/22/21  Blood cultures from 4/21/21 positive for micrococcus luteus (2/2). Repeat blood cx remained negative. Surgical cultures were positive for micrococcus luteus, finegoldia magna, prevotella melaninogenica, lactobacillus species.    Adjust abx per ID rec  Local wound care as instructed  [facility] consult appreciated. Pelvic MRI   - f/u with Dr. Steve Boswell regarding urethral pathology  - cont ertapenem IV on discharge as in [address] with wound dressing changes q3d     Hx of urinary retention  Urology following  S/p sp catheter placed on prior admissions  Suprapubic catheter in place     Bacteremia  ID following. Appreciate assistance.    Blood cx + for Micrococcus luteus   Repeat blood cx NGTD  IV vancomycin, cefepime, flagyl  Adjust abx pe ID rec     Pain management  Continue current pain regimen  Initiate/titrate pain medications as appropriate/as necessary  Decrease pain medications as pain improves/as able   Encourage use of PO pain medications prior to IV pain medications when possible.     Anxiety  Continue medications as ordered  Psych following. Appreciate assistance. Supportive therapies     Obesity, Body mass index is 32.17 kg/m². [x]??? Class I - 30.0 to 34.9 kg/m2  []??? Class II - 35.0 to 39.9 kg/m2  []??? Class III - ?40 kg/m2 (also referred to as severe, extreme, morbid or massive obesity)   []??? Super Obesity  - > 50% kg/m2  []??? Super Super Obesity  - > 96% kg/m2  Complicating assessment and treatment.    Obesity Places the patient at risk for multiple co-morbidities as well as early death and may be contributing to the patient's presentation. Supportive care. Encourage therapeutic lifestyle changes.       Exam:     /86   Pulse 66   Temp 98.5 °F (36.9 °C) (Oral)   Resp 18   Ht 6' (1.829 m)   Wt 237 lb 3.4 oz (107.6 kg)   SpO2 97%   BMI 32.17 kg/m²   General appearance: No apparent distress, appears stated age and cooperative. HEENT: Pupils equal, round, and reactive to light. Conjunctivae/corneas clear. Neck: Supple, with full range of motion. No jugular venous distention. Trachea midline. Respiratory:  Normal respiratory effort. Clear to auscultation, bilaterally without Rales/Wheezes/Rhonchi. Cardiovascular: Regular rate and rhythm with normal S1/S2 without murmurs, rubs or gallops. Abdomen: Soft, non-tender, non-distended with normal bowel sounds. Musculoskelatal: No clubbing, cyanosis or edema bilaterally. Full range of motion without deformity. Skin: open wound with granulation tissue midline scrotum , edema and erythema to scrotal area. Neurologic:  Neurovascularly intact without any focal sensory/motor deficits.  Cranial nerves: II-XII intact, grossly non-focal.  Psychiatric: Alert and oriented, thought content appropriate, normal insight              Consults:     IP CONSULT TO INFECTIOUS DISEASES  IP CONSULT TO UROLOGY  IP CONSULT TO PSYCHIATRY  IP CONSULT TO HOME CARE NEEDS  IP CONSULT TO PHARMACY    Diagnostic tests:    Imaging:  MRI PELVIS W WO CONTRAST   Preliminary Result   1. Diffuse soft tissue edema and enhancement suggestive of cellulitis   involving the scrotum and surrounding soft tissues including the majority of   the penis. Most prominent penile involvement is noted posteriorly versus   anteriorly. Large soft tissue defect anterior/inferiorly involving the   midline scrotum, as described above. 2. There is significant circumferential urinary bladder wall thickening which   may be infectious/inflammatory. Suprapubic catheter is in place. 3. Prominent bilateral inguinal and external iliac lymph nodes, likely   reactive.           CT HEAD WO CONTRAST   Final Result   No acute intracranial abnormality.       Mild paranasal sinus disease           US SCROTUM AND TESTICLES   Final Result   1. Mild bilateral testicular hydrocele. 2. Heterogeneous generally hypoechoic region posterior to the inferior aspect   of the right testicle measuring 4.7 x 3.1 x 2.7 cm in dimension. Differential diagnostic considerations include scrotal hematocele versus   scrotal wall hematoma. Labs:  For convenience and continuity at follow-up the following most recent labs are provided:      CBC:    Lab Results   Component Value Date    WBC 7.7 04/28/2021    HGB 9.9 04/28/2021    HCT 29.8 04/28/2021     04/28/2021       Renal:    Lab Results   Component Value Date     04/28/2021    K 4.2 04/28/2021    K 4.1 04/21/2021     04/28/2021    CO2 27 04/28/2021    BUN 13 04/28/2021    CREATININE 0.7 04/28/2021    CALCIUM 9.1 04/28/2021    PHOS 3.9 11/10/2017       Discharge Medications:     Current Discharge Medication List           Details metroNIDAZOLE (FLAGYL) 500 MG tablet Take 1 tablet by mouth 3 times daily for 7 days  Qty: 21 tablet, Refills: 0      QUEtiapine (SEROQUEL) 25 MG tablet Take 1 tablet by mouth 2 times daily for 14 days  Qty: 28 tablet, Refills: 0      oxyCODONE-acetaminophen (PERCOCET) 5-325 MG per tablet Take 1 tablet by mouth every 4 hours as needed for Pain for up to 3 days. Qty: 20 tablet, Refills: 0    Comments: Reduce doses taken as pain becomes manageable  Associated Diagnoses: Scrotal infection              Details   escitalopram (LEXAPRO) 20 MG tablet Take 1 tablet by mouth daily  Qty: 30 tablet, Refills: 1    Associated Diagnoses: Anxiety             Time Spent on discharge is more than 45 minutes in the examination, evaluation, counseling and review of medications and discharge plan. Signed:    Casey Lopes, SANTANA - CNP   4/28/2021      Thank you Kristie Troy MD for the opportunity to be involved in this patient's care. If you have any questions or concerns please feel free to contact me at [phone].

## 2021-04-28 NOTE — PLAN OF CARE
Problem: Falls - Risk of:  Goal: Will remain free from falls  Description: Will remain free from falls  Outcome: Ongoing  Goal: Absence of physical injury  Description: Absence of physical injury  Outcome: Ongoing     Problem: Skin Integrity:  Goal: Will show no infection signs and symptoms  Description: Will show no infection signs and symptoms  Outcome: Ongoing  Goal: Absence of new skin breakdown  Description: Absence of new skin breakdown  Outcome: Ongoing     Problem: Injury - Risk of, Physical Injury:  Goal: Will remain free from falls  Description: Will remain free from falls  Outcome: Ongoing  Goal: Absence of physical injury  Description: Absence of physical injury  Outcome: Ongoing     Problem: Pain:  Goal: Pain level will decrease  Description: Pain level will decrease  Outcome: Ongoing

## 2021-04-28 NOTE — CARE COORDINATION
Met with pt. Probable discharge today. Will go home with [facility] for 1 visit to teach wife dressing change, then follow in wound clinic. Pt denies any questions re: dressing change at this time. His wife is not present to teach at this time. Pt has supplies for wound care. Orders are on ELLE for dressing changes.  Zhao Escamilla, MSN, RN, [employer]

## 2021-04-28 NOTE — CARE COORDINATION
4/28 Wound Care and Bedside nurse will teach patient and family how to perform wound care as we are unable to secure a [facility] to do a [facility] visit. The patient has wound care supplies and will follow up at the [address] [place] [address] next Tuesday. [facility] for [facility].  Electronically signed by Myesha Lott RN on 4/28/2021 at 12:02 PM

## 2021-04-28 NOTE — PROGRESS NOTES
Pt Name: Paul Olmstead  Medical Record Number: [id number]  Date of Birth 1970   Today's Date: 4/28/2021      Subjective:  Continues to improve. No new issues. Likely discharge today. ROS: Constitutional: No fever    Vitals:  Vitals:    04/28/21 0029 04/28/21 0416 04/28/21 0548 04/28/21 0848   BP: (!) 141/90 129/86     Pulse: 63 66     Resp: 18 18     Temp: 98.1 °F (36.7 °C) 98.5 °F (36.9 °C)     TempSrc: Oral Oral     SpO2: 96% 96%  97%   Weight:   237 lb 3.4 oz (107.6 kg)    Height:         I/O last 3 completed shifts: In: 360 [P.O.:360]  Out: 2325 [Urine:2325]    Exam:  General: Awake, oriented x 3, no acute distress  Respiratory: Nonlabored breathing  Abdomen: Soft, non-tender, non-distended, no masses  : SP tube intact with yellow output, Scrotum with Aquacel AG packing, changed yesterday. Skin: Skin color, texture, turgor normal, no rashes or lesions  Neurologic: no gross deficits    CURRENT MEDICATIONS   Scheduled Meds:   cefepime  2,000 mg Intravenous Q12H    ertapenem (INVanz) IVPB  1,000 mg Intravenous Once    polyethylene glycol  17 g Oral Daily    sodium chloride flush  5-40 mL Intravenous 2 times per day    escitalopram  20 mg Oral Daily    metroNIDAZOLE  500 mg Intravenous Q8H    enoxaparin  40 mg Subcutaneous Daily    QUEtiapine  25 mg Oral BID     Continuous Infusions:   sodium chloride       PRN Meds:.sennosides-docusate sodium, sodium chloride, morphine, oxyCODONE-acetaminophen, morphine, sodium chloride flush, acetaminophen **OR** acetaminophen    LABS     Recent Labs     04/26/21  0601 04/27/21  0525 04/28/21  0620   WBC 8.8 9.2 7.7   HGB 9.9* 9.6* 9.9*   HCT 29.8* 29.1* 29.8*    324 323    138 140   K 4.0 4.2 4.2    104 105   CO2 27 29 27   BUN 13 12 13   CREATININE 0.7* 0.7* 0.7*   MG 2.00 1.90 2.10   CALCIUM 8.8 8.7 9.1     MRI pelvis w/wo contrast 4/26/21  Impression   1.  Diffuse soft tissue edema and enhancement suggestive of cellulitis involving the scrotum and surrounding soft tissues including the majority of   the penis.  Most prominent penile involvement is noted posteriorly versus   anteriorly.  Large soft tissue defect anterior/inferiorly involving the   midline scrotum, as described above. 2. There is significant circumferential urinary bladder wall thickening which   may be infectious/inflammatory.  Suprapubic catheter is in place. 3. Prominent bilateral inguinal and external iliac lymph nodes, likely   reactive. ASSESSMENT   1. Hospital day # 6  2. 50y. o. male with Perineal abscess. S/p I&D, attempted cystoscopy and suprapubic exchange with Dr. Liam Walton 4/22/21. 3. Urine culture NGTD. 4. MRI pelvis 4/26/21  PLAN   1. Wound care and I did dressing change yesterday with Aquacel AG, now will be every 3 day dressing changes. Patient cannot get home health so has been referred to wound clinic for dressing changes  2. Continue antibiotics. 3. MRI and F/u with Dr. Karol Brower regarding urethral pathology. 4. Ok to MO home from  standpoint.      Caryle Look, APRN - CNP 4/28/2021 10:16 AM

## 2021-04-28 NOTE — PROGRESS NOTES
Wife at bedside, this RN and wound care RN changed and educated pt and wife on dressing change. All supplies given. All follow up information discussed, follow ups discussed, all meds in room from pharmacy. Duran bag changed to leg bag per pt request. PICC line still in place for outpt IV antibiotics. Will d/c.      Electronically signed by Esteban Sanders RN on 4/28/2021 at 5:40 PM

## 2021-04-28 NOTE — CARE COORDINATION
HORACIO recd call from urology reporting pt will need wound care at ID. SW met with pt at bedside. He feels he can change his dressings and his wife can be taught as well. Talked to pt re a sujey visit from [facility] and pt thinks that would be very helpful. Spoke with Debi Gómez, who agrees this is possible. Pt will have 1 visit from [facility] then will go to [facility] wound care clinic 1x/wk for follow up. Dr Panda Whitlock will follow pt. HORACIO spoke with Bryan at the wound care clinic and scheduled pt for Tues May 4th at 845 am.  Pt is agreeable to this. SW discussed other meds as well. He reports he has $ and can pay for meds depending on cost.  Scripts will be sent to [facility] OP pharmacy. Pt is already set up for OP IV infusion starting tomorrow 4/29 at 130 with Dr Aj Echavarria following. Electronically signed by [id number] DAY White on 4/28/2021 at 10:00 AM    ADDENDUM, spoke with RN, no home care able to do sujey home care visit.   Rn will teach pt prior to ID then follow up with wound care clinic  Electronically signed by [id number] DAY White on 4/28/2021 at 11:20 AM

## 2021-04-28 NOTE — CARE COORDINATION
[facility]  Unable to accept due to staffing. Spoke with SW, plan to teach patient before discharge and then patient to go to [facility] on Tuesday for follow up.  Prema Escobar LPN  CTN with  [facility],  [address] [place] [address], Fax [phone]

## 2021-04-28 NOTE — CARE COORDINATION
Wife to bedside. Instructed on dressing change. She watched procedure. Denies questions. Supplies given. Plan for her to change dressing over weekend and then follow up in wound clinic for next dressing change.  Anabell Perales, MSN, RN, [facility] Boswell

## 2021-04-29 ENCOUNTER — TELEPHONE (OUTPATIENT)
Dept: FAMILY MEDICINE CLINIC | Age: 51
End: 2021-04-29

## 2021-04-29 ENCOUNTER — HOSPITAL ENCOUNTER (OUTPATIENT)
Dept: INFUSION THERAPY | Age: 51
Setting detail: INFUSION SERIES
Discharge: HOME | End: 2021-04-29
Payer: MEDICAID

## 2021-04-29 VITALS
SYSTOLIC BLOOD PRESSURE: 111 MMHG | RESPIRATION RATE: 17 BRPM | OXYGEN SATURATION: 97 % | BODY MASS INDEX: 32.1 KG/M2 | WEIGHT: 237 LBS | HEIGHT: 72 IN | DIASTOLIC BLOOD PRESSURE: 65 MMHG | HEART RATE: 82 BPM | TEMPERATURE: 98.4 F

## 2021-04-29 DIAGNOSIS — N50.89 SCROTAL SWELLING: ICD-10-CM

## 2021-04-29 DIAGNOSIS — A41.9 SEPSIS WITHOUT ACUTE ORGAN DYSFUNCTION, DUE TO UNSPECIFIED ORGANISM (HCC): ICD-10-CM

## 2021-04-29 DIAGNOSIS — N49.2 SCROTAL INFECTION: Primary | ICD-10-CM

## 2021-04-29 PROCEDURE — 2580000003 HC RX 258: Performed by: INTERNAL MEDICINE

## 2021-04-29 PROCEDURE — 6360000002 HC RX W HCPCS: Performed by: INTERNAL MEDICINE

## 2021-04-29 PROCEDURE — 96365 THER/PROPH/DIAG IV INF INIT: CPT

## 2021-04-29 PROCEDURE — 96361 HYDRATE IV INFUSION ADD-ON: CPT

## 2021-04-29 RX ORDER — HEPARIN SODIUM (PORCINE) LOCK FLUSH IV SOLN 100 UNIT/ML 100 UNIT/ML
500 SOLUTION INTRAVENOUS PRN
Status: CANCELLED | OUTPATIENT
Start: 2021-04-30

## 2021-04-29 RX ORDER — METHYLPREDNISOLONE SODIUM SUCCINATE 125 MG/2ML
125 INJECTION, POWDER, LYOPHILIZED, FOR SOLUTION INTRAMUSCULAR; INTRAVENOUS ONCE
Status: CANCELLED | OUTPATIENT
Start: 2021-04-30 | End: 2021-04-30

## 2021-04-29 RX ORDER — SODIUM CHLORIDE 9 MG/ML
25 INJECTION, SOLUTION INTRAVENOUS PRN
Status: CANCELLED | OUTPATIENT
Start: 2021-04-30

## 2021-04-29 RX ORDER — 0.9 % SODIUM CHLORIDE 0.9 %
500 INTRAVENOUS SOLUTION INTRAVENOUS ONCE
Status: COMPLETED | OUTPATIENT
Start: 2021-04-29 | End: 2021-04-29

## 2021-04-29 RX ORDER — SODIUM CHLORIDE 0.9 % (FLUSH) 0.9 %
5-40 SYRINGE (ML) INJECTION PRN
Status: CANCELLED | OUTPATIENT
Start: 2021-04-30

## 2021-04-29 RX ORDER — DIPHENHYDRAMINE HYDROCHLORIDE 50 MG/ML
50 INJECTION INTRAMUSCULAR; INTRAVENOUS ONCE
Status: CANCELLED | OUTPATIENT
Start: 2021-04-30 | End: 2021-04-30

## 2021-04-29 RX ORDER — EPINEPHRINE 1 MG/ML
0.3 INJECTION, SOLUTION, CONCENTRATE INTRAVENOUS PRN
Status: CANCELLED | OUTPATIENT
Start: 2021-04-30

## 2021-04-29 RX ORDER — SODIUM CHLORIDE 0.9 % (FLUSH) 0.9 %
5-40 SYRINGE (ML) INJECTION PRN
Status: DISCONTINUED | OUTPATIENT
Start: 2021-04-29 | End: 2021-04-30 | Stop reason: HOSPADM

## 2021-04-29 RX ORDER — SODIUM CHLORIDE 9 MG/ML
INJECTION, SOLUTION INTRAVENOUS CONTINUOUS
Status: CANCELLED | OUTPATIENT
Start: 2021-04-30

## 2021-04-29 RX ADMIN — Medication 10 ML: at 14:02

## 2021-04-29 RX ADMIN — SODIUM CHLORIDE 500 ML: 9 INJECTION, SOLUTION INTRAVENOUS at 14:44

## 2021-04-29 RX ADMIN — Medication 20 ML: at 16:01

## 2021-04-29 RX ADMIN — ERTAPENEM SODIUM 1000 MG: 1 INJECTION, POWDER, LYOPHILIZED, FOR SOLUTION INTRAMUSCULAR; INTRAVENOUS at 14:01

## 2021-04-29 NOTE — PROGRESS NOTES
[facility]    IV Antibiotic Visit    NAME:  Luzmarai Don  YOB: 1970  MEDICAL RECORD NUMBER:  [id number]  DATE:  4/29/2021    Patient arrived to [place] [address]   [] per wheelchair   [x] ambulatory     Patient arrived ambulatory from medical office building as he was told  upon discharge that was where he was to go. In chair complained of being very hot BP 72/48. Drank two glasses of water. States he has emptied wright bag 3 times since discharge at 6 pm yesterday    Itching: No  Rash: No  Mouth Sores: No  Nausea: No  Vomiting: No  Diarrhea: No  Night Sweats: No  Fever: No    Administered: [] Peripheral access    [x] PICC access    [] Port access    No Known Allergies    Name of Antibiotic Administered: invanz  Dose of Antibiotic Administered: 54878 mg. Indication for Antibiotic: scrotal infection  Blood pressure still only 76/55. Call placed to Dr Hall Diss office. Dr Heatehr Perry returned call order received for  500 ml of Normal saline bolus. Patient reclined in chair. Bp 105/69-65  Bolus started. Patient eating peanut butter crackers. BP supine was 105 65. Bolus all infused. /71- 83. Dr Heather Perry called with updated BP. Dr Heather Perry returned call. Patient to go home, drink plenty of fluids and we are to get labs tomorrow  . Clinic Level of Care Assessment  Infusion Center      NAME:  Luzmaria Don  YOB: 1970 GENDER: male  MEDICAL RECORD NUMBER:  [id number]   DATE:  4/29/2021     Ambulation Status Document in Daily Care/Safety Tab   Status Definition Points   Independent Independently able to ambulate. Fully able (without any assistance) to get on/off exam table/chair. [x]   0   Minimal Physical Assistance Requires physical assistance of one person to ambulate and/or position patient to be examined. Includes necessary physical assistance to position lower extremities on/off stool.  []   1   Moderate Physical Assistance Requires at least one staff member to physically assist patient in ambulating into treatment room, and/or on off chair/bed. Requires assistance to bathroom. []   2   Full Assistance Requires assistance of at least two staff members to transfer patient into treatment room and/or on/off bed/chair. \"Total Transfer\". Unable to use bathroom requires bedside commode and/or bedpan []   3       Dressing Complexity Document in LDA Navigator  Complexity Definition Points   No Dressing  [x]   0   Simple Minimal, simple dressing. i.e. bandaid, gauze, simple wrap. Peripheral IV dressing. []   1   Intermediate Moderately complicated PICC dressing change requiring sterile procedure and site assessment. []   2   Complex Complicated dressing change port access requiring sterile procedure and site assessment. []   3       Teaching Effort Document in AVS and/or Education Navigator    Effort Definition Points   No Teaching  []   0   Simple Teach two or less topics. Teaching documented in discharge instructions in AVS and copy given to patient. [x]   1   Intermediate Teach three to four topics. Teaching documented in Discharge Instructions in AVS using References and Attachments. Copy given to patient. []   2   Complex Teach five to six topics. Teaching documented in Discharge Instructions in AVS using References and Attachments. May also be documentation in Education Navigator. Copy given to patient. []   3           Patient Assessment  Planning Definition Points   Simple Complete all tabs in patient assessment navigator. Review or complete patient'sTherapy Plan.      []   1   Intermediate Complete all tabs in patient assessment navigator. Review or completed patient'sTherapy Plan. Contact doctor based on nursing assessment. []   2   Complex Complete all tabs in patient assessment navigator. Completed patient's Therapy Plan.   Contact doctor based on nursing assessment and write order related to

## 2021-04-29 NOTE — TELEPHONE ENCOUNTER
[facility]    Outreach made within 2 business days of discharge: Yes    Patient: Bon Escamilla Patient : 1970   MRN: <[id number]>  Reason for Admission: There are no discharge diagnoses documented for the most recent discharge.   Discharge Date: 21       Spoke with: patient had appt today at [facility] for infusion for ongoing treatment    Discharge department/facility: [facility]    Scheduled appointment with PCP within 7-14 days    Follow Up  Future Appointments   Date Time Provider Elaine Ornelas   2021  2:00 PM [facility] INF [facility]   2021  8:45 AM Teodora Knowles MD [place], [place]

## 2021-04-30 ENCOUNTER — HOSPITAL ENCOUNTER (OUTPATIENT)
Dept: INFUSION THERAPY | Age: 51
Setting detail: INFUSION SERIES
Discharge: HOME | End: 2021-04-30
Payer: MEDICAID

## 2021-04-30 VITALS
TEMPERATURE: 98.7 F | SYSTOLIC BLOOD PRESSURE: 104 MMHG | OXYGEN SATURATION: 97 % | DIASTOLIC BLOOD PRESSURE: 71 MMHG | RESPIRATION RATE: 17 BRPM | HEART RATE: 99 BPM

## 2021-04-30 DIAGNOSIS — A41.9 SEPSIS WITHOUT ACUTE ORGAN DYSFUNCTION, DUE TO UNSPECIFIED ORGANISM (HCC): ICD-10-CM

## 2021-04-30 DIAGNOSIS — N50.89 SCROTAL SWELLING: ICD-10-CM

## 2021-04-30 DIAGNOSIS — N49.2 SCROTAL INFECTION: Primary | ICD-10-CM

## 2021-04-30 LAB
ANION GAP SERPL CALCULATED.3IONS-SCNC: 10 MMOL/L (ref 3–16)
BUN BLDV-MCNC: 11 MG/DL (ref 7–20)
CALCIUM SERPL-MCNC: 8.9 MG/DL (ref 8.3–10.6)
CHLORIDE BLD-SCNC: 101 MMOL/L (ref 99–110)
CO2: 25 MMOL/L (ref 21–32)
CREAT SERPL-MCNC: 0.8 MG/DL (ref 0.9–1.3)
GFR AFRICAN AMERICAN: >60
GFR NON-AFRICAN AMERICAN: >60
GLUCOSE BLD-MCNC: 147 MG/DL (ref 70–99)
HCT VFR BLD CALC: 32 % (ref 40.5–52.5)
HEMOGLOBIN: 10.8 G/DL (ref 13.5–17.5)
MCH RBC QN AUTO: 27.6 PG (ref 26–34)
MCHC RBC AUTO-ENTMCNC: 33.7 G/DL (ref 31–36)
MCV RBC AUTO: 81.9 FL (ref 80–100)
PDW BLD-RTO: 13.4 % (ref 12.4–15.4)
PLATELET # BLD: 334 K/UL (ref 135–450)
PMV BLD AUTO: 9.4 FL (ref 5–10.5)
POTASSIUM SERPL-SCNC: 4.1 MMOL/L (ref 3.5–5.1)
RBC # BLD: 3.91 M/UL (ref 4.2–5.9)
SODIUM BLD-SCNC: 136 MMOL/L (ref 136–145)
WBC # BLD: 10.1 K/UL (ref 4–11)

## 2021-04-30 PROCEDURE — 85027 COMPLETE CBC AUTOMATED: CPT

## 2021-04-30 PROCEDURE — 96365 THER/PROPH/DIAG IV INF INIT: CPT

## 2021-04-30 PROCEDURE — 2580000003 HC RX 258: Performed by: INTERNAL MEDICINE

## 2021-04-30 PROCEDURE — 80048 BASIC METABOLIC PNL TOTAL CA: CPT

## 2021-04-30 PROCEDURE — 6360000002 HC RX W HCPCS: Performed by: INTERNAL MEDICINE

## 2021-04-30 PROCEDURE — 36592 COLLECT BLOOD FROM PICC: CPT

## 2021-04-30 RX ORDER — SODIUM CHLORIDE 0.9 % (FLUSH) 0.9 %
5-40 SYRINGE (ML) INJECTION PRN
Status: DISCONTINUED | OUTPATIENT
Start: 2021-04-30 | End: 2021-05-01 | Stop reason: HOSPADM

## 2021-04-30 RX ORDER — SODIUM CHLORIDE 9 MG/ML
INJECTION, SOLUTION INTRAVENOUS CONTINUOUS
Status: CANCELLED | OUTPATIENT
Start: 2021-05-01

## 2021-04-30 RX ORDER — SODIUM CHLORIDE 0.9 % (FLUSH) 0.9 %
5-40 SYRINGE (ML) INJECTION PRN
Status: CANCELLED | OUTPATIENT
Start: 2021-05-01

## 2021-04-30 RX ORDER — EPINEPHRINE 1 MG/ML
0.3 INJECTION, SOLUTION, CONCENTRATE INTRAVENOUS PRN
Status: CANCELLED | OUTPATIENT
Start: 2021-05-01

## 2021-04-30 RX ORDER — HEPARIN SODIUM (PORCINE) LOCK FLUSH IV SOLN 100 UNIT/ML 100 UNIT/ML
500 SOLUTION INTRAVENOUS PRN
Status: CANCELLED | OUTPATIENT
Start: 2021-05-01

## 2021-04-30 RX ORDER — DIPHENHYDRAMINE HYDROCHLORIDE 50 MG/ML
50 INJECTION INTRAMUSCULAR; INTRAVENOUS ONCE
Status: CANCELLED | OUTPATIENT
Start: 2021-05-01 | End: 2021-05-01

## 2021-04-30 RX ORDER — METHYLPREDNISOLONE SODIUM SUCCINATE 125 MG/2ML
125 INJECTION, POWDER, LYOPHILIZED, FOR SOLUTION INTRAMUSCULAR; INTRAVENOUS ONCE
Status: CANCELLED | OUTPATIENT
Start: 2021-05-01 | End: 2021-05-01

## 2021-04-30 RX ORDER — SODIUM CHLORIDE 9 MG/ML
25 INJECTION, SOLUTION INTRAVENOUS PRN
Status: CANCELLED | OUTPATIENT
Start: 2021-05-01

## 2021-04-30 RX ADMIN — ERTAPENEM SODIUM 1000 MG: 1 INJECTION, POWDER, LYOPHILIZED, FOR SOLUTION INTRAMUSCULAR; INTRAVENOUS at 15:11

## 2021-04-30 RX ADMIN — Medication 10 ML: at 15:47

## 2021-04-30 RX ADMIN — Medication 10 ML: at 15:10

## 2021-04-30 ASSESSMENT — PAIN SCALES - GENERAL: PAINLEVEL_OUTOF10: 0

## 2021-04-30 NOTE — PROGRESS NOTES
[address] [facility]\""    IV Antibiotic Visit    NAME:  Darrin Busby  YOB: 1970  MEDICAL RECORD NUMBER:  [id number]  DATE:  4/30/2021    Patient arrived to [place] [address]   [] per wheelchair   [x] ambulatory     Itching: No  Rash: No  Mouth Sores: No  Nausea: No  Vomiting: No  Diarrhea: No  Night Sweats: No  Fever: No    Administered: [] Peripheral access    [x] PICC access    [] Port access    No Known Allergies    Name of Antibiotic Administered: Invanz  Dose of Antibiotic Administered: 1000 mg. Indication for Antibiotic: scrotal abscess      Response to treatment:  Well tolerated by patient. Scheduled to return for next antibiotic dose tomorrow     Electronically signed by Muriel Cockayne, RN on 4/30/2021 at 4:26 PM        Outpatient [address] [place] [address]    PICC Lab Draw    NAME:  Satish Santana OF BIRTH:  1970  MEDICAL RECORD NUMBER:  [id number]  DATE:  4/30/2021    Patient arrived to [place] [address]   [] per wheelchair   [x] ambulatory     PICC Location:right arm    PICC Site:  Redness: No  Bruising: No   Edema: No  Pain: No     PICC Labs  Cap cleansed with Chloroprep Scrub for 30 seconds and air dried completely for 1 minute on sterile 4X4: Yes  Cap removed: Yes   Blood drawn using a 10/ml syringe  Amount of blood wasted 10/ml syringe    Labs Obtained: Yes  Lab Test(s) Ordered: CBC and BMP  PICC Flushed with 20ml/NS: Yes  New Cap applied: Yes     Response to treatment:  Well tolerated by patient.       Electronically signed by Muriel Cockayne, RN on 4/30/2021 at 4:26 PM

## 2021-04-30 NOTE — DISCHARGE INSTR - COC
Continuity of Care Form    Patient Name: Dae Burks   :  1970  MRN:  [id number]    Admit date:  2021  Discharge date:  ***    Code Status Order: Prior   Advance Directives:     Admitting Physician:  No admitting provider for patient encounter. PCP: Davi Beasley MD    Discharging Nurse: [facility] Unit/Room#: No information available for this encounter.   Discharging Unit Phone Number: ***    Emergency Contact:   Extended Emergency Contact Information  Primary Emergency Contact: Alla Horta  Address: [address] [place] [address] Road Phone: [phone]  Relation: Other  Secondary Emergency Contact: Yair Weeks  Home Phone: [phone]  Relation: Child    Past Surgical History:  Past Surgical History:   Procedure Laterality Date    CYSTOSCOPY N/A 3/29/2021    CYSTOSCOPY,  SUPRAPUBIC TUBE INSERTION performed by Tuan Wilson MD at [facility] 2021    INCISION AND DRAINAGE SCROTAL ABSCESS, [address] [facility], EXCHANGE OF SUPRA PUBIC CATHETER performed by Tuan Wilson MD at [place] [address]       Immunization History:   Immunization History   Administered Date(s) Administered    Influenza, Quadv, IM, PF (6 mo and older Fluzone, Flulaval, Fluarix, and 3 yrs and older Afluria) 2018, 2020    PPD Test 2017    Tdap (Boostrix, Adacel) 08/10/2016       Active Problems:  Patient Active Problem List   Diagnosis Code    Anxiety F41.9    History of tobacco abuse Z87.891    Discitis of lumbar region M46.46    Osteomyelitis of lumbar spine ([address] M46.26    Weight loss, non-intentional R63.4    Prediabetes R73.03    Obesity due to excess calories with serious comorbidity E66.09    Obesity due to excess calories E66.09    Acute cystitis with hematuria N30.01    Urinary retention R33.9    Bilateral hydronephrosis N13.30    Acute midline low back pain M54.5    Morbid obesity due to excess calories (HCC) E66.01    Sepsis ([address] A41.9  Tachycardia R00.0    Leukocytosis D72.829    Scrotal swelling N50.89    Bacteriuria R82.71    Hallucinations R44.3    Scrotal infection N49.2       Isolation/Infection:   Isolation          No Isolation        Patient Infection Status     Infection Onset Added Last Indicated Last Indicated By Review Planned Expiration Resolved Resolved By    None active    Resolved    COVID-19 Rule Out 04/22/21 04/22/21 04/22/21 COVID-19, Rapid (Ordered)   04/22/21 Rule-Out Test Resulted          Nurse Assessment:  Last Vital Signs: /71   Pulse 99   Temp 98.7 °F (37.1 °C) (Oral)   Resp 17   SpO2 97%     Last documented pain score (0-10 scale): Pain Level: 0  Last Weight:   Wt Readings from Last 1 Encounters:   04/29/21 237 lb (107.5 kg)     Mental Status:  {IP PT MENTAL STATUS:20030}    IV Access:  { ELLE IV ACCESS:651741680}    Nursing Mobility/ADLs:  Walking   {CHP DME BGAX:794048898}  Transfer  {CHP DME PYOB:111608254}  Bathing  {CHP DME QNUP:518521810}  Dressing  {CHP DME LVFM:507284851}  Toileting  {CHP DME HYDM:072660531}  Feeding  {CHP DME KVGW:707973667}  Med Admin  {CHP DME ISPU:538532780}  Med Delivery   { ELLE MED Delivery:976309998}    Wound Care Documentation and Therapy:        Elimination:  Continence:   · Bowel: {YES / SV:07937}  · Bladder: {YES / VY:92701}  Urinary Catheter: {Urinary Catheter:653074720}   Colostomy/Ileostomy/Ileal Conduit: {YES / AC:57089}       Date of Last BM: ***  No intake or output data in the 24 hours ending 04/30/21 1627  No intake/output data recorded.     Safety Concerns:     [address] Safety Concerns:072748467}    Impairments/Disabilities:      [address] Impairments/Disabilities:950014432}    Nutrition Therapy:  Current Nutrition Therapy:   [address] Diet List:115583767}    Routes of Feeding: {P DME Other Feedings:715229292}  Liquids: {Slp liquid thickness:75797}  Daily Fluid Restriction: {CHP DME Yes amt example:668777943}  Last Modified Barium Swallow with Video (Video Swallowing Test): {Done Not Done BTYF:633983539}    Treatments at the Time of Hospital Discharge:   Respiratory Treatments: ***  Oxygen Therapy:  {Therapy; copd oxygen:57947}  Ventilator:    { CC Vent VIMW:046713654}    Rehab Therapies: {THERAPEUTIC INTERVENTION:4664821167}  Weight Bearing Status/Restrictions: { CC Weight Bearin}  Other Medical Equipment (for information only, NOT a DME order):  {EQUIPMENT:569363052}  Other Treatments: ***    Patient's personal belongings (please select all that are sent with patient):  {[facility] DME Belongings:805717767}    RN SIGNATURE:  {Esignature:331524027}    CASE MANAGEMENT/SOCIAL WORK SECTION    Inpatient Status Date: ***    Readmission Risk Assessment Score:  Readmission Risk              Risk of Unplanned Readmission:        0           Discharging to Facility/ Agency   · Name:   · Address:  · Phone:  · Fax:    Dialysis Facility (if applicable)   · Name:  · Address:  · Dialysis Schedule:  · Phone:  · Fax:    / signature: {Esignature:919669667}    PHYSICIAN SECTION    Prognosis: {Prognosis:7774708746}    Condition at Discharge: [address] [place] [address] Patient Condition:211399769}    Rehab Potential (if transferring to Rehab): {Prognosis:8095519041}    Recommended Labs or Other Treatments After Discharge: ***    Physician Certification: I certify the above information and transfer of Jeremías Ban  is necessary for the continuing treatment of the diagnosis listed and that he requires {Admit to Appropriate Level of Care:70585} for {GREATER/LESS:518042199} 30 days.      Update Admission H&P: {CHP DME Changes in VKOLI:972309964}    PHYSICIAN SIGNATURE:  {Esignature:383849943}

## 2021-05-03 ENCOUNTER — HOSPITAL ENCOUNTER (OUTPATIENT)
Dept: INFUSION THERAPY | Age: 51
Setting detail: INFUSION SERIES
Discharge: HOME | End: 2021-05-03
Payer: COMMERCIAL

## 2021-05-03 VITALS
RESPIRATION RATE: 17 BRPM | HEART RATE: 86 BPM | TEMPERATURE: 98.7 F | DIASTOLIC BLOOD PRESSURE: 82 MMHG | SYSTOLIC BLOOD PRESSURE: 121 MMHG | OXYGEN SATURATION: 97 %

## 2021-05-03 DIAGNOSIS — N50.89 SCROTAL SWELLING: ICD-10-CM

## 2021-05-03 DIAGNOSIS — A41.9 SEPSIS WITHOUT ACUTE ORGAN DYSFUNCTION, DUE TO UNSPECIFIED ORGANISM (HCC): ICD-10-CM

## 2021-05-03 DIAGNOSIS — N49.2 SCROTAL INFECTION: Primary | ICD-10-CM

## 2021-05-03 PROCEDURE — 6360000002 HC RX W HCPCS: Performed by: INTERNAL MEDICINE

## 2021-05-03 PROCEDURE — 96365 THER/PROPH/DIAG IV INF INIT: CPT

## 2021-05-03 PROCEDURE — 2580000003 HC RX 258: Performed by: INTERNAL MEDICINE

## 2021-05-03 PROCEDURE — 99212 OFFICE O/P EST SF 10 MIN: CPT

## 2021-05-03 RX ORDER — HEPARIN SODIUM (PORCINE) LOCK FLUSH IV SOLN 100 UNIT/ML 100 UNIT/ML
500 SOLUTION INTRAVENOUS PRN
Status: CANCELLED | OUTPATIENT
Start: 2021-05-04

## 2021-05-03 RX ORDER — EPINEPHRINE 1 MG/ML
0.3 INJECTION, SOLUTION, CONCENTRATE INTRAVENOUS PRN
Status: CANCELLED | OUTPATIENT
Start: 2021-05-04

## 2021-05-03 RX ORDER — METHYLPREDNISOLONE SODIUM SUCCINATE 125 MG/2ML
125 INJECTION, POWDER, LYOPHILIZED, FOR SOLUTION INTRAMUSCULAR; INTRAVENOUS ONCE
Status: CANCELLED | OUTPATIENT
Start: 2021-05-04 | End: 2021-05-04

## 2021-05-03 RX ORDER — SODIUM CHLORIDE 0.9 % (FLUSH) 0.9 %
5-40 SYRINGE (ML) INJECTION PRN
Status: DISCONTINUED | OUTPATIENT
Start: 2021-05-03 | End: 2021-05-04 | Stop reason: HOSPADM

## 2021-05-03 RX ORDER — SODIUM CHLORIDE 9 MG/ML
25 INJECTION, SOLUTION INTRAVENOUS PRN
Status: CANCELLED | OUTPATIENT
Start: 2021-05-04

## 2021-05-03 RX ORDER — SODIUM CHLORIDE 9 MG/ML
INJECTION, SOLUTION INTRAVENOUS CONTINUOUS
Status: CANCELLED | OUTPATIENT
Start: 2021-05-04

## 2021-05-03 RX ORDER — SODIUM CHLORIDE 0.9 % (FLUSH) 0.9 %
5-40 SYRINGE (ML) INJECTION PRN
Status: CANCELLED | OUTPATIENT
Start: 2021-05-04

## 2021-05-03 RX ORDER — DIPHENHYDRAMINE HYDROCHLORIDE 50 MG/ML
50 INJECTION INTRAMUSCULAR; INTRAVENOUS ONCE
Status: CANCELLED | OUTPATIENT
Start: 2021-05-04 | End: 2021-05-04

## 2021-05-03 RX ADMIN — Medication 10 ML: at 13:53

## 2021-05-03 RX ADMIN — Medication 20 ML: at 14:40

## 2021-05-03 RX ADMIN — Medication 10 ML: at 14:36

## 2021-05-03 RX ADMIN — ERTAPENEM SODIUM 1000 MG: 1 INJECTION, POWDER, LYOPHILIZED, FOR SOLUTION INTRAMUSCULAR; INTRAVENOUS at 13:49

## 2021-05-03 NOTE — PROGRESS NOTES
Clinic Level of Care Assessment  Infusion Center      NAME:  Ruth Collier  YOB: 1970 GENDER: male  MEDICAL RECORD NUMBER:  [id number]   DATE:  5/3/2021     Ambulation Status Document in Daily Care/Safety Tab   Status Definition Points   Independent Independently able to ambulate. Fully able (without any assistance) to get on/off exam table/chair. [x]   0   Minimal Physical Assistance Requires physical assistance of one person to ambulate and/or position patient to be examined. Includes necessary physical assistance to position lower extremities on/off stool. []   1   Moderate Physical Assistance Requires at least one staff member to physically assist patient in ambulating into treatment room, and/or on off chair/bed. Requires assistance to bathroom. []   2   Full Assistance Requires assistance of at least two staff members to transfer patient into treatment room and/or on/off bed/chair. \"Total Transfer\". Unable to use bathroom requires bedside commode and/or bedpan []   3       Dressing Complexity Document in LDA Navigator  Complexity Definition Points   No Dressing  []   0   Simple Minimal, simple dressing. i.e. bandaid, gauze, simple wrap. Peripheral IV dressing. []   1   Intermediate Moderately complicated PICC dressing change requiring sterile procedure and site assessment. [x]   2   Complex Complicated dressing change port access requiring sterile procedure and site assessment. []   3       Teaching Effort Document in AVS and/or Education Navigator    Effort Definition Points   No Teaching  []   0   Simple Teach two or less topics. Teaching documented in discharge instructions in AVS and copy given to patient. [x]   1   Intermediate Teach three to four topics. Teaching documented in Discharge Instructions in AVS using References and Attachments. Copy given to patient. []   2   Complex Teach five to six topics.  Teaching documented in Discharge Instructions in AVS using References and Attachments. May also be documentation in Education Navigator. Copy given to patient. []   3           Patient Assessment  Planning Definition Points   Simple Complete all tabs in patient assessment navigator. Review or complete patient'sTherapy Plan. [x]   1   Intermediate Complete all tabs in patient assessment navigator. Review or completed patient'sTherapy Plan. Contact doctor based on nursing assessment. []   2   Complex Complete all tabs in patient assessment navigator. Completed patient's Therapy Plan. Contact doctor based on nursing assessment and write order related to needed care. []   3     Patient Planning   Planning Definition Points   Simple Discharged, instructions and After Visit Summary given to patient/caregiver and instructions completed. Simple follow-up with routine assessment and planning. [x]   1   Intermediate Discharged, instructions and After Visit Summary given to patient/caregiver and instructions completed. Contact with outside resources; i.e. Telephone calls to PCP, home health, ECF and/or arranging transportation. []   2   Complex Discharged, instructions and After Visit Summary given to patient/caregiver and instructions completed. Contact with outside resources; i.e. Telephone calls to PCP, home health, ECF and/or arranging transportation. May include filling out forms and/or writing letters, communication with insurance , preauthorization for treatment.    []   3       Is this the Patient's First Visit to the [facility]  No    Is this Patient Established @ this [facility] [place]  Yes              Clinical Level of Care      Points  0-2  Level 1 []     Points  3-5  Level 2 [x]     Points  6-9  Level 3 []     Points  10-12  Level 4 []     Points  13-15  Level 5 []       Electronically signed by Billy Echeverria RN on 5/3/2021 at 2:43 PM

## 2021-05-03 NOTE — PROGRESS NOTES
[address]    IV Antibiotic Visit    NAME:  Ivan Rodarte  YOB: 1970  MEDICAL RECORD NUMBER:  [id number]  DATE:  5/3/2021    Patient arrived to [facility] [address]   [] per wheelchair   [x] ambulatory     Itching: No  Rash: No  Mouth Sores: No  Nausea: No  Vomiting: No  Diarrhea: No  Night Sweats: No  Fever: No    Administered: [] Peripheral access    [x] PICC access    [] Port access    No Known Allergies    Name of Antibiotic Administered: invanz  Dose of Antibiotic Administered: 1000 mg. Indication for Antibiotic: UTI*      Response to treatment:  Well tolerated by patient.        Scheduled to return for next antibiotic dose tomorrow     Electronically signed by Janeth Ladd RN on 5/3/2021 at 1:59 PM

## 2021-05-03 NOTE — PROGRESS NOTES
Outpatient [facility]    PICC Dressing Change    NAME:  Pdero Wilkerson  YOB: 1970  MEDICAL RECORD NUMBER:  [id number]  DATE:  5/3/2021    Patient arrived to [facility]   [] per wheelchair   [x] ambulatory     PICC Location:right arm    PICC Site:  Redness: No  Bruising: No   Edema: No  Pain: No     PICC Cleansed:  Current dressing and stat lock removed: Yes  Chloroprep Scrub for 30 seconds and air dried completely for 1 minute: Yes     PICC Dressing Change  Skin barrier: Yes  Stat lock applied and PICC line secured Yes  Biopatch applied: Yes   PICC site covered with Tegaderm Yes  Date and initials applied to PICC dressing Yes  [] Other:    Next Dressing Due: May 11, 2021. Response to treatment:  Well tolerated by patient.       Electronically signed by Glenn Toribio RN on 5/3/2021 at 2:39 PM

## 2021-05-04 ENCOUNTER — HOSPITAL ENCOUNTER (OUTPATIENT)
Dept: INFUSION THERAPY | Age: 51
Setting detail: INFUSION SERIES
Discharge: HOME | End: 2021-05-04
Payer: COMMERCIAL

## 2021-05-04 ENCOUNTER — HOSPITAL ENCOUNTER (OUTPATIENT)
Dept: WOUND CARE | Age: 51
Discharge: HOME | End: 2021-05-04
Payer: COMMERCIAL

## 2021-05-04 VITALS
HEIGHT: 72 IN | WEIGHT: 241.62 LBS | BODY MASS INDEX: 32.73 KG/M2 | TEMPERATURE: 97.3 F | DIASTOLIC BLOOD PRESSURE: 78 MMHG | HEART RATE: 107 BPM | SYSTOLIC BLOOD PRESSURE: 115 MMHG | RESPIRATION RATE: 16 BRPM

## 2021-05-04 VITALS
TEMPERATURE: 98.3 F | HEART RATE: 79 BPM | SYSTOLIC BLOOD PRESSURE: 98 MMHG | DIASTOLIC BLOOD PRESSURE: 64 MMHG | RESPIRATION RATE: 18 BRPM | OXYGEN SATURATION: 97 %

## 2021-05-04 DIAGNOSIS — A41.9 SEPSIS WITHOUT ACUTE ORGAN DYSFUNCTION, DUE TO UNSPECIFIED ORGANISM (HCC): ICD-10-CM

## 2021-05-04 DIAGNOSIS — S31.30XS OPEN WOUND OF SCROTUM, SEQUELA: ICD-10-CM

## 2021-05-04 DIAGNOSIS — N50.89 SCROTAL SWELLING: ICD-10-CM

## 2021-05-04 DIAGNOSIS — N49.2 SCROTAL INFECTION: Primary | ICD-10-CM

## 2021-05-04 PROBLEM — S31.30XA OPEN WOUND OF SCROTUM: Status: ACTIVE | Noted: 2021-05-04

## 2021-05-04 PROCEDURE — 6360000002 HC RX W HCPCS: Performed by: INTERNAL MEDICINE

## 2021-05-04 PROCEDURE — 99204 OFFICE O/P NEW MOD 45 MIN: CPT | Performed by: EMERGENCY MEDICINE

## 2021-05-04 PROCEDURE — 99213 OFFICE O/P EST LOW 20 MIN: CPT

## 2021-05-04 PROCEDURE — 96365 THER/PROPH/DIAG IV INF INIT: CPT

## 2021-05-04 PROCEDURE — 2580000003 HC RX 258: Performed by: INTERNAL MEDICINE

## 2021-05-04 RX ORDER — DIPHENHYDRAMINE HYDROCHLORIDE 50 MG/ML
50 INJECTION INTRAMUSCULAR; INTRAVENOUS ONCE
Status: CANCELLED | OUTPATIENT
Start: 2021-05-05 | End: 2021-05-05

## 2021-05-04 RX ORDER — SODIUM CHLORIDE 0.9 % (FLUSH) 0.9 %
5-40 SYRINGE (ML) INJECTION PRN
Status: DISCONTINUED | OUTPATIENT
Start: 2021-05-04 | End: 2021-05-05 | Stop reason: HOSPADM

## 2021-05-04 RX ORDER — LIDOCAINE HYDROCHLORIDE 40 MG/ML
SOLUTION TOPICAL ONCE
Status: COMPLETED | OUTPATIENT
Start: 2021-05-04 | End: 2021-05-04

## 2021-05-04 RX ORDER — METHYLPREDNISOLONE SODIUM SUCCINATE 125 MG/2ML
125 INJECTION, POWDER, LYOPHILIZED, FOR SOLUTION INTRAMUSCULAR; INTRAVENOUS ONCE
Status: CANCELLED | OUTPATIENT
Start: 2021-05-05 | End: 2021-05-05

## 2021-05-04 RX ORDER — HEPARIN SODIUM (PORCINE) LOCK FLUSH IV SOLN 100 UNIT/ML 100 UNIT/ML
500 SOLUTION INTRAVENOUS PRN
Status: CANCELLED | OUTPATIENT
Start: 2021-05-05

## 2021-05-04 RX ORDER — SODIUM CHLORIDE 0.9 % (FLUSH) 0.9 %
5-40 SYRINGE (ML) INJECTION PRN
Status: CANCELLED | OUTPATIENT
Start: 2021-05-05

## 2021-05-04 RX ORDER — SODIUM CHLORIDE 9 MG/ML
INJECTION, SOLUTION INTRAVENOUS CONTINUOUS
Status: CANCELLED | OUTPATIENT
Start: 2021-05-05

## 2021-05-04 RX ORDER — EPINEPHRINE 1 MG/ML
0.3 INJECTION, SOLUTION, CONCENTRATE INTRAVENOUS PRN
Status: CANCELLED | OUTPATIENT
Start: 2021-05-05

## 2021-05-04 RX ORDER — SODIUM CHLORIDE 9 MG/ML
25 INJECTION, SOLUTION INTRAVENOUS PRN
Status: CANCELLED | OUTPATIENT
Start: 2021-05-05

## 2021-05-04 RX ADMIN — Medication 10 ML: at 10:47

## 2021-05-04 RX ADMIN — LIDOCAINE HYDROCHLORIDE: 40 SOLUTION TOPICAL at 09:00

## 2021-05-04 RX ADMIN — ERTAPENEM SODIUM 1000 MG: 1 INJECTION, POWDER, LYOPHILIZED, FOR SOLUTION INTRAMUSCULAR; INTRAVENOUS at 10:47

## 2021-05-04 RX ADMIN — Medication 30 ML: at 11:22

## 2021-05-04 ASSESSMENT — PAIN SCALES - GENERAL
PAINLEVEL_OUTOF10: 0
PAINLEVEL_OUTOF10: 0

## 2021-05-04 NOTE — H&P
Right 2021    INCISION AND DRAINAGE SCROTAL ABSCESS, CYSTOSCOPY, EXCHANGE OF SUPRA PUBIC CATHETER performed by Emily Rosen MD at [facility] 27 HISTORY    Family History   Problem Relation Age of Onset    Stroke Father     Asthma Sister     High Blood Pressure Brother     No Known Problems Mother        SOCIAL HISTORY    Social History     Tobacco Use    Smoking status: Former Smoker     Packs/day: 2.00     Years: 10.00     Pack years: 20.00     Quit date: 2007     Years since quittin.9    Smokeless tobacco: Former User   Substance Use Topics    Alcohol use: No     Alcohol/week: 0.0 standard drinks    Drug use: No       ALLERGIES    No Known Allergies    MEDICATIONS    Current Outpatient Medications on File Prior to Encounter   Medication Sig Dispense Refill    escitalopram (LEXAPRO) 20 MG tablet Take 1 tablet by mouth daily 30 tablet 1    metroNIDAZOLE (FLAGYL) 500 MG tablet Take 1 tablet by mouth 3 times daily for 7 days 21 tablet 0    QUEtiapine (SEROQUEL) 25 MG tablet Take 1 tablet by mouth 2 times daily for 14 days 28 tablet 0     No current facility-administered medications on file prior to encounter. REVIEW OF SYSTEMS  A comprehensive review of systems was negative except for: wound. drainage    Objective:      /78   Pulse 107   Temp 97.3 °F (36.3 °C) (Temporal)   Resp 16   Ht 6' (1.829 m)   Wt 241 lb 10 oz (109.6 kg)   BMI 32.77 kg/m²     Wt Readings from Last 3 Encounters:   21 241 lb 10 oz (109.6 kg)   21 237 lb (107.5 kg)   21 237 lb 3.4 oz (107.6 kg)       PHYSICAL EXAM  General Appearance/Constitutional: alert and oriented to person, place and time, well-developed and well nourished, and in no acute distress.  nontoxic  Skin: warm and dry, no rash, positive wound per LDA documentation  Head: normocephalic and atraumatic  Eyes: extraocular eye movements intact, conjunctivae normal, and sclera anicteric  ENT: hearing grossly normal bilaterally. Normal appearance  Pulmonary/Chest: no chest wall tenderness and clear anteriorly. No respiratory distress  Cardiovascular: normal rate and regular rhythm  GI: abdomen soft, non-tender and non-distended  Musculoskeletal: normal range of motion of joints. Nontende, hypertension, anxiety r calves. No cyanosis. Nontender calves. Edema 1-2+ of scrotum  Neurologic: no gross cranial nerve deficit and speech normal. No focal deficits. Mental status normal      Assessment:     52-year-old male with a nonhealing surgical wound to scrotum. Severity of fat layers exposed. Patient is at moderate risk for morbidity and mortality due to conditions affecting wound healing and addressed today: Prediabetes  Education and discussion held with patient regarding these disease processes pertinent to wound(s). Problem List Items Addressed This Visit     Open wound of scrotum        Wound evaluation: Wound with mild overlying exudates and biofilm easily cleansed with gauze and saline. Granular tissue visible. Tender to palpation. Sutures noted to the superior edge. Plan:     1. Wound care:  Please see attached Discharge Instructions for specific wound treatment plan  2. Offloading and edema control: Elevation of the scrotum at all times. Avoid pressure and trauma to wound  3. Infection: No signs of acute infection. Continue to monitor  4. Circulation: Nonissue for location of wound although pressure relief is needed to help with local skin perfusion  5. Nutrition: Discussed in detail with patient. Encourage protein emphasis with meals. Encourage low glycemic index food intake.     Other provider notes reviewed  Admission notes reviewed  MRI of pelvis images with contrast reviewed    45 minutes spent with patient and patient care related issues    Wound Measurements:  Pre and post Debridement Measurements:  Are located in the [place]  Documentation Flow Sheet  Wound 05/04/21 Scrotum #1 (Active) Wound Image   05/04/21 0857   Dressing/Treatment Alginate with Ag;Gauze dressing/dressing sponge 05/04/21 0957   Wound Length (cm) 3 cm 05/04/21 0857   Wound Width (cm) 0.4 cm 05/04/21 0857   Wound Depth (cm) 0.5 cm 05/04/21 0857   Wound Surface Area (cm^2) 1.2 cm^2 05/04/21 0857   Wound Volume (cm^3) 0.6 cm^3 05/04/21 0857   Post-Procedure Length (cm) 3 cm 05/04/21 0941   Post-Procedure Width (cm) 0.4 cm 05/04/21 0941   Post-Procedure Depth (cm) 0.5 cm 05/04/21 0941   Post-Procedure Surface Area (cm^2) 1.2 cm^2 05/04/21 0941   Post-Procedure Volume (cm^3) 0.6 cm^3 05/04/21 0941   Wound Assessment Granulation tissue;Slough 05/04/21 0857   Drainage Amount Moderate 05/04/21 0857   Drainage Description Serosanguinous 05/04/21 0857   Odor None 05/04/21 0857   Tosin-wound Assessment Dry/flaky 05/04/21 0857   Margins Attached edges 05/04/21 0857   Wound Thickness Description not for Pressure Injury Full thickness 05/04/21 0857   Number of days: 0       Treatment Note please see attached Discharge Instructions    Written patient dismissal instructions given to patient and signed by patient or POA. Discharge Instructions       [address] and Hyperbaric Oxygen Therapy   Physician Orders and Discharge Instructions  [address] [facility]  [address] [facility]   [address], [facility] [address], [facility] 24  Telephone: [phone]    NAME:  Murl Spurling  YOB: 1970  MEDICAL RECORD NUMBER:  [id number]  DATE:  5/4/2021    Wound Cleansing:   Do not scrub or use excessive force. Cleanse wound prior to applying a clean dressing with:  [] Normal Saline  [x] Keep Wound Dry in Shower    [] Wound Cleanser   [] Cleanse wound with Mild Soap & Water  [] May Shower at Discharge   [] Other:       Topical Treatments:  Do not apply lotions, creams, or ointments to wound bed unless directed.    [] Apply moisturizing lotion to skin surrounding the wound prior to dressing change.  [] Apply antifungal ointment to skin surrounding the wound prior to dressing change.  [] Apply thin film of moisture barrier ointment to skin immediately around wound. [] Other:       Dressings:           Wound Location Scrotum   [x] Apply Primary Dressing:       [x] Alginate with Silver [] Alginate     [x] Cover and Secure with:     [x] Gauze [] Paulina [] Roll gauze   [] Ace Wrap [] Cover Roll Tape [] ABD     [x] Other: Spandage and Underwear to Hold in Place   Avoid contact of tape with skin. [x] Change dressing: [x] Daily    [] Every Other Day [] Three times per week   [] Once a week [] Do Not Change Dressing   [] Other:       Compression:  Apply: [] Multilayer Compression Wrap Applied in Clinic []RightLeg []Left Leg   [] Multi-layer compression. Do not get leg(s) with wrap wet. If wraps become too tight call the center or completely remove the wrap. [] Elevate leg(s) above the level of the heart when sitting. [] Avoid prolonged standing in one place. Dietary:  [x] Diet as tolerated: Activity:  [x] Activity as tolerated:  [] Patient has no activity restrictions     [] Strict Bedrest: [] Remain off Work:     [] May return to full duty work:                                   [] Return to work with restrictions: If you are still having pain after you go home:  [x] Elevate the affected limb. [x] Use over-the-counter medications you would normally use for pain as permitted by your family doctor. [x] For persistent pain not relieved by the above interventions, please call your family doctor.              Return Appointment:  [] Wound and dressing supply provider:   [] ECF or Home Healthcare:  [] Wound Assessment: [] Physician or NP scheduled for Wound Assessment:   [x] Return Appointment: With Dr. Agus Jaime  in  [facility])  [] Ordered tests:     Nurse Case Manger:  Lupe Craig   Electronically signed by Joey Brandt RN on 5/4/2021 at 9:47 AM    [address]: Should you experience any significant changes in your wound(s) or have questions about your wound care, please contact the [address] [place] [address] at [address] 8:00 am - 4:30 pm and Friday 8:00 am - 12:30 pm.  If you need help with your wound outside these hours and cannot wait until we are again available, contact your PCP or go to the hospital emergency room. PLEASE NOTE: IF YOU ARE UNABLE TO OBTAIN WOUND SUPPLIES, CONTINUE TO USE THE SUPPLIES YOU HAVE AVAILABLE UNTIL YOU ARE ABLE TO REACH US. IT IS MOST IMPORTANT TO KEEP THE WOUND COVERED AT ALL TIMES.      Physician Signature:_______________________    Date: ___________ Time:  ____________        Dr. Fanny Guido MD                         Electronically signed by Jane Clayton MD on 5/4/2021 at 10:37 AM

## 2021-05-04 NOTE — LETTER
[address]  [address] OFFICE [address] [place] [address] 29 [facility],First Floor [address]  [phone]  Dept: [phone]        Thank you . Love Goodwin for choosing [facility] for your Wound Care needs. We hope you found your first visit both encouraging and educational.  We look forward to serving you throughout the healing process. Before your next visit please review the information you received in your [employer]. The first visit can be overwhelming and this is a useful tool to refresh what information you may have been given. If you find yourself with any questions prior to your upcoming appointment, please feel free to contact us. Often wounds can be challenging and it is our goal to see you through the healing process with as much support possible. Again, thank you for choosing [facility] [place]!     Sincerely,      The Staff of [address] [place] [address] Pkwy and Hyperbaric Oxygen Therapy

## 2021-05-04 NOTE — LETTER
[address]  [address] OFFICE [address]  [facility] [address]  [phone]  Dept: [phone]   TODAYS DATE: 5/4/2021    [facility]   Appointment Treatment Guidelines  Welcome Mr. Ree Abreu to the [address] [place] [address] Pkwy. We appreciate the confidence you have shown in choosing us as your wound care provider. Our goal is to heal your wound(s) as quickly as possible. Please read the items below regarding the nature of your appointments. 1. We will make every effort to schedule appointments that are convenient for you. Certain days and times may not be available, depending on your providers office hours and details of your care. 2. Patients will not necessarily be brought to an exam room in the order in which they arrive. Many providers work out of this office and patients are here for different procedures. Our goal is to serve you as quickly as possible. 3. We acknowledge that your time is valuable. Please remember that wound healing takes time and we appreciate your understanding that the length of each patients appointment will vary depending upon their need. 4. It is for your protection that we ask for insurance cards, photo ID, and new consent forms on your first visit and periodically throughout your treatment at all our facilities. 5. Wound Care treatment is known to be most effective when provided on a regular basis. Missed appointments, and not following the recommended plan of care can result in ineffective treatment and a poor outcome. If you find it difficult to keep appointments or to follow the recommended plan of care, it is your responsibility to let the staff know, so that we can work with you toward a solution. 6. If you need to miss an appointment, please call to let us know. We expect 24 hours notice for all cancellations.  We also expect missed visits to be rescheduled as soon as possible, preferably within the same week to promote the most effective healing time for your wound(s). 7. If you will be late for an appointment, please call our center to be sure that the provider can still see you when you arrive. If you are more than 15 minutes late your appointment may need to be rescheduled. 8. If two (2) appointments are missed without notifying us, your care plan may be discontinued. The same may happen if multiple visits are cancelled or rescheduled, even with notice. A missed visit is time when another patient, who also needs care, could have been seen. Thank you for your understanding and consideration.

## 2021-05-04 NOTE — PROGRESS NOTES
[address] [facility]    IV Antibiotic Visit    NAME:  Brisa Armas  YOB: 1970  MEDICAL RECORD NUMBER:  [id number]  DATE:  5/4/2021    Patient arrived to [facility] 58   [] per wheelchair   [x] ambulatory     Patient was seen in [address] [place] [address] this morning and according to notes wound is looking good. He will be seen in Wound Clinic next Tues 5/11/21    Itching: No  Rash: No  Mouth Sores: No  Nausea: No  Vomiting: No  Diarrhea: No  Night Sweats: No  Fever: No    Administered: [] Peripheral access    [] PICC access    [] Port access    No Known Allergies    Name of Antibiotic Administered: Ertapenem  Dose of Antibiotic Administered: 1 Gram.    Indication for Antibiotic:  Scrotal abcess      Response to treatment:  Well tolerated by patient.        Scheduled to return for next antibiotic dose tomorrow     Electronically signed by Royal Daniel RN on 5/4/2021 at 10:55 AM

## 2021-05-05 ENCOUNTER — HOSPITAL ENCOUNTER (OUTPATIENT)
Dept: INFUSION THERAPY | Age: 51
Setting detail: INFUSION SERIES
Discharge: HOME | End: 2021-05-05
Payer: COMMERCIAL

## 2021-05-05 VITALS
HEIGHT: 72 IN | TEMPERATURE: 98.8 F | RESPIRATION RATE: 18 BRPM | BODY MASS INDEX: 32.64 KG/M2 | DIASTOLIC BLOOD PRESSURE: 61 MMHG | HEART RATE: 103 BPM | SYSTOLIC BLOOD PRESSURE: 100 MMHG | WEIGHT: 241 LBS | OXYGEN SATURATION: 98 %

## 2021-05-05 DIAGNOSIS — N50.89 SCROTAL SWELLING: ICD-10-CM

## 2021-05-05 DIAGNOSIS — N49.2 SCROTAL INFECTION: Primary | ICD-10-CM

## 2021-05-05 DIAGNOSIS — A41.9 SEPSIS WITHOUT ACUTE ORGAN DYSFUNCTION, DUE TO UNSPECIFIED ORGANISM (HCC): ICD-10-CM

## 2021-05-05 PROCEDURE — 2580000003 HC RX 258: Performed by: INTERNAL MEDICINE

## 2021-05-05 PROCEDURE — 96365 THER/PROPH/DIAG IV INF INIT: CPT

## 2021-05-05 PROCEDURE — 6360000002 HC RX W HCPCS: Performed by: INTERNAL MEDICINE

## 2021-05-05 RX ORDER — SODIUM CHLORIDE 9 MG/ML
INJECTION, SOLUTION INTRAVENOUS CONTINUOUS
Status: CANCELLED | OUTPATIENT
Start: 2021-05-06

## 2021-05-05 RX ORDER — DIPHENHYDRAMINE HYDROCHLORIDE 50 MG/ML
50 INJECTION INTRAMUSCULAR; INTRAVENOUS ONCE
Status: CANCELLED | OUTPATIENT
Start: 2021-05-06 | End: 2021-05-06

## 2021-05-05 RX ORDER — HEPARIN SODIUM (PORCINE) LOCK FLUSH IV SOLN 100 UNIT/ML 100 UNIT/ML
500 SOLUTION INTRAVENOUS PRN
Status: CANCELLED | OUTPATIENT
Start: 2021-05-06

## 2021-05-05 RX ORDER — SODIUM CHLORIDE 9 MG/ML
25 INJECTION, SOLUTION INTRAVENOUS PRN
Status: CANCELLED | OUTPATIENT
Start: 2021-05-06

## 2021-05-05 RX ORDER — SODIUM CHLORIDE 0.9 % (FLUSH) 0.9 %
5-40 SYRINGE (ML) INJECTION PRN
Status: DISCONTINUED | OUTPATIENT
Start: 2021-05-05 | End: 2021-05-06 | Stop reason: HOSPADM

## 2021-05-05 RX ORDER — SODIUM CHLORIDE 0.9 % (FLUSH) 0.9 %
5-40 SYRINGE (ML) INJECTION PRN
Status: CANCELLED | OUTPATIENT
Start: 2021-05-06

## 2021-05-05 RX ORDER — EPINEPHRINE 1 MG/ML
0.3 INJECTION, SOLUTION, CONCENTRATE INTRAVENOUS PRN
Status: CANCELLED | OUTPATIENT
Start: 2021-05-06

## 2021-05-05 RX ORDER — METHYLPREDNISOLONE SODIUM SUCCINATE 125 MG/2ML
125 INJECTION, POWDER, LYOPHILIZED, FOR SOLUTION INTRAMUSCULAR; INTRAVENOUS ONCE
Status: CANCELLED | OUTPATIENT
Start: 2021-05-06 | End: 2021-05-06

## 2021-05-05 RX ADMIN — Medication 10 ML: at 13:15

## 2021-05-05 RX ADMIN — ERTAPENEM SODIUM 1000 MG: 1 INJECTION, POWDER, LYOPHILIZED, FOR SOLUTION INTRAMUSCULAR; INTRAVENOUS at 13:21

## 2021-05-05 RX ADMIN — Medication 20 ML: at 13:57

## 2021-05-05 NOTE — PROGRESS NOTES
16[address] [facility] Antibiotic Visit    NAME:  Lauren Wadsworth  YOB: 1970  MEDICAL RECORD NUMBER:  [id number]  DATE:  5/5/2021    Patient arrived to [place] [address]   [] per wheelchair   [x] ambulatory     Itching: No  Rash: No  Mouth Sores: No  Nausea: No  Vomiting: No  Diarrhea: No  Night Sweats: No  Fever: No    Administered: [] Peripheral access    [x] PICC access    [] Port access    No Known Allergies    Name of Antibiotic Administered: invanz  Dose of Antibiotic Administered: 1000 mg. Indication for Antibiotic: UTI      Response to treatment:  Well tolerated by patient.        Scheduled to return for next antibiotic dose tomorrow     Electronically signed by Alexei Montemayor RN on 5/5/2021 at 1:56 PM

## 2021-05-06 ENCOUNTER — HOSPITAL ENCOUNTER (OUTPATIENT)
Dept: INFUSION THERAPY | Age: 51
Setting detail: INFUSION SERIES
Discharge: HOME | End: 2021-05-06
Payer: COMMERCIAL

## 2021-05-06 VITALS
SYSTOLIC BLOOD PRESSURE: 108 MMHG | TEMPERATURE: 97.6 F | OXYGEN SATURATION: 96 % | HEART RATE: 106 BPM | DIASTOLIC BLOOD PRESSURE: 71 MMHG

## 2021-05-06 DIAGNOSIS — N49.2 SCROTAL INFECTION: Primary | ICD-10-CM

## 2021-05-06 DIAGNOSIS — N50.89 SCROTAL SWELLING: ICD-10-CM

## 2021-05-06 DIAGNOSIS — A41.9 SEPSIS WITHOUT ACUTE ORGAN DYSFUNCTION, DUE TO UNSPECIFIED ORGANISM (HCC): ICD-10-CM

## 2021-05-06 PROCEDURE — 2580000003 HC RX 258: Performed by: INTERNAL MEDICINE

## 2021-05-06 PROCEDURE — 6360000002 HC RX W HCPCS: Performed by: INTERNAL MEDICINE

## 2021-05-06 PROCEDURE — 96365 THER/PROPH/DIAG IV INF INIT: CPT

## 2021-05-06 RX ORDER — SODIUM CHLORIDE 9 MG/ML
25 INJECTION, SOLUTION INTRAVENOUS PRN
Status: CANCELLED | OUTPATIENT
Start: 2021-05-07

## 2021-05-06 RX ORDER — METHYLPREDNISOLONE SODIUM SUCCINATE 125 MG/2ML
125 INJECTION, POWDER, LYOPHILIZED, FOR SOLUTION INTRAMUSCULAR; INTRAVENOUS ONCE
Status: CANCELLED | OUTPATIENT
Start: 2021-05-07 | End: 2021-05-07

## 2021-05-06 RX ORDER — EPINEPHRINE 1 MG/ML
0.3 INJECTION, SOLUTION, CONCENTRATE INTRAVENOUS PRN
Status: CANCELLED | OUTPATIENT
Start: 2021-05-07

## 2021-05-06 RX ORDER — SODIUM CHLORIDE 9 MG/ML
INJECTION, SOLUTION INTRAVENOUS CONTINUOUS
Status: CANCELLED | OUTPATIENT
Start: 2021-05-07

## 2021-05-06 RX ORDER — SODIUM CHLORIDE 0.9 % (FLUSH) 0.9 %
5-40 SYRINGE (ML) INJECTION PRN
Status: CANCELLED | OUTPATIENT
Start: 2021-05-07

## 2021-05-06 RX ORDER — HEPARIN SODIUM (PORCINE) LOCK FLUSH IV SOLN 100 UNIT/ML 100 UNIT/ML
500 SOLUTION INTRAVENOUS PRN
Status: CANCELLED | OUTPATIENT
Start: 2021-05-07

## 2021-05-06 RX ORDER — DIPHENHYDRAMINE HYDROCHLORIDE 50 MG/ML
50 INJECTION INTRAMUSCULAR; INTRAVENOUS ONCE
Status: CANCELLED | OUTPATIENT
Start: 2021-05-07 | End: 2021-05-07

## 2021-05-06 RX ORDER — SODIUM CHLORIDE 0.9 % (FLUSH) 0.9 %
5-40 SYRINGE (ML) INJECTION PRN
Status: DISCONTINUED | OUTPATIENT
Start: 2021-05-06 | End: 2021-05-07 | Stop reason: HOSPADM

## 2021-05-06 RX ADMIN — Medication 10 ML: at 13:18

## 2021-05-06 RX ADMIN — Medication 30 ML: at 13:51

## 2021-05-06 RX ADMIN — ERTAPENEM SODIUM 1000 MG: 1 INJECTION, POWDER, LYOPHILIZED, FOR SOLUTION INTRAMUSCULAR; INTRAVENOUS at 13:20

## 2021-05-06 NOTE — PROGRESS NOTES
16[address] [facility] Antibiotic Visit    NAME:  Florence Carvajal  YOB: 1970  MEDICAL RECORD NUMBER:  [id number]  DATE:  5/6/2021    Patient arrived to [facility] 58   [] per wheelchair   [x] ambulatory     Itching: No  Rash: No  Mouth Sores: No  Nausea: No  Vomiting: No  Diarrhea: No  Night Sweats: No  Fever: No    Administered: [] Peripheral access    [x] PICC access    [] Port access    No Known Allergies    Name of Antibiotic Administered: Ertapenem  Dose of Antibiotic Administered: 1 gram     Indication for Antibiotic: scrotal abcess      Response to treatment:  Well tolerated by patient.        Scheduled to return for next antibiotic dose tomorrow    Patient is due to see urologist tomorrow at 10:50 am.     Electronically signed by Ernesto Dickinson RN on 5/6/2021 at 1:25 PM

## 2021-05-07 ENCOUNTER — HOSPITAL ENCOUNTER (OUTPATIENT)
Dept: INFUSION THERAPY | Age: 51
Setting detail: INFUSION SERIES
Discharge: HOME | End: 2021-05-07
Payer: COMMERCIAL

## 2021-05-07 VITALS
HEART RATE: 101 BPM | DIASTOLIC BLOOD PRESSURE: 64 MMHG | RESPIRATION RATE: 17 BRPM | SYSTOLIC BLOOD PRESSURE: 88 MMHG | TEMPERATURE: 96.6 F | OXYGEN SATURATION: 97 %

## 2021-05-07 DIAGNOSIS — N49.2 SCROTAL INFECTION: Primary | ICD-10-CM

## 2021-05-07 DIAGNOSIS — A41.9 SEPSIS WITHOUT ACUTE ORGAN DYSFUNCTION, DUE TO UNSPECIFIED ORGANISM (HCC): ICD-10-CM

## 2021-05-07 DIAGNOSIS — N50.89 SCROTAL SWELLING: ICD-10-CM

## 2021-05-07 LAB
ANION GAP SERPL CALCULATED.3IONS-SCNC: 8 MMOL/L (ref 3–16)
BASOPHILS ABSOLUTE: 0 K/UL (ref 0–0.2)
BASOPHILS RELATIVE PERCENT: 0.5 %
BUN BLDV-MCNC: 16 MG/DL (ref 7–20)
CALCIUM SERPL-MCNC: 9.1 MG/DL (ref 8.3–10.6)
CHLORIDE BLD-SCNC: 101 MMOL/L (ref 99–110)
CO2: 26 MMOL/L (ref 21–32)
CREAT SERPL-MCNC: 0.7 MG/DL (ref 0.9–1.3)
EOSINOPHILS ABSOLUTE: 0.2 K/UL (ref 0–0.6)
EOSINOPHILS RELATIVE PERCENT: 1.7 %
GFR AFRICAN AMERICAN: >60
GFR NON-AFRICAN AMERICAN: >60
GLUCOSE BLD-MCNC: 107 MG/DL (ref 70–99)
HCT VFR BLD CALC: 34.1 % (ref 40.5–52.5)
HEMOGLOBIN: 11.4 G/DL (ref 13.5–17.5)
LYMPHOCYTES ABSOLUTE: 1.5 K/UL (ref 1–5.1)
LYMPHOCYTES RELATIVE PERCENT: 16.7 %
MCH RBC QN AUTO: 27.7 PG (ref 26–34)
MCHC RBC AUTO-ENTMCNC: 33.3 G/DL (ref 31–36)
MCV RBC AUTO: 82.9 FL (ref 80–100)
MONOCYTES ABSOLUTE: 0.5 K/UL (ref 0–1.3)
MONOCYTES RELATIVE PERCENT: 5.4 %
NEUTROPHILS ABSOLUTE: 6.8 K/UL (ref 1.7–7.7)
NEUTROPHILS RELATIVE PERCENT: 75.7 %
PDW BLD-RTO: 14.4 % (ref 12.4–15.4)
PLATELET # BLD: 329 K/UL (ref 135–450)
PMV BLD AUTO: 9.6 FL (ref 5–10.5)
POTASSIUM SERPL-SCNC: 4.5 MMOL/L (ref 3.5–5.1)
RBC # BLD: 4.12 M/UL (ref 4.2–5.9)
SODIUM BLD-SCNC: 135 MMOL/L (ref 136–145)
WBC # BLD: 9 K/UL (ref 4–11)

## 2021-05-07 PROCEDURE — 36592 COLLECT BLOOD FROM PICC: CPT

## 2021-05-07 PROCEDURE — 85025 COMPLETE CBC W/AUTO DIFF WBC: CPT

## 2021-05-07 PROCEDURE — 96365 THER/PROPH/DIAG IV INF INIT: CPT

## 2021-05-07 PROCEDURE — 80048 BASIC METABOLIC PNL TOTAL CA: CPT

## 2021-05-07 PROCEDURE — 6360000002 HC RX W HCPCS: Performed by: INTERNAL MEDICINE

## 2021-05-07 PROCEDURE — 2580000003 HC RX 258: Performed by: INTERNAL MEDICINE

## 2021-05-07 RX ORDER — METHYLPREDNISOLONE SODIUM SUCCINATE 125 MG/2ML
125 INJECTION, POWDER, LYOPHILIZED, FOR SOLUTION INTRAMUSCULAR; INTRAVENOUS ONCE
Status: CANCELLED | OUTPATIENT
Start: 2021-05-08 | End: 2021-05-08

## 2021-05-07 RX ORDER — SODIUM CHLORIDE 0.9 % (FLUSH) 0.9 %
5-40 SYRINGE (ML) INJECTION PRN
Status: CANCELLED | OUTPATIENT
Start: 2021-05-08

## 2021-05-07 RX ORDER — SODIUM CHLORIDE 9 MG/ML
25 INJECTION, SOLUTION INTRAVENOUS PRN
Status: CANCELLED | OUTPATIENT
Start: 2021-05-08

## 2021-05-07 RX ORDER — SODIUM CHLORIDE 0.9 % (FLUSH) 0.9 %
5-40 SYRINGE (ML) INJECTION PRN
Status: DISCONTINUED | OUTPATIENT
Start: 2021-05-07 | End: 2021-05-08 | Stop reason: HOSPADM

## 2021-05-07 RX ORDER — HEPARIN SODIUM (PORCINE) LOCK FLUSH IV SOLN 100 UNIT/ML 100 UNIT/ML
500 SOLUTION INTRAVENOUS PRN
Status: CANCELLED | OUTPATIENT
Start: 2021-05-08

## 2021-05-07 RX ORDER — DIPHENHYDRAMINE HYDROCHLORIDE 50 MG/ML
50 INJECTION INTRAMUSCULAR; INTRAVENOUS ONCE
Status: CANCELLED | OUTPATIENT
Start: 2021-05-08 | End: 2021-05-08

## 2021-05-07 RX ORDER — SODIUM CHLORIDE 9 MG/ML
INJECTION, SOLUTION INTRAVENOUS CONTINUOUS
Status: CANCELLED | OUTPATIENT
Start: 2021-05-08

## 2021-05-07 RX ORDER — EPINEPHRINE 1 MG/ML
0.3 INJECTION, SOLUTION, CONCENTRATE INTRAVENOUS PRN
Status: CANCELLED | OUTPATIENT
Start: 2021-05-08

## 2021-05-07 RX ADMIN — Medication 10 ML: at 14:25

## 2021-05-07 RX ADMIN — Medication 20 ML: at 15:04

## 2021-05-07 RX ADMIN — ERTAPENEM SODIUM 1000 MG: 1 INJECTION, POWDER, LYOPHILIZED, FOR SOLUTION INTRAMUSCULAR; INTRAVENOUS at 14:25

## 2021-05-07 NOTE — PROGRESS NOTES
16[address] [facility] Antibiotic Visit    NAME:  Darrin Busby  YOB: 1970  MEDICAL RECORD NUMBER:  [id number]  DATE:  5/7/2021    Patient arrived to [place] [address]   [] per wheelchair   [x] ambulatory     Itching: No  Rash: No  Mouth Sores: No  Nausea: No  Vomiting: No  Diarrhea: No  Night Sweats: No  Fever: No    Administered: [] Peripheral access    [x] PICC access    [] Port access    No Known Allergies    Name of Antibiotic Administered: invanz  Dose of Antibiotic Administered: 1000 mg. Indication for Antibiotic: UTI      Response to treatment:  Well tolerated by patient. Scheduled to return for next antibiotic dose tomorrow     Electronically signed by Amadou Mcmahon RN on 5/7/2021 at 2:33 PM        Outpatient [place] [address]    PICC Lab Draw    NAME:  Satish Santana OF BIRTH:  1970  MEDICAL RECORD NUMBER:  [id number]  DATE:  5/7/2021    Patient arrived to [place] [address]   [] per wheelchair   [x] ambulatory     PICC Location:right arm    PICC Site:  Redness: No  Bruising: No   Edema: No  Pain: No     PICC Labs  Cap cleansed with Chloroprep Scrub for 30 seconds and air dried completely for 1 minute on sterile 4X4: Yes  Cap removed: Yes   Blood drawn using a 10/ml syringe  Amount of blood wasted 10/ml syringe    Labs Obtained: Yes  Lab Test(s) Ordered: CBC BMP  PICC Flushed with 20ml/NS: Yes  New Cap applied: Yes     Response to treatment:  Well tolerated by patient.       Electronically signed by Amadou Mcmahon RN on 5/7/2021 at 2:34 PM

## 2021-05-10 ENCOUNTER — HOSPITAL ENCOUNTER (OUTPATIENT)
Dept: INFUSION THERAPY | Age: 51
Setting detail: INFUSION SERIES
Discharge: HOME | End: 2021-05-10
Payer: COMMERCIAL

## 2021-05-10 VITALS
TEMPERATURE: 98.3 F | HEART RATE: 89 BPM | RESPIRATION RATE: 16 BRPM | SYSTOLIC BLOOD PRESSURE: 116 MMHG | OXYGEN SATURATION: 97 % | DIASTOLIC BLOOD PRESSURE: 72 MMHG

## 2021-05-10 DIAGNOSIS — N49.2 SCROTAL INFECTION: Primary | ICD-10-CM

## 2021-05-10 DIAGNOSIS — N50.89 SCROTAL SWELLING: ICD-10-CM

## 2021-05-10 DIAGNOSIS — A41.9 SEPSIS WITHOUT ACUTE ORGAN DYSFUNCTION, DUE TO UNSPECIFIED ORGANISM (HCC): ICD-10-CM

## 2021-05-10 PROCEDURE — 2580000003 HC RX 258: Performed by: INTERNAL MEDICINE

## 2021-05-10 PROCEDURE — G0463 HOSPITAL OUTPT CLINIC VISIT: HCPCS

## 2021-05-10 PROCEDURE — 96365 THER/PROPH/DIAG IV INF INIT: CPT

## 2021-05-10 PROCEDURE — 99212 OFFICE O/P EST SF 10 MIN: CPT

## 2021-05-10 PROCEDURE — 6360000002 HC RX W HCPCS: Performed by: INTERNAL MEDICINE

## 2021-05-10 RX ORDER — EPINEPHRINE 1 MG/ML
0.3 INJECTION, SOLUTION, CONCENTRATE INTRAVENOUS PRN
Status: CANCELLED | OUTPATIENT
Start: 2021-05-11

## 2021-05-10 RX ORDER — HEPARIN SODIUM (PORCINE) LOCK FLUSH IV SOLN 100 UNIT/ML 100 UNIT/ML
500 SOLUTION INTRAVENOUS PRN
Status: CANCELLED | OUTPATIENT
Start: 2021-05-11

## 2021-05-10 RX ORDER — DIPHENHYDRAMINE HYDROCHLORIDE 50 MG/ML
50 INJECTION INTRAMUSCULAR; INTRAVENOUS ONCE
Status: CANCELLED | OUTPATIENT
Start: 2021-05-11 | End: 2021-05-11

## 2021-05-10 RX ORDER — METHYLPREDNISOLONE SODIUM SUCCINATE 125 MG/2ML
125 INJECTION, POWDER, LYOPHILIZED, FOR SOLUTION INTRAMUSCULAR; INTRAVENOUS ONCE
Status: CANCELLED | OUTPATIENT
Start: 2021-05-11 | End: 2021-05-11

## 2021-05-10 RX ORDER — SODIUM CHLORIDE 9 MG/ML
INJECTION, SOLUTION INTRAVENOUS CONTINUOUS
Status: CANCELLED | OUTPATIENT
Start: 2021-05-11

## 2021-05-10 RX ORDER — SODIUM CHLORIDE 9 MG/ML
25 INJECTION, SOLUTION INTRAVENOUS PRN
Status: CANCELLED | OUTPATIENT
Start: 2021-05-11

## 2021-05-10 RX ORDER — SODIUM CHLORIDE 0.9 % (FLUSH) 0.9 %
5-40 SYRINGE (ML) INJECTION PRN
Status: CANCELLED | OUTPATIENT
Start: 2021-05-11

## 2021-05-10 RX ORDER — SODIUM CHLORIDE 0.9 % (FLUSH) 0.9 %
5-40 SYRINGE (ML) INJECTION PRN
Status: DISCONTINUED | OUTPATIENT
Start: 2021-05-10 | End: 2021-05-11 | Stop reason: HOSPADM

## 2021-05-10 RX ADMIN — Medication 10 ML: at 13:32

## 2021-05-10 RX ADMIN — Medication 30 ML: at 14:04

## 2021-05-10 RX ADMIN — Medication 10 ML: at 13:18

## 2021-05-10 RX ADMIN — ERTAPENEM SODIUM 1000 MG: 1 INJECTION, POWDER, LYOPHILIZED, FOR SOLUTION INTRAMUSCULAR; INTRAVENOUS at 13:32

## 2021-05-10 ASSESSMENT — PAIN SCALES - GENERAL
PAINLEVEL_OUTOF10: 0
PAINLEVEL_OUTOF10: 0

## 2021-05-10 NOTE — PROGRESS NOTES
1633 \[facility]\""    IV Antibiotic Visit with PICC line dressing change    NAME:  Autumn Moran  YOB: 1970  MEDICAL RECORD NUMBER:  [id number]  DATE:  5/10/2021    Patient arrived to [facility] 58   [] per wheelchair   [x] ambulatory     Patient to Outpatient Infusion for Invanz IV due to scrotal infection/wound. Patient alert and oriented x 4. Patient has a suprapubic catheter in place due to this infection. Patient is complaining of \"pulling sensation\" near catheter insertion site. Upon inspection, catheter was being pulled due to the catheter being full and that tape was also being pulled. Dressing removed from suprapubic site and a moderate amount of old, dried, bloody drainage was cleansed from site. New gauze dressing was applied and catheter was taped to dressing to prevent it from being pulled. Patient was instructed on how to do dressing and he verbalized understanding. Patient instructed to call his urologist re: catheter care and he verbalized understanding. PICC Dressing Change    PICC Location: right arm/ight basillic    PICC Site:  Redness: No  Bruising: No   Edema: No  Pain: No     PICC Cleansed:  Current dressing and stat lock removed: Yes  Chloroprep Scrub for 30 seconds and air dried completely for 1 minute: Yes     PICC Dressing Change  Skin barrier: Yes  Stat lock applied and PICC line secured Yes  Biopatch applied: Yes   PICC site covered with Tegaderm Yes  Date and initials applied to PICC dressing Yes    Next Dressing Due: May 17, 2021. IV Antibiotic Visit    Itching: No  Rash: No  Mouth Sores: No  Nausea: No  Vomiting: No  Diarrhea: No  Night Sweats: No  Fever: No    Administered: [] Peripheral access    [x] PICC access - right arm/right basillic    [] Port access    No Known Allergies    Name of Antibiotic Administered: Invanz  Dose of Antibiotic Administered: 1000 mg.     Indication for Antibiotic: Scrotal infection and sepsis     Response to treatment:  Well tolerated by patient. Scheduled to return for next antibiotic dose tomorrow     Clinic Level of Care Assessment  Infusion Center      NAME:  Ivan Rodarte  YOB: 1970 GENDER: male  MEDICAL RECORD NUMBER:  [id number]   DATE:  5/10/2021     Ambulation Status Document in Daily Care/Safety Tab   Status Definition Points   Independent Independently able to ambulate. Fully able (without any assistance) to get on/off exam table/chair. [x]   0   Minimal Physical Assistance Requires physical assistance of one person to ambulate and/or position patient to be examined. Includes necessary physical assistance to position lower extremities on/off stool. []   1   Moderate Physical Assistance Requires at least one staff member to physically assist patient in ambulating into treatment room, and/or on off chair/bed. Requires assistance to bathroom. []   2   Full Assistance Requires assistance of at least two staff members to transfer patient into treatment room and/or on/off bed/chair. \"Total Transfer\". Unable to use bathroom requires bedside commode and/or bedpan []   3       Dressing Complexity Document in LDA Navigator  Complexity Definition Points   No Dressing  []   0   Simple Minimal, simple dressing. i.e. bandaid, gauze, simple wrap. Peripheral IV dressing. []   1   Intermediate Moderately complicated PICC dressing change requiring sterile procedure and site assessment. [x]   2   Complex Complicated dressing change port access requiring sterile procedure and site assessment. []   3       Teaching Effort Document in AVS and/or Education Navigator  -  Education documented in Focus note  Effort Definition Points   No Teaching  []   0   Simple Teach two or less topics. Teaching documented in discharge instructions in AVS and copy given to patient. [x]   1   Intermediate Teach three to four topics.   Teaching documented in Discharge Instructions in AVS using References and Attachments. Copy given to patient. []   2   Complex Teach five to six topics. Teaching documented in Discharge Instructions in AVS using References and Attachments. May also be documentation in Education Navigator. Copy given to patient. []   3           Patient Assessment  Planning Definition Points   Simple Complete all tabs in patient assessment navigator. Review or complete patient'sTherapy Plan. [x]   1   Intermediate Complete all tabs in patient assessment navigator. Review or completed patient'sTherapy Plan. Contact doctor based on nursing assessment. []   2   Complex Complete all tabs in patient assessment navigator. Completed patient's Therapy Plan. Contact doctor based on nursing assessment and write order related to needed care. []   3     Patient Planning   Planning Definition Points   Simple Discharged, instructions and After Visit Summary given to patient/caregiver and instructions completed. Simple follow-up with routine assessment and planning.      []   1   Intermediate Discharged, instructions and After Visit Summary given to patient/caregiver and instructions completed. Contact with outside resources; i.e. Telephone calls to PCP, home health, ECF and/or arranging transportation. []   2   Complex Discharged, instructions and After Visit Summary given to patient/caregiver and instructions completed. Contact with outside resources; i.e. Telephone calls to PCP, home health, ECF and/or arranging transportation. May include filling out forms and/or writing letters, communication with insurance , preauthorization for treatment.    []   3       Is this the Patient's First Visit to the [facility]  No    Is this Patient Established @ this [facility] SPECIALTY [facility]  Yes              Clinical Level of Care      Points  0-2  Level 1 []     Points  3-5  Level 2 [x]     Points  6-9  Level 3 []     Points  10-12  Level 4 []     Points  13-15  Level 5 [] Electronically signed by Rancho Renner RN on 5/10/2021 at 5:18 PM

## 2021-05-11 ENCOUNTER — TELEPHONE (OUTPATIENT)
Dept: INFECTIOUS DISEASES | Age: 51
End: 2021-05-11

## 2021-05-11 ENCOUNTER — HOSPITAL ENCOUNTER (OUTPATIENT)
Dept: WOUND CARE | Age: 51
Discharge: HOME | End: 2021-05-11
Payer: COMMERCIAL

## 2021-05-11 ENCOUNTER — HOSPITAL ENCOUNTER (OUTPATIENT)
Dept: INFUSION THERAPY | Age: 51
Setting detail: INFUSION SERIES
Discharge: HOME | End: 2021-05-11
Payer: COMMERCIAL

## 2021-05-11 DIAGNOSIS — A41.9 SEPSIS WITHOUT ACUTE ORGAN DYSFUNCTION, DUE TO UNSPECIFIED ORGANISM (HCC): ICD-10-CM

## 2021-05-11 DIAGNOSIS — N50.89 SCROTAL SWELLING: ICD-10-CM

## 2021-05-11 DIAGNOSIS — N49.2 SCROTAL INFECTION: Primary | ICD-10-CM

## 2021-05-11 LAB
ANION GAP SERPL CALCULATED.3IONS-SCNC: 9 MMOL/L (ref 3–16)
BASOPHILS ABSOLUTE: 0 K/UL (ref 0–0.2)
BASOPHILS RELATIVE PERCENT: 0.4 %
BUN BLDV-MCNC: 13 MG/DL (ref 7–20)
CALCIUM SERPL-MCNC: 8.9 MG/DL (ref 8.3–10.6)
CHLORIDE BLD-SCNC: 102 MMOL/L (ref 99–110)
CO2: 27 MMOL/L (ref 21–32)
CREAT SERPL-MCNC: 0.6 MG/DL (ref 0.9–1.3)
EOSINOPHILS ABSOLUTE: 0.3 K/UL (ref 0–0.6)
EOSINOPHILS RELATIVE PERCENT: 4.8 %
GFR AFRICAN AMERICAN: >60
GFR NON-AFRICAN AMERICAN: >60
GLUCOSE BLD-MCNC: 107 MG/DL (ref 70–99)
HCT VFR BLD CALC: 32.8 % (ref 40.5–52.5)
HEMOGLOBIN: 10.9 G/DL (ref 13.5–17.5)
LYMPHOCYTES ABSOLUTE: 1.5 K/UL (ref 1–5.1)
LYMPHOCYTES RELATIVE PERCENT: 26 %
MCH RBC QN AUTO: 27.7 PG (ref 26–34)
MCHC RBC AUTO-ENTMCNC: 33.4 G/DL (ref 31–36)
MCV RBC AUTO: 83 FL (ref 80–100)
MONOCYTES ABSOLUTE: 0.4 K/UL (ref 0–1.3)
MONOCYTES RELATIVE PERCENT: 6.8 %
NEUTROPHILS ABSOLUTE: 3.6 K/UL (ref 1.7–7.7)
NEUTROPHILS RELATIVE PERCENT: 62 %
PDW BLD-RTO: 14.3 % (ref 12.4–15.4)
PLATELET # BLD: 235 K/UL (ref 135–450)
PMV BLD AUTO: 9.9 FL (ref 5–10.5)
POTASSIUM SERPL-SCNC: 4.4 MMOL/L (ref 3.5–5.1)
RBC # BLD: 3.95 M/UL (ref 4.2–5.9)
SODIUM BLD-SCNC: 138 MMOL/L (ref 136–145)
WBC # BLD: 5.8 K/UL (ref 4–11)

## 2021-05-11 PROCEDURE — 6360000002 HC RX W HCPCS: Performed by: INTERNAL MEDICINE

## 2021-05-11 PROCEDURE — 36592 COLLECT BLOOD FROM PICC: CPT

## 2021-05-11 PROCEDURE — 2580000003 HC RX 258: Performed by: INTERNAL MEDICINE

## 2021-05-11 PROCEDURE — 85025 COMPLETE CBC W/AUTO DIFF WBC: CPT

## 2021-05-11 PROCEDURE — 96365 THER/PROPH/DIAG IV INF INIT: CPT

## 2021-05-11 PROCEDURE — 80048 BASIC METABOLIC PNL TOTAL CA: CPT

## 2021-05-11 RX ORDER — SODIUM CHLORIDE 0.9 % (FLUSH) 0.9 %
5-40 SYRINGE (ML) INJECTION PRN
Status: DISCONTINUED | OUTPATIENT
Start: 2021-05-11 | End: 2021-05-12 | Stop reason: HOSPADM

## 2021-05-11 RX ORDER — SODIUM CHLORIDE 0.9 % (FLUSH) 0.9 %
5-40 SYRINGE (ML) INJECTION PRN
Status: CANCELLED | OUTPATIENT
Start: 2021-05-12

## 2021-05-11 RX ORDER — DIPHENHYDRAMINE HYDROCHLORIDE 50 MG/ML
50 INJECTION INTRAMUSCULAR; INTRAVENOUS ONCE
Status: CANCELLED | OUTPATIENT
Start: 2021-05-12 | End: 2021-05-12

## 2021-05-11 RX ORDER — HEPARIN SODIUM (PORCINE) LOCK FLUSH IV SOLN 100 UNIT/ML 100 UNIT/ML
500 SOLUTION INTRAVENOUS PRN
Status: CANCELLED | OUTPATIENT
Start: 2021-05-12

## 2021-05-11 RX ORDER — SODIUM CHLORIDE 9 MG/ML
25 INJECTION, SOLUTION INTRAVENOUS PRN
Status: CANCELLED | OUTPATIENT
Start: 2021-05-12

## 2021-05-11 RX ORDER — SODIUM CHLORIDE 9 MG/ML
INJECTION, SOLUTION INTRAVENOUS CONTINUOUS
Status: CANCELLED | OUTPATIENT
Start: 2021-05-12

## 2021-05-11 RX ORDER — EPINEPHRINE 1 MG/ML
0.3 INJECTION, SOLUTION, CONCENTRATE INTRAVENOUS PRN
Status: CANCELLED | OUTPATIENT
Start: 2021-05-12

## 2021-05-11 RX ORDER — METHYLPREDNISOLONE SODIUM SUCCINATE 125 MG/2ML
125 INJECTION, POWDER, LYOPHILIZED, FOR SOLUTION INTRAMUSCULAR; INTRAVENOUS ONCE
Status: CANCELLED | OUTPATIENT
Start: 2021-05-12 | End: 2021-05-12

## 2021-05-11 RX ADMIN — Medication 30 ML: at 14:06

## 2021-05-11 RX ADMIN — ERTAPENEM SODIUM 1000 MG: 1 INJECTION, POWDER, LYOPHILIZED, FOR SOLUTION INTRAMUSCULAR; INTRAVENOUS at 14:00

## 2021-05-11 RX ADMIN — Medication 20 ML: at 14:34

## 2021-05-11 NOTE — TELEPHONE ENCOUNTER
Nurse called asking about PICC removal and infusion discharging because the term date is tomorrow (05/12/2021). I looked into the chart and realized  yhat they haven't sent labs since 04/27, I asked for labs, and nurse [facility] said she would get labs done and fax it to review. Will record the labs in flow sheet after she sends it.

## 2021-05-11 NOTE — PROGRESS NOTES
16[address] [facility]    PICC Lab Draw    NAME:  Larry Plummer  YOB: 1970  MEDICAL RECORD NUMBER:  [id number]  DATE:  5/11/2021    Patient arrived to [facility] 58   [] per wheelchair   [x] ambulatory     PICC Location:right arm    PICC Site:  Redness: No  Bruising: No   Edema: No  Pain: No     PICC Labs  Cap cleansed with Chloroprep Scrub for 30 seconds and air dried completely for 1 minute on sterile 4X4: Yes  Blood drawn using a 10/ml syringe  Amount of blood wasted 10/ml syringe    Labs Obtained: Yes  Lab Test(s) Ordered: CBC with diff and BMP  PICC Flushed with 30ml/NS: Yes  New Cap applied: Yes     Response to treatment:  Well tolerated by patient.       Electronically signed by Maura Kanner, RN on 5/11/2021 at 2:07 PM

## 2021-05-11 NOTE — PROGRESS NOTES
[address] [facility] Antibiotic Visit    NAME:  Marry Fox  YOB: 1970  MEDICAL RECORD NUMBER:  [id number]  DATE:  5/11/2021    Patient arrived to [facility]   [] per wheelchair   [x] ambulatory     Itching: No  Rash: No  Mouth Sores: No  Nausea: No  Vomiting: No  Diarrhea: No  Night Sweats: No  Fever: No    Administered: [] Peripheral access    [x] PICC access    [] Port access    No Known Allergies    Name of Antibiotic Administered: Ertapenem  Dose of Antibiotic Administered:  1 Gram   Indication for Antibiotic: Scrotal abcess      Response to treatment:  Well tolerated by patient. Called Dr. Paige Gibbons office to check on last dose due tomorrow. They wanted another set of labs done today. Patient had an appointment today with wound clinic but did not make it and he needs to drive out to [place] today to turn in papers to return to work.       Scheduled to return for next antibiotic dose tomorrow     Electronically signed by Janis Wilhelm RN on 5/11/2021 at 2:10 PM

## 2021-05-12 ENCOUNTER — TELEPHONE (OUTPATIENT)
Dept: INFECTIOUS DISEASES | Age: 51
End: 2021-05-12

## 2021-05-12 ENCOUNTER — HOSPITAL ENCOUNTER (OUTPATIENT)
Dept: INFUSION THERAPY | Age: 51
Setting detail: INFUSION SERIES
Discharge: HOME | End: 2021-05-12
Payer: COMMERCIAL

## 2021-05-12 DIAGNOSIS — N49.2 SCROTAL INFECTION: Primary | ICD-10-CM

## 2021-05-12 DIAGNOSIS — N50.89 SCROTAL SWELLING: ICD-10-CM

## 2021-05-12 DIAGNOSIS — A41.9 SEPSIS WITHOUT ACUTE ORGAN DYSFUNCTION, DUE TO UNSPECIFIED ORGANISM (HCC): ICD-10-CM

## 2021-05-12 PROCEDURE — 2580000003 HC RX 258: Performed by: INTERNAL MEDICINE

## 2021-05-12 PROCEDURE — 99212 OFFICE O/P EST SF 10 MIN: CPT

## 2021-05-12 PROCEDURE — 96365 THER/PROPH/DIAG IV INF INIT: CPT

## 2021-05-12 PROCEDURE — 6360000002 HC RX W HCPCS: Performed by: INTERNAL MEDICINE

## 2021-05-12 RX ORDER — METHYLPREDNISOLONE SODIUM SUCCINATE 125 MG/2ML
125 INJECTION, POWDER, LYOPHILIZED, FOR SOLUTION INTRAMUSCULAR; INTRAVENOUS ONCE
Status: CANCELLED | OUTPATIENT
Start: 2021-05-13 | End: 2021-05-13

## 2021-05-12 RX ORDER — EPINEPHRINE 1 MG/ML
0.3 INJECTION, SOLUTION, CONCENTRATE INTRAVENOUS PRN
Status: CANCELLED | OUTPATIENT
Start: 2021-05-13

## 2021-05-12 RX ORDER — SODIUM CHLORIDE 0.9 % (FLUSH) 0.9 %
5-40 SYRINGE (ML) INJECTION PRN
Status: CANCELLED | OUTPATIENT
Start: 2021-05-13

## 2021-05-12 RX ORDER — SODIUM CHLORIDE 9 MG/ML
25 INJECTION, SOLUTION INTRAVENOUS PRN
Status: CANCELLED | OUTPATIENT
Start: 2021-05-13

## 2021-05-12 RX ORDER — AMOXICILLIN AND CLAVULANATE POTASSIUM 875; 125 MG/1; MG/1
1 TABLET, FILM COATED ORAL 2 TIMES DAILY
Qty: 14 TABLET | Refills: 0 | Status: SHIPPED | OUTPATIENT
Start: 2021-05-12 | End: 2021-05-19

## 2021-05-12 RX ORDER — SODIUM CHLORIDE 9 MG/ML
INJECTION, SOLUTION INTRAVENOUS CONTINUOUS
Status: CANCELLED | OUTPATIENT
Start: 2021-05-13

## 2021-05-12 RX ORDER — HEPARIN SODIUM (PORCINE) LOCK FLUSH IV SOLN 100 UNIT/ML 100 UNIT/ML
500 SOLUTION INTRAVENOUS PRN
Status: CANCELLED | OUTPATIENT
Start: 2021-05-13

## 2021-05-12 RX ORDER — SODIUM CHLORIDE 0.9 % (FLUSH) 0.9 %
5-40 SYRINGE (ML) INJECTION PRN
Status: DISCONTINUED | OUTPATIENT
Start: 2021-05-12 | End: 2021-05-12

## 2021-05-12 RX ORDER — DIPHENHYDRAMINE HYDROCHLORIDE 50 MG/ML
50 INJECTION INTRAMUSCULAR; INTRAVENOUS ONCE
Status: CANCELLED | OUTPATIENT
Start: 2021-05-13 | End: 2021-05-13

## 2021-05-12 RX ADMIN — Medication 20 ML: at 13:45

## 2021-05-12 RX ADMIN — ERTAPENEM SODIUM 1000 MG: 1 INJECTION, POWDER, LYOPHILIZED, FOR SOLUTION INTRAMUSCULAR; INTRAVENOUS at 13:10

## 2021-05-12 RX ADMIN — Medication 10 ML: at 13:10

## 2021-05-12 NOTE — PROGRESS NOTES
Clinic Level of Care Assessment  Infusion Center      NAME:  Chaz Lyles  YOB: 1970 GENDER: male  MEDICAL RECORD NUMBER:  [id number]   DATE:  5/12/2021     Ambulation Status Document in Daily Care/Safety Tab   Status Definition Points   Independent Independently able to ambulate. Fully able (without any assistance) to get on/off exam table/chair. [x]   0   Minimal Physical Assistance Requires physical assistance of one person to ambulate and/or position patient to be examined. Includes necessary physical assistance to position lower extremities on/off stool. []   1   Moderate Physical Assistance Requires at least one staff member to physically assist patient in ambulating into treatment room, and/or on off chair/bed. Requires assistance to bathroom. []   2   Full Assistance Requires assistance of at least two staff members to transfer patient into treatment room and/or on/off bed/chair. \"Total Transfer\". Unable to use bathroom requires bedside commode and/or bedpan []   3       Dressing Complexity Document in LDA Navigator  Complexity Definition Points   No Dressing  []   0   Simple Minimal, simple dressing. i.e. bandaid, gauze, simple wrap. Peripheral IV dressing. []   1   Intermediate Moderately complicated PICC dressing change requiring sterile procedure and site assessment. [x]   2   Complex Complicated dressing change port access requiring sterile procedure and site assessment. []   3       Teaching Effort Document in AVS and/or Education Navigator    Effort Definition Points   No Teaching  []   0   Simple Teach two or less topics. Teaching documented in discharge instructions in AVS and copy given to patient. [x]   1   Intermediate Teach three to four topics. Teaching documented in Discharge Instructions in AVS using References and Attachments. Copy given to patient. []   2   Complex Teach five to six topics.  Teaching documented in Discharge Instructions in AVS using References and Attachments. May also be documentation in Education Navigator. Copy given to patient. []   3           Patient Assessment  Planning Definition Points   Simple Complete all tabs in patient assessment navigator. Review or complete patient'sTherapy Plan. [x]   1   Intermediate Complete all tabs in patient assessment navigator. Review or completed patient'sTherapy Plan. Contact doctor based on nursing assessment. []   2   Complex Complete all tabs in patient assessment navigator. Completed patient's Therapy Plan. Contact doctor based on nursing assessment and write order related to needed care. []   3     Patient Planning   Planning Definition Points   Simple Discharged, instructions and After Visit Summary given to patient/caregiver and instructions completed. Simple follow-up with routine assessment and planning. [x]   1   Intermediate Discharged, instructions and After Visit Summary given to patient/caregiver and instructions completed. Contact with outside resources; i.e. Telephone calls to PCP, home health, ECF and/or arranging transportation. []   2   Complex Discharged, instructions and After Visit Summary given to patient/caregiver and instructions completed. Contact with outside resources; i.e. Telephone calls to PCP, home health, ECF and/or arranging transportation. May include filling out forms and/or writing letters, communication with insurance , preauthorization for treatment.    []   3       Is this the Patient's First Visit to the [facility]  No    Is this Patient Established @ this [facility] [place]  Yes              Clinical Level of Care      Points  0-2  Level 1 []     Points  3-5  Level 2 [x]     Points  6-9  Level 3 []     Points  10-12  Level 4 []     Points  13-15  Level 5 []       Electronically signed by Slava Wright RN on 5/12/2021 at 4:05 PM

## 2021-05-12 NOTE — PROGRESS NOTES
[address] \[facility]\""    IV Antibiotic Visit    NAME:  Ravinder Valerio  YOB: 1970  MEDICAL RECORD NUMBER:  [id number]  DATE:  5/12/2021    Patient arrived to [facility] 58   [] per wheelchair   [x] ambulatory    Alert and oriented x 4. States feels like he is improving. Call placed to Dr. Kyla Cabot re discontinuing PICC line after today's dose. MD in clinic and will call when reviewed. Denies any side effects of last infusion. Has a follow up appointment with the wound care center next week and scheduled for a bx of his prostate. Denies new s/s of infection or increase in respiratory distress. Afebrile. Wearing face mask to prevent the spread of COVID-19       Itching: No  Rash: No  Mouth Sores: No  Nausea: No  Vomiting: No  Diarrhea: No  Night Sweats: No  Fever: No    Administered: [] Peripheral access    [x] PICC access    [] Port access    No Known Allergies    Name of Antibiotic Administered: ertapenum  Dose of Antibiotic Administered: 1000 mg. Indication for Antibiotic: wound  of scrotum      Response to treatment:  Well tolerated by patient. Scheduled to return for next antibiotic dose N/A last dose      PICC Line Removal      PICC Location:  right arm    Patient has been treated for:  Scrotal abscess     Patient has completed treatment of: 2  weeks of treatment     Order for removal of PICC line given by: Dr. Kyla Cabot    PICC placed on:   April 26,2021. PICC cut to 47 cm. Patient positioned:   [] Lying   [x] Sitting   [] Other         PICC dressing removed:  Yes     New pair of sterile gloves donned and sterile 4 x 4 gauze placed under PICC line: Yes    Keeping a sterile 4x4 over top of exit site, PICC removed slowly by small increments and line not stretched.       PICC line removed without any complications:  Yes    Pressure held to exit site for 30 - 60 seconds:  Yes    Bleeding at site:  no    Site scrubbed with Chloroprep Scrub for 30 seconds and air dried completely for 1 minute. Yes  Small piece of vaseline gauze placed over exit site and covered with a dry 2 x 2 gauze and covered with a tegaderm dressing. Yes     Patient instructed to keep dressing dry and intact for 48 hours. Yes  No heavy lifting with right arm x 48 hours  If bleeding occurs, apply pressure to site x 5 minutes and elevate arm, if bleeding continues got to the emergency room. Call DrKojo for any swelling, redness, red streaks, drainage at PICC insertion site      Response to treatment:  Well tolerated by patient.  Instructed to  prescription for Augmentin at pharmacy     Electronically signed by George Mendieta RN on 5/12/2021 at 1:35 PM     Electronically signed by George Mendieta RN on 5/12/2021 at 1:11 PM

## 2021-05-12 NOTE — TELEPHONE ENCOUNTER
D/C PICC line orders given to INFUSION center and start Augmentin x 875 mg x q 12 HR X 7 DAYs med sent to his listed pharmacy

## 2021-05-18 ENCOUNTER — HOSPITAL ENCOUNTER (OUTPATIENT)
Dept: WOUND CARE | Age: 51
Discharge: HOME | End: 2021-05-18
Payer: COMMERCIAL

## 2021-05-18 VITALS
SYSTOLIC BLOOD PRESSURE: 164 MMHG | RESPIRATION RATE: 18 BRPM | DIASTOLIC BLOOD PRESSURE: 91 MMHG | TEMPERATURE: 97.9 F | HEART RATE: 88 BPM

## 2021-05-18 DIAGNOSIS — S31.30XS OPEN WOUND OF SCROTUM, SEQUELA: Primary | ICD-10-CM

## 2021-05-18 PROCEDURE — 11042 DBRDMT SUBQ TIS 1ST 20SQCM/<: CPT | Performed by: EMERGENCY MEDICINE

## 2021-05-18 PROCEDURE — 11042 DBRDMT SUBQ TIS 1ST 20SQCM/<: CPT

## 2021-05-18 RX ORDER — LIDOCAINE HYDROCHLORIDE 40 MG/ML
SOLUTION TOPICAL ONCE
Status: DISCONTINUED | OUTPATIENT
Start: 2021-05-18 | End: 2021-05-19 | Stop reason: HOSPADM

## 2021-05-18 ASSESSMENT — PAIN SCALES - GENERAL: PAINLEVEL_OUTOF10: 0

## 2021-05-18 NOTE — PROGRESS NOTES
[address]   Progress Note       [address] RECORD NUMBER:  [id number]  AGE: 53 y.o. GENDER: male  : 1970  EPISODE DATE:  2021    Subjective:     Chief Complaint   Patient presents with    Wound Check     follow visit for scrotum wound        Patient without any new wound care issues. Symptoms or pertinent wound history since last visit: none. Denies any other constitutional symptoms otherwise. Has been tolerating wound care dressings without problems. Assessment:     50-year-old male with a nonhealing surgical wound to scrotum. Severity of fat layers exposed.     Patient is at moderate risk for morbidity and mortality due to conditions affecting wound healing and addressed today: Prediabetes  Education and discussion held with patient regarding these disease processes pertinent to wound(s). Problem List Items Addressed This Visit     Open wound of scrotum - Primary        Wound evaluation: wound with exudated, biofilm. hypergranular along edges. 2 embeded sutures. Plan:     1. Wound care: debridement. 2 sutures removed. Silver nitrate for hypergranular tissue. Please see attached Discharge Instructions for specific wound treatment plan  2. Offloading and edema control: Elevation of the scrotum at all times. Avoid pressure and trauma to wound  3. Infection: No signs of acute infection. Continue to monitor  4. Circulation: Nonissue for location of wound although pressure relief is needed to help with local skin perfusion  5. Nutrition: Discussed in detail with patient. Encourage protein emphasis with meals. Encourage low glycemic index food intake. Procedure Note  Indications:  Based on my examination of this patient's wound(s)/ulcer(s) today, debridement is required to promote healing and evaluate the wound base.   Performed by: Gay Conklin MD  Consent obtained:  Yes  Time out taken:  Yes  Pain Control: Anesthetic  Anesthetic: 4% Lidocaine Cream     Debridement: Excisional Debridement  Using curette the wound(s)/ulcer(s) was/were debrided down through and including the removal of subcutaneous tissue. Devitalized Tissue Debrided:  fibrin, biofilm, slough and exudate    Pre Debridement Measurements:  Are located in the [facility]  Documentation Flow Sheet    Wound/Ulcer #: 1    Post Debridement Measurements:  Wound/Ulcer Descriptions are Pre Debridement except measurements:    Wound 05/04/21 Scrotum #1 (Active)   Wound Image   05/04/21 0857   Dressing/Treatment Alginate with Ag;Gauze dressing/dressing sponge 05/04/21 0957   Wound Length (cm) 1.6 cm 05/18/21 1034   Wound Width (cm) 0.3 cm 05/18/21 1034   Wound Depth (cm) 0.1 cm 05/18/21 1034   Wound Surface Area (cm^2) 0.48 cm^2 05/18/21 1034   Change in Wound Size % (l*w) 60 05/18/21 1034   Wound Volume (cm^3) 0.05 cm^3 05/18/21 1034   Wound Healing % 92 05/18/21 1034   Post-Procedure Length (cm) 1.6 cm 05/18/21 1115   Post-Procedure Width (cm) 0.3 cm 05/18/21 1115   Post-Procedure Depth (cm) 0.2 cm 05/18/21 1115   Post-Procedure Surface Area (cm^2) 0.48 cm^2 05/18/21 1115   Post-Procedure Volume (cm^3) 0.1 cm^3 05/18/21 1115   Wound Assessment Hyper granulation tissue 05/18/21 1034   Drainage Amount Small 05/18/21 1034   Drainage Description Serous 05/18/21 1034   Odor None 05/18/21 1034   Tosin-wound Assessment Intact 05/18/21 1034   Margins Attached edges 05/18/21 1034   Wound Thickness Description not for Pressure Injury Full thickness 05/18/21 1034   Number of days: 14     Total Surface Area Debrided:  0.48 sq cm   Diabetic/Pressure/Non Pressure Ulcers only:  Ulcer: Non-Pressure ulcer, fat layer exposed   Estimated Blood Loss:  None  Hemostasis Achieved:  not needed  Procedural Pain:  0  / 10   Post Procedural Pain:  0 / 10   Response to treatment:  Well tolerated by patient.      Chemical Cautery    Performed by: Dotty Briseno MD    Consent obtained: Yes    Time out taken: Yes    Tissue Type: Hypergranulation    Pain Control: Anesthetic: 4% Lidocaine Cream     Method: silver nitrate    Location of Chemical Cautery:   Wound/Ulcer #1     Wound 05/04/21 Scrotum #1 (Active)   Wound Image   05/04/21 0857   Dressing/Treatment Alginate with Ag;Gauze dressing/dressing sponge 05/04/21 0957   Wound Length (cm) 1.6 cm 05/18/21 1034   Wound Width (cm) 0.3 cm 05/18/21 1034   Wound Depth (cm) 0.1 cm 05/18/21 1034   Wound Surface Area (cm^2) 0.48 cm^2 05/18/21 1034   Change in Wound Size % (l*w) 60 05/18/21 1034   Wound Volume (cm^3) 0.05 cm^3 05/18/21 1034   Wound Healing % 92 05/18/21 1034   Post-Procedure Length (cm) 1.6 cm 05/18/21 1115   Post-Procedure Width (cm) 0.3 cm 05/18/21 1115   Post-Procedure Depth (cm) 0.2 cm 05/18/21 1115   Post-Procedure Surface Area (cm^2) 0.48 cm^2 05/18/21 1115   Post-Procedure Volume (cm^3) 0.1 cm^3 05/18/21 1115   Wound Assessment Hyper granulation tissue 05/18/21 1034   Drainage Amount Small 05/18/21 1034   Drainage Description Serous 05/18/21 1034   Odor None 05/18/21 1034   Tosin-wound Assessment Intact 05/18/21 1034   Margins Attached edges 05/18/21 1034   Wound Thickness Description not for Pressure Injury Full thickness 05/18/21 1034   Number of days: 14     Procedural Pain: 0  / 10     Post Procedural Pain: 0 / 10     Response to treatment: Well tolerated by patient. HISTORY of PRESENT ILLNESS HPI     Jose Khan is a 53 y.o. male who presents today for wound/ulcer evaluation. History of Wound Context: Per original history and physical on this patient.  Changes in history since last exam: none    Objective:    BP (!) 164/91   Pulse 88   Temp 97.9 °F (36.6 °C) (Oral)   Resp 18   Wt Readings from Last 3 Encounters:   05/05/21 241 lb (109.3 kg)   05/04/21 241 lb 10 oz (109.6 kg)   04/29/21 237 lb (107.5 kg)       PHYSICAL EXAM  General: alert and oriented to person, place and time, well-developed and well nourished, and in no acute distress  Skin: warm and dry, no rash  Head: normocephalic and atraumatic  Eyes: extraocular eye movements intact, conjunctivae normal, and sclera anicteric  ENT: hearing grossly normal bilaterally. Normal appearance  Respiratory: no chest wall tenderness. no respiratory distress  GI: abdomen soft, non-tender and non-distended, benign  Musculoskeletal: normal range of motion of joints. Nontender calves. No cyanosis. Edema NA  Neurologic: Speech normal. No focal deficits.  Mental status normal.     PAST MEDICAL HISTORY        Diagnosis Date    Anxiety     was on paxil in the past    Elevated LFTs     History of tobacco abuse     Hypertension     Prediabetes        PAST SURGICAL HISTORY    Past Surgical History:   Procedure Laterality Date    CYSTOSCOPY N/A 3/29/2021    CYSTOSCOPY,  SUPRAPUBIC TUBE INSERTION performed by Pedro Saul MD at [address] [place] [address] Right 2021    INCISION AND DRAINAGE SCROTAL ABSCESS, CYSTOSCOPY, EXCHANGE OF SUPRA PUBIC CATHETER performed by Pedro Saul MD at [facility]    Family History   Problem Relation Age of Onset    Stroke Father     Asthma Sister     High Blood Pressure Brother     No Known Problems Mother        SOCIAL HISTORY    Social History     Tobacco Use    Smoking status: Former Smoker     Packs/day: 2.00     Years: 10.00     Pack years: 20.00     Quit date: 2007     Years since quittin.0    Smokeless tobacco: Former User   Vaping Use    Vaping Use: Never used   Substance Use Topics    Alcohol use: No     Alcohol/week: 0.0 standard drinks    Drug use: No       ALLERGIES    No Known Allergies    MEDICATIONS    Current Outpatient Medications on File Prior to Encounter   Medication Sig Dispense Refill    amoxicillin-clavulanate (AUGMENTIN) 875-125 MG per tablet Take 1 tablet by mouth 2 times daily for 7 days 14 tablet 0    escitalopram (LEXAPRO) 20 MG tablet Take 1 tablet by mouth daily 30 tablet 1  QUEtiapine (SEROQUEL) 25 MG tablet Take 1 tablet by mouth 2 times daily for 14 days 28 tablet 0     No current facility-administered medications on file prior to encounter. REVIEW OF SYSTEMS  A comprehensive review of systems was negative except for: wound    Written patient dismissal instructions given to patient and signed by patient or POA. Discharge [address] and Hyperbaric Oxygen Therapy   Physician Orders and Discharge Instructions  [place] [address] [facility] [address] [address]   [address], [facility]  Telephone: [phone]     NAME:  Jamie Dudley  YOB: 1970  MEDICAL RECORD NUMBER:  [id number]  DATE:  5/18/2021     Wound Cleansing:   Do not scrub or use excessive force. Cleanse wound prior to applying a clean dressing with:  []? Normal Saline  [x]? Keep Wound Dry in Shower    []? Wound Cleanser   []? Cleanse wound with Mild Soap & Water  []? May Shower at Discharge   []? Other:        Topical Treatments:  Do not apply lotions, creams, or ointments to wound bed unless directed. []? Apply moisturizing lotion to skin surrounding the wound prior to dressing change. []? Apply antifungal ointment to skin surrounding the wound prior to dressing change. []? Apply thin film of moisture barrier ointment to skin immediately around wound. []? Other:                  Dressings:                  Wound Location Scrotum     [x]? Apply Primary Dressing:                                          [x]? Alginate with Silver          []? Alginate                [x]? Cover and Secure with:                   [x]? Gauze        []? Marlane Bench           []? Roll gauze              []? Ace Wrap   []? Cover Roll Tape     []? ABD                                      [x]? Other: Spandage and Underwear to Hold in Place              Avoid contact of tape with skin. [x]? Change dressing:   [x]? Daily           []? UNABLE TO OBTAIN WOUND SUPPLIES, CONTINUE TO USE THE SUPPLIES YOU HAVE AVAILABLE UNTIL YOU ARE ABLE TO REACH US. IT IS MOST IMPORTANT TO KEEP THE WOUND COVERED AT ALL TIMES.      Physician Signature:_______________________     Date: ___________ Time:  ____________                                Dr. America Rader MD                                 Electronically signed by Darryl Pinto MD on 5/18/2021 at 11:20 AM

## 2021-05-25 ENCOUNTER — HOSPITAL ENCOUNTER (OUTPATIENT)
Dept: WOUND CARE | Age: 51
Discharge: HOME | End: 2021-05-25
Payer: COMMERCIAL

## 2021-06-25 LAB — PATHOLOGY/CYTOLOGY REPORT: NORMAL

## 2021-08-03 ENCOUNTER — OFFICE VISIT (OUTPATIENT)
Dept: PRIMARY CARE CLINIC | Age: 51
End: 2021-08-03
Payer: COMMERCIAL

## 2021-08-03 ENCOUNTER — HOSPITAL ENCOUNTER (OUTPATIENT)
Age: 51
Discharge: HOME | End: 2021-08-03
Payer: COMMERCIAL

## 2021-08-03 VITALS
DIASTOLIC BLOOD PRESSURE: 74 MMHG | BODY MASS INDEX: 36.48 KG/M2 | SYSTOLIC BLOOD PRESSURE: 128 MMHG | HEART RATE: 76 BPM | WEIGHT: 269 LBS | OXYGEN SATURATION: 98 % | TEMPERATURE: 97.8 F

## 2021-08-03 DIAGNOSIS — Z12.11 SCREEN FOR COLON CANCER: ICD-10-CM

## 2021-08-03 DIAGNOSIS — R73.03 PREDIABETES: Primary | ICD-10-CM

## 2021-08-03 DIAGNOSIS — E78.00 PURE HYPERCHOLESTEROLEMIA: ICD-10-CM

## 2021-08-03 DIAGNOSIS — F41.9 ANXIETY: ICD-10-CM

## 2021-08-03 DIAGNOSIS — E66.01 CLASS 2 SEVERE OBESITY DUE TO EXCESS CALORIES WITH SERIOUS COMORBIDITY AND BODY MASS INDEX (BMI) OF 35.0 TO 35.9 IN ADULT (HCC): ICD-10-CM

## 2021-08-03 PROBLEM — R00.0 TACHYCARDIA: Status: RESOLVED | Noted: 2021-03-29 | Resolved: 2021-08-03

## 2021-08-03 PROBLEM — N30.01 ACUTE CYSTITIS WITH HEMATURIA: Status: RESOLVED | Noted: 2021-03-29 | Resolved: 2021-08-03

## 2021-08-03 PROBLEM — D72.829 LEUKOCYTOSIS: Status: RESOLVED | Noted: 2021-03-29 | Resolved: 2021-08-03

## 2021-08-03 PROBLEM — N13.30 BILATERAL HYDRONEPHROSIS: Status: RESOLVED | Noted: 2021-03-29 | Resolved: 2021-08-03

## 2021-08-03 PROBLEM — N49.2 SCROTAL INFECTION: Status: RESOLVED | Noted: 2021-04-27 | Resolved: 2021-08-03

## 2021-08-03 PROBLEM — N50.89 SCROTAL SWELLING: Status: RESOLVED | Noted: 2021-04-22 | Resolved: 2021-08-03

## 2021-08-03 PROBLEM — M46.46 DISCITIS OF LUMBAR REGION: Status: RESOLVED | Noted: 2017-11-07 | Resolved: 2021-08-03

## 2021-08-03 PROBLEM — R44.3 HALLUCINATIONS: Status: RESOLVED | Noted: 2021-04-22 | Resolved: 2021-08-03

## 2021-08-03 PROBLEM — R63.4 WEIGHT LOSS, NON-INTENTIONAL: Status: RESOLVED | Noted: 2017-11-07 | Resolved: 2021-08-03

## 2021-08-03 PROBLEM — S31.30XA OPEN WOUND OF SCROTUM: Status: RESOLVED | Noted: 2021-05-04 | Resolved: 2021-08-03

## 2021-08-03 PROBLEM — R33.9 URINARY RETENTION: Status: RESOLVED | Noted: 2021-03-29 | Resolved: 2021-08-03

## 2021-08-03 PROBLEM — M46.26 OSTEOMYELITIS OF LUMBAR SPINE (HCC): Status: RESOLVED | Noted: 2017-11-07 | Resolved: 2021-08-03

## 2021-08-03 PROBLEM — M54.50 ACUTE MIDLINE LOW BACK PAIN: Status: RESOLVED | Noted: 2021-03-29 | Resolved: 2021-08-03

## 2021-08-03 PROBLEM — R82.71 BACTERIURIA: Status: RESOLVED | Noted: 2021-04-22 | Resolved: 2021-08-03

## 2021-08-03 PROBLEM — A41.9 SEPSIS (HCC): Status: RESOLVED | Noted: 2021-03-29 | Resolved: 2021-08-03

## 2021-08-03 LAB
A/G RATIO: 1.1 (ref 1.1–2.2)
ALBUMIN SERPL-MCNC: 4.3 G/DL (ref 3.4–5)
ALP BLD-CCNC: 92 U/L (ref 40–129)
ALT SERPL-CCNC: 15 U/L (ref 10–40)
ANION GAP SERPL CALCULATED.3IONS-SCNC: 12 MMOL/L (ref 3–16)
AST SERPL-CCNC: 19 U/L (ref 15–37)
BILIRUB SERPL-MCNC: 0.3 MG/DL (ref 0–1)
BUN BLDV-MCNC: 18 MG/DL (ref 7–20)
CALCIUM SERPL-MCNC: 9.9 MG/DL (ref 8.3–10.6)
CHLORIDE BLD-SCNC: 105 MMOL/L (ref 99–110)
CHOLESTEROL, TOTAL: 195 MG/DL (ref 0–199)
CO2: 25 MMOL/L (ref 21–32)
CREAT SERPL-MCNC: 0.8 MG/DL (ref 0.9–1.3)
GFR AFRICAN AMERICAN: >60
GFR NON-AFRICAN AMERICAN: >60
GLOBULIN: 3.9 G/DL
GLUCOSE BLD-MCNC: 71 MG/DL (ref 70–99)
HBA1C MFR BLD: 5.4 %
HDLC SERPL-MCNC: 50 MG/DL (ref 40–60)
LDL CHOLESTEROL CALCULATED: 128 MG/DL
POTASSIUM SERPL-SCNC: 4.4 MMOL/L (ref 3.5–5.1)
SODIUM BLD-SCNC: 142 MMOL/L (ref 136–145)
TOTAL PROTEIN: 8.2 G/DL (ref 6.4–8.2)
TRIGL SERPL-MCNC: 83 MG/DL (ref 0–150)
VLDLC SERPL CALC-MCNC: 17 MG/DL

## 2021-08-03 PROCEDURE — G8427 DOCREV CUR MEDS BY ELIG CLIN: HCPCS | Performed by: FAMILY MEDICINE

## 2021-08-03 PROCEDURE — G8417 CALC BMI ABV UP PARAM F/U: HCPCS | Performed by: FAMILY MEDICINE

## 2021-08-03 PROCEDURE — 99214 OFFICE O/P EST MOD 30 MIN: CPT | Performed by: FAMILY MEDICINE

## 2021-08-03 PROCEDURE — 1036F TOBACCO NON-USER: CPT | Performed by: FAMILY MEDICINE

## 2021-08-03 PROCEDURE — 36415 COLL VENOUS BLD VENIPUNCTURE: CPT

## 2021-08-03 PROCEDURE — 80053 COMPREHEN METABOLIC PANEL: CPT

## 2021-08-03 PROCEDURE — 83036 HEMOGLOBIN GLYCOSYLATED A1C: CPT | Performed by: FAMILY MEDICINE

## 2021-08-03 PROCEDURE — 80061 LIPID PANEL: CPT

## 2021-08-03 RX ORDER — ESCITALOPRAM OXALATE 20 MG/1
20 TABLET ORAL DAILY
Qty: 90 TABLET | Refills: 1 | Status: SHIPPED | OUTPATIENT
Start: 2021-08-03 | End: 2022-02-08 | Stop reason: SDUPTHER

## 2021-08-03 NOTE — PROGRESS NOTES
PROGRESS NOTE     Carter Lutz MD  [address] [place] [address]. [address] [facility] [address]         Phone: [phone]    Date of Service:  8/3/2021     Patient ID: Kojo Rodarte is a 53 y.o. male      Assessment / Plan:     1. Prediabetes  - POCT glycosylated hemoglobin (Hb A1C)=5.4 normal range. Congratulated patient and encouraged him to try to follow a diabetic diet. Encouraged him to try to get more cardiovascular exercise and discussed its benefits. 2. Class 2 severe obesity due to excess calories with serious comorbidity and body mass index (BMI) of 36 in [facility])  Discussed patient's weight with him. Patient to work on healthy lifestyle changes in order to try to lose weight. 3. Pure hypercholesterolemia  Patient is not fasting today but unlikely to come back fasting. We will therefore check the following blood work:  - Comprehensive Metabolic Panel; Future  - Lipid Panel; Future    4. Anxiety  Mostly well controlled with Lexapro 20 mg. Patient to continue. HM:    Sending to get colonoscopy. Referral  placed a number given to schedule. COVID : Discussed importance of getting this. I do not believe patient is very interested, but will keep trying to make sure he is educated on the importance. Subjective:     CC: prediabetes, HLD, obesity,  anxiety    HPI      30-year-old white male here to discuss the above chronic medical conditions. Patient is not getting much exercise, but admits to trying to eat less carbs than usual.  As far as his anxiety, he takes his Lexapro 20 mg daily. He states it does make him need a little more sleep than prior, but does not feel that he can decrease the dosage as his anxiety becomes uncontrolled when he does. Patient denies any other current symptoms. See negative review of systems below.     BMI = 36.48    Lab Results Component Value Date    LABA1C 5.8 12/09/2020    LABA1C 5.8 06/10/2019    LABA1C 6.0 12/11/2018     Lab Results   Component Value Date    LABMICR YES 04/21/2021    CREATININE 0.6 (L) 05/11/2021     Lab Results   Component Value Date    ALT 19 04/21/2021    AST 18 04/21/2021     Lab Results   Component Value Date    CHOL 179 12/09/2020    TRIG 84 12/09/2020    HDL 46 12/09/2020    LDLCALC 116 (H) 12/09/2020          ROS:    Constitutional:  Negative for activity or appetite change, fever or fatigue  Resp:  Negative for SOB, chest tightness, cough  Cardiovascular: Negative for CP, palpitations, SANTOS, orthopnea, PND, LE edema  Gastrointestinal: Negative for abd pain, melena, BRBPR, N/V/D  Endocrine:  Negative for polydipsia and polyuria  :  Negative for dysuria, flank pain or urinary frequency  Musculoskeletal:  Negative for myalgias  Neuro:  Negative for dizziness or lightheadedness  Psych: negative for depression or anxiety      Vitals:    08/03/21 0812   BP: 128/74   Pulse: 76   Temp: 97.8 °F (36.6 °C)   TempSrc: Temporal   SpO2: 98%   Weight: 269 lb (122 kg)       Outpatient Medications Marked as Taking for the 8/3/21 encounter (Office Visit) with Isa Fagan MD   Medication Sig Dispense Refill    escitalopram (LEXAPRO) 20 MG tablet Take 1 tablet by mouth daily 30 tablet 1             Objective:   Constitutional:   · Reviewed vitals above  · Well Nourished, well developed, no distress       Neck:  · Symmetric and without masses  · No thyromegaly  Resp:  · Normal effort  · Clear to auscultation bilaterally without rhonchi, wheezing or crackles  Cardiovascular:  · On auscultation, normal S1 and S2 without murmurs, rubs or gallops  · No bruits of bilateral carotids and no JVD  Gastrointestinal:  · Nontender, nondistended, and no masses  · No hepatosplenomegaly  Musculoskeletal:  · Normal Gait  · All extremities without clubbing, cyanosis or edema  Skin:  · No rashes on inspection  · No areas of increased heat or induration on palpation  Psych:  · Normal mood and affect  · Normal insight and judgement        EMR Dragon/transcription disclaimer:  Much of this encounter note is electronic transcription/translation of spoken language to printed texts. The electronic translation of spoken language may be erroneous, or at times, nonsensical words or phrases may be inadvertently transcribed.   Although I have reviewed the note for such errors, some may still exist.

## 2021-10-11 ENCOUNTER — HOSPITAL ENCOUNTER (EMERGENCY)
Age: 51
Discharge: HOME | End: 2021-10-11
Attending: EMERGENCY MEDICINE
Payer: COMMERCIAL

## 2021-10-11 VITALS
SYSTOLIC BLOOD PRESSURE: 134 MMHG | RESPIRATION RATE: 18 BRPM | HEART RATE: 73 BPM | DIASTOLIC BLOOD PRESSURE: 89 MMHG | OXYGEN SATURATION: 97 % | TEMPERATURE: 98 F | WEIGHT: 277.12 LBS | BODY MASS INDEX: 37.53 KG/M2 | HEIGHT: 72 IN

## 2021-10-11 DIAGNOSIS — T63.481A INSECT STINGS, ACCIDENTAL OR UNINTENTIONAL, INITIAL ENCOUNTER: Primary | ICD-10-CM

## 2021-10-11 PROCEDURE — 6370000000 HC RX 637 (ALT 250 FOR IP): Performed by: EMERGENCY MEDICINE

## 2021-10-11 PROCEDURE — 99283 EMERGENCY DEPT VISIT LOW MDM: CPT

## 2021-10-11 RX ORDER — PREDNISONE 20 MG/1
60 TABLET ORAL ONCE
Status: COMPLETED | OUTPATIENT
Start: 2021-10-11 | End: 2021-10-11

## 2021-10-11 RX ORDER — PREDNISONE 20 MG/1
40 TABLET ORAL DAILY
Qty: 14 TABLET | Refills: 0 | Status: SHIPPED | OUTPATIENT
Start: 2021-10-11 | End: 2021-10-18

## 2021-10-11 RX ADMIN — PREDNISONE 60 MG: 20 TABLET ORAL at 18:14

## 2021-10-11 ASSESSMENT — PAIN DESCRIPTION - LOCATION: LOCATION: ELBOW

## 2021-10-11 ASSESSMENT — PAIN DESCRIPTION - ORIENTATION: ORIENTATION: RIGHT

## 2021-10-11 ASSESSMENT — PAIN SCALES - GENERAL: PAINLEVEL_OUTOF10: 2

## 2021-10-11 ASSESSMENT — PAIN DESCRIPTION - PAIN TYPE: TYPE: ACUTE PAIN

## 2021-10-11 NOTE — ED TRIAGE NOTES
Patient came to ER with complaints of bee sting right elbow. Patient stated swelled and turned red immediately. Pain radiating to right shoulder. Made patient worry and brought self to the ER.

## 2021-10-11 NOTE — ED PROVIDER NOTES
CHIEF COMPLAINT  Chief Complaint   Patient presents with    Insect Bite     bee sting ti right arm 45 minutes ago, c/o shooting sharp  pain up to right arm and shoulder       HISTORY OF PRESENT ILLNESS  Maira Maurice is a 53 y.o. male who presents to the ED complaining of a bee sting to the right volar forearm 1 hour prior to arrival with complaints of local swelling and redness. Patient denies hives. No shortness of breath or chest pain. No nausea, vomiting, abdominal pain. No swelling of the lips, tongue, throat. No dysphonia. No dysphagia. No history of anaphylactic reaction or angioedema. No other complaints, modifying factors or associated symptoms. Nursing notes reviewed.    Past Medical History:   Diagnosis Date    Anxiety     was on paxil in the past    Elevated LFTs     History of tobacco abuse     Hypertension     Prediabetes     Pure hypercholesterolemia      Past Surgical History:   Procedure Laterality Date    CYSTOSCOPY N/A 3/29/2021    CYSTOSCOPY,  SUPRAPUBIC TUBE INSERTION performed by Rylee Luz MD at [address] [place] [address] Right 2021    INCISION AND DRAINAGE SCROTAL ABSCESS, CYSTOSCOPY, EXCHANGE OF SUPRA PUBIC CATHETER performed [facility] Luz MD [facility] History   Problem Relation Age of Onset    Stroke Father     Asthma Sister     High Blood Pressure Brother     No Known Problems Mother      Social History     Socioeconomic History    Marital status:      Spouse name: Not on file    Number of children: Not on file    Years of education: Not on file    Highest education level: Not on file   Occupational History    Not on file   Tobacco Use    Smoking status: Former Smoker     Packs/day: 2.00     Years: 10.00     Pack years: 20.00     Quit date: 2007     Years since quittin.4    Smokeless tobacco: Former User   Vaping Use    Vaping Use: Never used   Substance and Sexual Activity    Alcohol use: No     Alcohol/week: 0.0 standard drinks    Drug use: No    Sexual activity: Never   Other Topics Concern    Not on file   Social History Narrative    Not on file     Social Determinants of Health     Financial Resource Strain:     Difficulty of Paying Living Expenses:    Food Insecurity:     Worried About Running Out of Food in the Last Year:     [address] in the Last Year:    Transportation Needs:     Lack of Transportation (Medical):  Lack of Transportation (Non-Medical):    Physical Activity:     Days of Exercise per Week:     Minutes of Exercise per Session:    Stress:     Feeling of Stress :    Social Connections:     Frequency of Communication with Friends and Family:     Frequency of Social Gatherings with Friends and Family:     Attends Taoist Services:     Active Member of Clubs or Organizations:     Attends Club or Organization Meetings:     Marital Status:    Intimate Partner Violence:     Fear of Current or Ex-Partner:     Emotionally Abused:     Physically Abused:     Sexually Abused:      Current Facility-Administered Medications   Medication Dose Route Frequency Provider Last Rate Last Admin    predniSONE (DELTASONE) tablet 60 mg  60 mg Oral Once Aditi Lynn MD         Current Outpatient Medications   Medication Sig Dispense Refill    predniSONE (DELTASONE) 20 MG tablet Take 2 tablets by mouth daily for 7 days 14 tablet 0    escitalopram (LEXAPRO) 20 MG tablet Take 1 tablet by mouth daily 90 tablet 1    QUEtiapine (SEROQUEL) 25 MG tablet Take 1 tablet by mouth 2 times daily for 14 days 28 tablet 0     No Known Allergies    REVIEW OF SYSTEMS  Positives and pertinent negatives as per HPI. Six other systems were reviewed and are negative. Nursing notes pertaining to ROS were reviewed.            PHYSICAL EXAM   /89   Pulse 73   Temp 98 °F (36.7 °C) (Oral)   Resp 18   Ht 6' (1.829 m)   Wt 277 lb 1.9 oz (125.7 kg)   SpO2 97%   BMI 37.58 kg/m² GENERAL APPEARANCE: Awake and alert. Cooperative. No acute distress. HEAD: Normocephalic. Atraumatic. EYES: PERRL. EOM's grossly intact. No scleral icterus, injection or exudate  ENT: Mucous membranes are moist.  No angioedema  NECK: Supple. Normal ROM. CHEST:  Regular rate and rhythm, no murmurs, rubs or gallops  LUNGS: Breathing is unlabored. Speaking comfortably in full sentences. Clear through auscultation bilaterally  ABDOMEN: Nondistended, nontender  EXTREMITIES: MAEE. No acute deformities. SKIN: Warm and dry. No hives. Patient has a 3 cm area of erythema and warmth over the right volar forearm that is not tender to palpation  NEUROLOGICAL: Alert and oriented. ED COURSE/MDM  Bee sting with local reaction without evidence of hives, angioedema or systemic symptoms. Patient was observed in the emergency room for 1 hour without any progression of symptoms. Patient will be given prednisone 40 mg p.o. daily x7 days for home, advised to use cool compresses and Benadryl 25 mg every 6 hours as needed. Return to the emergency room for worsening symptoms. Patient was given scripts for the following medications. I counseled patient how to take these medications. New Prescriptions    PREDNISONE (DELTASONE) 20 MG TABLET    Take 2 tablets by mouth daily for 7 days         CLINICAL IMPRESSION  1. Insect stings, accidental or unintentional, initial encounter        Blood pressure 134/89, pulse 73, temperature 98 °F (36.7 °C), temperature source Oral, resp. rate 18, height 6' (1.829 m), weight 277 lb 1.9 oz (125.7 kg), SpO2 97 %.       Follow-up with:  Chiquita Rojas MD  [address] [place] [address] [address] [phone]    In 1 week  If symptoms worsen          Nadine Rg MD  10/11/21 2627

## 2021-10-11 NOTE — ED NOTES
Discharge instructions reviewed. Patient verbalized understanding. Prescription sent to pharmacy. Patient given written instructions.        Gerson Durham RN  10/11/21 6438

## 2021-11-30 ENCOUNTER — TELEPHONE (OUTPATIENT)
Dept: PRIMARY CARE CLINIC | Age: 51
End: 2021-11-30

## 2021-11-30 NOTE — TELEPHONE ENCOUNTER
----- Message from Rudy Aguilera sent at 11/29/2021 12:59 PM EST -----  Subject: Referral Request    QUESTIONS   Reason for referral request? Pt is wanting a referral for a dermatologist.   Please advise. Has the physician seen you for this condition before? No   Preferred Specialist (if applicable)? Do you already have an appointment scheduled? No  Additional Information for Provider? Pt states he has a dry skin patch on   left hand and would like referral to dermatologist.   ---------------------------------------------------------------------------  --------------  [address]  What is the best way for the office to contact you? OK to leave message on   voicemail  Preferred Call Back Phone Number?  [phone]

## 2021-12-16 ENCOUNTER — VIRTUAL VISIT (OUTPATIENT)
Dept: PRIMARY CARE CLINIC | Age: 51
End: 2021-12-16
Payer: COMMERCIAL

## 2021-12-16 DIAGNOSIS — J98.9 RESPIRATORY ILLNESS: Primary | ICD-10-CM

## 2021-12-16 DIAGNOSIS — J98.9 RESPIRATORY ILLNESS: ICD-10-CM

## 2021-12-16 PROCEDURE — G8427 DOCREV CUR MEDS BY ELIG CLIN: HCPCS | Performed by: FAMILY MEDICINE

## 2021-12-16 PROCEDURE — 99213 OFFICE O/P EST LOW 20 MIN: CPT | Performed by: FAMILY MEDICINE

## 2021-12-16 PROCEDURE — 1036F TOBACCO NON-USER: CPT | Performed by: FAMILY MEDICINE

## 2021-12-16 PROCEDURE — G8484 FLU IMMUNIZE NO ADMIN: HCPCS | Performed by: FAMILY MEDICINE

## 2021-12-16 PROCEDURE — G8417 CALC BMI ABV UP PARAM F/U: HCPCS | Performed by: FAMILY MEDICINE

## 2021-12-16 RX ORDER — DOXYCYCLINE HYCLATE 100 MG
100 TABLET ORAL 2 TIMES DAILY
Qty: 10 TABLET | Refills: 0 | Status: SHIPPED | OUTPATIENT
Start: 2021-12-16 | End: 2021-12-21

## 2021-12-16 NOTE — PROGRESS NOTES
PROGRESS NOTE     Alba Sabillon MD  [address] [facility]                                  [address] [facility], [address], [address] [facility] [address]         Phone: [phone]      Name:  Max Wall  :  1970  Date:  2021    ASSESSMENT/PLAN:    1. Respiratory illness  As patient feels that symptoms are moving into his chest, and I am not able to listen to his chest via virtual visit, will treat empirically with doxycycline in case there is a bacterial component. Did discuss that Covid is still a possibility, placed order, and gave him information of drive-through Covid testing at [facility] [place].  Patient is going to go this afternoon for testing. Reviewed red flags, and when to seek emergent care if needed in the future. SUBJECTIVE/OBJECTIVE:    CC:  Acute respiratory illness      HPI:     54 yo WM with 5 days of respiratory symtpoms. Sx's started as a sore throat which has since resolved. He now has post nasal drip and cough productive of mucus. He can \"feel some mucus in chest\". No CP, SOB, loss of taste or smell,  headache, neck, sinus pressure, abd pain, N/V/D    2 yo son has similar sx's.       ROS:  See hpi      Patient-Reported Vitals 2021   Patient-Reported Temperature 97.2        Outpatient Medications Marked as Taking for the 21 encounter (Virtual Visit) with Linda Chery MD   Medication Sig Dispense Refill    doxycycline hyclate (VIBRA-TABS) 100 MG tablet Take 1 tablet by mouth 2 times daily for 5 days 10 tablet 0    escitalopram (LEXAPRO) 20 MG tablet Take 1 tablet by mouth daily 90 tablet 1       Physical Exam:    Constitutional:   · Reviewed vitals above  · Well Nourished, well developed, no distress       HENT:  · Sounds nasally congested  Resp:  · Normal effort  · No visualized signs of difficulty breathing or respiratory distress  Musculoskeletal:  · Normal ROM of neck w/o pain  Skin:  · No rashes on inspection of facial skin  Psych:  · Normal mood and affect  · Normal insight and judgement                  Maximilian Cunningham  is a 53 y.o.  male being evaluated by a Virtual Visit (video visit) encounter to address concerns as mentioned above. A caregiver was present when appropriate. Due to this being a TeleHealth encounter (During [employer]-17 public health emergency), evaluation of the following organ systems was limited: Vitals/Constitutional/EENT/Resp/CV/GI//MS/Neuro/Skin/Heme-Lymph-Imm. Pursuant to the emergency declaration under the [address] [place] [address], [address] [place] [address] authority and the [facility] and [employer] Act, this Virtual Visit was conducted with patient's (and/or legal guardian's) consent, to reduce the patient's risk of exposure to COVID-19 and provide necessary medical care. The patient (and/or legal guardian) has also been advised to contact this office for worsening conditions or problems, and seek emergency medical treatment and/or call 911 if deemed necessary. Patient identification was verified at the start of the visit: Yes    Services were provided through a video synchronous discussion virtually to substitute for in-person clinic visit. Patient was located at home and provider was located in office or at home. EMR Dragon/transcription disclaimer:  Much of this encounter note is electronic transcription/translation of spoken language to printed texts. The electronic translation of spoken language may be erroneous, or at times, nonsensical words or phrases may be inadvertently transcribed. Although I have reviewed the note for such errors, some may still exist.       An electronic signature was used to authenticate this note.     --Mango Decker MD

## 2021-12-16 NOTE — PROGRESS NOTES
[address] and [facility]                                             [address] [facility], [address] [place] [address], [address] [place] [address]        Phone: [phone]      Name:  Duarte Medrano  :    1970    Consultants:   Patient Care Team:  Ariana Holcomb MD as PCP - General (Family Medicine)  Ariana Holcomb MD as PCP - [facility] Empaneled Provider    Chief Complaint:     Duarte Medrano is a 53 y.o. male  who presents today for an established patient care visit with Personalized Prevention Plan Services as noted below. HPI:     Patient 53year old male with a past medical history prediabetes, HLD, obesity and anxiety who presents today for ***    O  P  Q  R  S  T  A    Lipid panel completed on 8/3/21 showed a TC of 195 and LDL of 128        Patient Active Problem List   Diagnosis    Anxiety    History of tobacco abuse    Prediabetes    Obesity due to excess calories    Morbid obesity due to excess calories ([address]    Pure hypercholesterolemia         Past Medical History:    Past Medical History:   Diagnosis Date    Anxiety     was on paxil in the past    Elevated LFTs     History of tobacco abuse     Hypertension     Prediabetes     Pure hypercholesterolemia        Past Surgical History:  Past Surgical History:   Procedure Laterality Date    CYSTOSCOPY N/A 3/29/2021    CYSTOSCOPY,  SUPRAPUBIC TUBE INSERTION performed by Angeline Raza MD at [address] [facility] Right 2021    INCISION AND DRAINAGE SCROTAL ABSCESS, CYSTOSCOPY, EXCHANGE OF SUPRA PUBIC CATHETER performed by Angeline Raza MD at [address] [facility]:  Prior to Visit Medications    Medication Sig Taking?  Authorizing Provider   escitalopram (LEXAPRO) 20 MG tablet Take 1 tablet by mouth daily  Ariana Holcomb MD   QUEtiapine (SEROQUEL) 25 MG tablet Take 1 tablet by mouth 2 times daily for 14 days Shankar Flood, APRN - CNP       Allergies:    Patient has no known allergies. Family History:       Problem Relation Age of Onset    Stroke Father     Asthma Sister     High Blood Pressure Brother     No Known Problems Mother          Health Maintenance Completed:  Health Maintenance   Topic Date Due    Hepatitis C screen  Never done    COVID-19 Vaccine (1) Never done    Colon cancer screen colonoscopy  Never done    Flu vaccine (1) 09/01/2021    A1C test (Diabetic or Prediabetic)  08/03/2022    Lipid screen  08/03/2026    DTaP/Tdap/Td vaccine (2 - Td or Tdap) 08/10/2026    HIV screen  Completed    Hepatitis A vaccine  Aged Out    Hepatitis B vaccine  Aged Out    Hib vaccine  Aged Out    Meningococcal (ACWY) vaccine  Aged Out    Pneumococcal 0-64 years Vaccine  Aged [employer] History   Administered Date(s) Administered    Influenza, Quadv, IM, PF (6 mo and older Fluzone, Flulaval, Fluarix, and 3 yrs and older Afluria) 11/06/2018, 12/09/2020    PPD Test 11/13/2017    Tdap (Boostrix, Adacel) 08/10/2016         Review of Systems:  Review of Systems     Physical Exam:   There were no vitals filed for this visit. There is no height or weight on file to calculate BMI. Wt Readings from Last 3 Encounters:   10/11/21 277 lb 1.9 oz (125.7 kg)   08/03/21 269 lb (122 kg)   05/05/21 241 lb (109.3 kg)       BP Readings from Last 3 Encounters:   10/11/21 134/89   08/03/21 128/74   05/18/21 (!) 164/91       Physical Exam         Lab Review: {Recent KA:67002::\"YKO applicable\"}       Assessment/Plan:  There are no diagnoses linked to this encounter.     Health Maintenance Due:  Health Maintenance Due   Topic Date Due    Hepatitis C screen  Never done    COVID-19 Vaccine (1) Never done    Colon cancer screen colonoscopy  Never done    Flu vaccine (1) 09/01/2021          Health care decision maker:  {health care decision maker allegra:47406}      Health Maintenance: (USPSTF Recommendations)  (F) Breast Cancer Screen: (40-49 (C), 50-74 biennial screening mammogram (B))  (F) Cervical Cancer Screen: (21-29 q3yr cytology alone; 30-65 q3yr cytology alone, q5yr with hrHPV alone, or q5yr cytology+hrHPV (A))  (M) Prostate Cancer Screen: (54-79 yo discuss benefits/harm, does not recommend testing PSA in men >75 yo (D):   (M) AAA Screen: (men 73-69 yo who has ever smoked (B), consider in nonsmokers if high risk):  CRC/Colonoscopy Screening: (adults 39-53 (B), 50-75 (A))  Lung Ca Screening: Annual LDCT (+smoker age 49-80, smoked within 15 years, total of 20 pack yr history (B)):  DEXA Screen: (women >65 and older, <65 if at risk/postmenopausal (B))  HIV Screen: (16-65 yr old, and all pregnant patients (A)): Hep C Screen: (18-79 yr old (B)):  HCC Screen: (all pts with cirrhosis and high risk Hep B (US q6 mo)):  Immunizations:    RTC:  No follow-ups on file. EMR Dragon/transcription disclaimer:  Much of this encounter note is electronic transcription/translation of spoken language to printed texts. The electronic translation of spoken language may be erroneous, or at times, nonsensical words or phrases may be inadvertently transcribed.   Although I have reviewed the note for such errors, some may still exist.

## 2021-12-16 NOTE — PATIENT INSTRUCTIONS
[address] [facility]  (Outreach Car Covid Collection)  ADDRESS:  [place] [address] phone [phone]; Fax [phone] --  M-F [phone]. Around Back by the MOB Loading Dock-designated parking spots with a phone number to call for service. ? No appointment needed. -     2. [place] (Currently Open)   [place] ([address]) (Outreach Car Covid Collection) :  o Front of the building - designated parking spots with a phone number to call for service. ADDRESS:  [address] [place] [address],[address] Floor [address] phone [phone]; Fax [phone]  --  M-F [phone]. ? No appointment needed.

## 2021-12-17 LAB — SARS-COV-2: NOT DETECTED

## 2021-12-21 ENCOUNTER — HOSPITAL ENCOUNTER (EMERGENCY)
Age: 51
Discharge: HOME | End: 2021-12-21
Attending: EMERGENCY MEDICINE
Payer: COMMERCIAL

## 2021-12-21 ENCOUNTER — TELEPHONE (OUTPATIENT)
Dept: PRIMARY CARE CLINIC | Age: 51
End: 2021-12-21

## 2021-12-21 ENCOUNTER — APPOINTMENT (OUTPATIENT)
Dept: GENERAL RADIOLOGY | Age: 51
End: 2021-12-21
Payer: COMMERCIAL

## 2021-12-21 VITALS
HEART RATE: 81 BPM | OXYGEN SATURATION: 94 % | TEMPERATURE: 97.6 F | WEIGHT: 279 LBS | SYSTOLIC BLOOD PRESSURE: 176 MMHG | BODY MASS INDEX: 37.79 KG/M2 | DIASTOLIC BLOOD PRESSURE: 104 MMHG | RESPIRATION RATE: 20 BRPM | HEIGHT: 72 IN

## 2021-12-21 DIAGNOSIS — R05.9 COUGH: Primary | ICD-10-CM

## 2021-12-21 DIAGNOSIS — J20.8 VIRAL BRONCHITIS: Primary | ICD-10-CM

## 2021-12-21 DIAGNOSIS — R03.0 ELEVATED BLOOD PRESSURE READING: ICD-10-CM

## 2021-12-21 LAB
RAPID INFLUENZA  B AGN: NEGATIVE
RAPID INFLUENZA A AGN: NEGATIVE

## 2021-12-21 PROCEDURE — U0005 INFEC AGEN DETEC AMPLI PROBE: HCPCS

## 2021-12-21 PROCEDURE — 6370000000 HC RX 637 (ALT 250 FOR IP): Performed by: EMERGENCY MEDICINE

## 2021-12-21 PROCEDURE — 71046 X-RAY EXAM CHEST 2 VIEWS: CPT

## 2021-12-21 PROCEDURE — U0003 INFECTIOUS AGENT DETECTION BY NUCLEIC ACID (DNA OR RNA); SEVERE ACUTE RESPIRATORY SYNDROME CORONAVIRUS 2 (SARS-COV-2) (CORONAVIRUS DISEASE [COVID-19]), AMPLIFIED PROBE TECHNIQUE, MAKING USE OF HIGH THROUGHPUT TECHNOLOGIES AS DESCRIBED BY CMS-2020-01-R: HCPCS

## 2021-12-21 PROCEDURE — 6360000002 HC RX W HCPCS: Performed by: EMERGENCY MEDICINE

## 2021-12-21 PROCEDURE — 87804 INFLUENZA ASSAY W/OPTIC: CPT

## 2021-12-21 PROCEDURE — 99283 EMERGENCY DEPT VISIT LOW MDM: CPT

## 2021-12-21 RX ORDER — PREDNISONE 20 MG/1
TABLET ORAL
Qty: 18 TABLET | Refills: 0 | Status: SHIPPED | OUTPATIENT
Start: 2021-12-21 | End: 2021-12-31

## 2021-12-21 RX ORDER — DEXTROMETHORPHAN HYDROBROMIDE AND PROMETHAZINE HYDROCHLORIDE 15; 6.25 MG/5ML; MG/5ML
5 SYRUP ORAL 4 TIMES DAILY PRN
Qty: 118 ML | Refills: 0 | Status: SHIPPED | OUTPATIENT
Start: 2021-12-21 | End: 2021-12-27

## 2021-12-21 RX ORDER — ALBUTEROL SULFATE 90 UG/1
2 AEROSOL, METERED RESPIRATORY (INHALATION) 4 TIMES DAILY PRN
Qty: 18 G | Refills: 0 | Status: SHIPPED | OUTPATIENT
Start: 2021-12-21

## 2021-12-21 RX ORDER — PREDNISONE 20 MG/1
60 TABLET ORAL ONCE
Status: COMPLETED | OUTPATIENT
Start: 2021-12-21 | End: 2021-12-21

## 2021-12-21 RX ADMIN — PREDNISONE 60 MG: 20 TABLET ORAL at 13:35

## 2021-12-21 RX ADMIN — ALBUTEROL SULFATE 5 MG: 2.5 SOLUTION RESPIRATORY (INHALATION) at 13:22

## 2021-12-21 NOTE — ED PROVIDER NOTES
[address] [facility]  eMERGENCY dEPARTMENT eNCOUnter      Pt Name: Mj Lacey  MRN: [id number]  [place] 1970  Date of evaluation: 12/21/2021  Provider: Aimee Foster MD    [address] [place] [address]       Chief Complaint   Patient presents with    Shortness of Breath     with exertion week     Head Congestion     week     Cough     week non productive         HISTORY OF PRESENT ILLNESS  (Location/Symptom, Timing/Onset, Context/Setting, Quality, Duration, Modifying Factors, Severity.)   Mj Lacey is a 53 y.o. male who presents to the emergency department pain with head congestion, cough, shortness of breath. He has been sick for a week. He has had congestion in his nose. He has had a cough that is occasionally productive of sputum. He feels short of breath at times. No chest pain. No documented fever. He states he gets a headache from coughing. No body aches. No vomiting or diarrhea. He had a negative Covid test last week at the [address] [place] [address]. Nursing Notes were reviewed and I agree. REVIEW OF SYSTEMS    (2-9 systems for level 4, 10 or more for level 5)     General: No fever or chills. ENT: Positive nasal congestion. No sore throat or earache. Cardiovascular: No chest pain. Pulmonary: Cough, shortness of breath. GI: No abdominal pain, vomiting, or diarrhea. Neuro: Headache with coughing only. No dizziness. Except as noted above the remainder of the review of systems was reviewed and negative.        PAST MEDICAL HISTORY         Diagnosis Date    Anxiety     was on paxil in the past    Elevated LFTs     History of tobacco abuse     Hypertension     Prediabetes     Pure hypercholesterolemia        SURGICAL HISTORY           Procedure Laterality Date    CYSTOSCOPY N/A 3/29/2021    CYSTOSCOPY,  SUPRAPUBIC TUBE INSERTION performed by Mounika Helton MD at [address] [facility] Right 4/22/2021    INCISION AND DRAINAGE SCROTAL pulses. Skin: Warm and dry, good turgor. No pallor or cyanosis. No diaphoresis. DIAGNOSTIC RESULTS     RADIOLOGY:   Non-plain film images such as CT, Ultrasound and MRI are read by the radiologist. Chaitanya Johnson radiographic images are visualized and preliminarily interpreted by Simeon To MD with the below findings:      Interpretation per the Radiologist below, if available at the time of this note:    XR CHEST (2 VW)   Final Result   No acute abnormality             LABS:  Labs Reviewed   RAPID INFLUENZA A/B ANTIGENS    Narrative:     Performed at:  [facility]) [facility]  [address] [facility]   Phone [phone]       All other labs were within normal range or not returned as of this dictation. EMERGENCY DEPARTMENT COURSE and DIFFERENTIAL DIAGNOSIS/MDM:   Vitals:    Vitals:    12/21/21 1307   BP: (!) 176/104   Pulse: 81   Resp: 20   Temp: 97.6 °F (36.4 °C)   TempSrc: Oral   SpO2: 94%   Weight: 279 lb (126.6 kg)   Height: 6' (1.829 m)       This patient presents with a cough and chest congestion as above. He had a negative Covid test recently. He has bronchospasm on exam.  He is afebrile. He is hypertensive. He is not currently on antihypertensive medications. He has bronchospasm on exam.  His chest x-ray is negative for pneumonia or any acute findings. His flu screen is negative. I did send a Covid PCR which is pending. Clinically I think he has a viral bronchitis. He will be discharged on prednisone, albuterol, and follow-up in a week if not improved. Return for worsening of symptoms or new symptoms of concern. Test results, diagnosis, and treatment plan were discussed with the patient. He understands the treatment plan and follow-up as discussed. PROCEDURES:  None    FINAL IMPRESSION      1. Viral bronchitis    2.  Elevated blood pressure reading          DISPOSITION/PLAN   DISPOSITION Decision To Discharge 12/21/2021 01:56:47 PM      PATIENT REFERRED TO:  Mily Koo MD  [address] [place] [address] [address] [phone]    In 1 week  If symptoms worsen      DISCHARGE MEDICATIONS:  New Prescriptions    ALBUTEROL SULFATE HFA (VENTOLIN HFA) 108 (90 BASE) MCG/ACT INHALER    Inhale 2 puffs into the lungs 4 times daily as needed for Wheezing    PREDNISONE (DELTASONE) 20 MG TABLET    3 tabs po qam for 3 days then 2 tabs qam for 3 days the 1 tab qam for 3 days       (Please note that portions of this note were completed with a voice recognition program.  Efforts were made to edit the dictations but occasionally words are mis-transcribed.)    Simeon To MD  Attending Emergency Physician        Trevin Romeo MD  12/21/21 0840

## 2021-12-21 NOTE — TELEPHONE ENCOUNTER
1. Cough  - promethazine-dextromethorphan (PROMETHAZINE-DM) 6.25-15 MG/5ML syrup;  Take 5 mLs by mouth 4 times daily as needed for Cough  Dispense: 118 mL; Refill: 0

## 2021-12-21 NOTE — ED TRIAGE NOTES
Patient came to ER with complaints of cough, chest and head congestion, shortness of breath with exertion for about 1 week. Patient stated has taken 5 days of antibiotics. Patient stated still not feeling better.

## 2021-12-22 ENCOUNTER — CARE COORDINATION (OUTPATIENT)
Dept: CARE COORDINATION | Age: 51
End: 2021-12-22

## 2021-12-22 LAB — SARS-COV-2: NOT DETECTED

## 2021-12-22 NOTE — CARE COORDINATION
ACM attempted to contact  Tobi Kincaid to follow up on his ED visit from 12.21.2021 dx:Viral bronchitis Elevated blood pressure reading    AVS:Prednisone as prescribed until completed. Your first dose was given in  the emergency department. Your next dose is tomorrow. Albuterol every 4-6 hours as needed for wheezing/coughing/shortness  of breath. Follow-up in 1 week if not improved. Your blood pressure is significantly elevated. It needs to be rechecked. You may require further treatment. Follow-up with your primary care  Provider    Prescribed:     Albuterol Sulfate 108 (90 Base) MCG/ACT 2 puffs Inhalation 4 TIMES DAILY PRN  predniSONE 20 MG 3 tabs po qam for 3 days then 2 tabs qam for 3 days the 1 tab qam for 3 days    LM x 3 unable to contact pt.    NO further outreach

## 2022-01-18 ENCOUNTER — TELEPHONE (OUTPATIENT)
Dept: PRIMARY CARE CLINIC | Age: 52
End: 2022-01-18

## 2022-01-18 NOTE — TELEPHONE ENCOUNTER
----- Message from Eleuterio Luis sent at 1/18/2022  1:05 PM EST -----  Subject: Referral Request    QUESTIONS   Reason for referral request? Pt would like a referral to a [facility] dentist.   His daughter has a tooth growing in behind the other   Has the physician seen you for this condition before? No   Preferred Specialist (if applicable)? Do you already have an appointment scheduled? No  Additional Information for Provider?   ---------------------------------------------------------------------------  --------------  CALL BACK INFO  What is the best way for the office to contact you? OK to leave message on   voicemail  Preferred Call Back Phone Number?  [phone]

## 2022-01-18 NOTE — TELEPHONE ENCOUNTER
NO ANSWER    If pt calls back, his [place] pediatrician needs to put the referral in for a peds dentist, not his PCP. ..

## 2022-01-23 NOTE — TELEPHONE ENCOUNTER
We take care of his children    Do they need a referraL?????    I have never put in a referral for a dentist before as you use one's dental insurance.     Call pt and find out why he believes he needs a referral and let me know details

## 2022-02-08 ENCOUNTER — HOSPITAL ENCOUNTER (OUTPATIENT)
Age: 52
Discharge: HOME | End: 2022-02-08
Payer: COMMERCIAL

## 2022-02-08 ENCOUNTER — OFFICE VISIT (OUTPATIENT)
Dept: PRIMARY CARE CLINIC | Age: 52
End: 2022-02-08
Payer: COMMERCIAL

## 2022-02-08 VITALS
HEIGHT: 72 IN | SYSTOLIC BLOOD PRESSURE: 126 MMHG | DIASTOLIC BLOOD PRESSURE: 82 MMHG | RESPIRATION RATE: 18 BRPM | BODY MASS INDEX: 38.33 KG/M2 | WEIGHT: 283 LBS | HEART RATE: 81 BPM | TEMPERATURE: 96.8 F | OXYGEN SATURATION: 96 %

## 2022-02-08 DIAGNOSIS — F41.9 ANXIETY: ICD-10-CM

## 2022-02-08 DIAGNOSIS — E66.01 MORBID OBESITY DUE TO EXCESS CALORIES (HCC): ICD-10-CM

## 2022-02-08 DIAGNOSIS — E78.00 PURE HYPERCHOLESTEROLEMIA: ICD-10-CM

## 2022-02-08 DIAGNOSIS — R73.03 PREDIABETES: Primary | ICD-10-CM

## 2022-02-08 DIAGNOSIS — R73.03 PREDIABETES: ICD-10-CM

## 2022-02-08 DIAGNOSIS — L85.3 DRY SKIN DERMATITIS: ICD-10-CM

## 2022-02-08 PROBLEM — F17.201 TOBACCO DEPENDENCE IN REMISSION: Status: ACTIVE | Noted: 2022-02-08

## 2022-02-08 PROCEDURE — 80048 BASIC METABOLIC PNL TOTAL CA: CPT

## 2022-02-08 PROCEDURE — G8484 FLU IMMUNIZE NO ADMIN: HCPCS | Performed by: FAMILY MEDICINE

## 2022-02-08 PROCEDURE — 36415 COLL VENOUS BLD VENIPUNCTURE: CPT

## 2022-02-08 PROCEDURE — 1036F TOBACCO NON-USER: CPT | Performed by: FAMILY MEDICINE

## 2022-02-08 PROCEDURE — 99214 OFFICE O/P EST MOD 30 MIN: CPT | Performed by: FAMILY MEDICINE

## 2022-02-08 PROCEDURE — 3017F COLORECTAL CA SCREEN DOC REV: CPT | Performed by: FAMILY MEDICINE

## 2022-02-08 PROCEDURE — G8417 CALC BMI ABV UP PARAM F/U: HCPCS | Performed by: FAMILY MEDICINE

## 2022-02-08 PROCEDURE — 83036 HEMOGLOBIN GLYCOSYLATED A1C: CPT

## 2022-02-08 PROCEDURE — G8428 CUR MEDS NOT DOCUMENT: HCPCS | Performed by: FAMILY MEDICINE

## 2022-02-08 RX ORDER — ESCITALOPRAM OXALATE 20 MG/1
20 TABLET ORAL DAILY
Qty: 90 TABLET | Refills: 1 | Status: SHIPPED | OUTPATIENT
Start: 2022-02-08 | End: 2022-09-10

## 2022-02-08 RX ORDER — HYDROXYZINE HYDROCHLORIDE 25 MG/1
25 TABLET, FILM COATED ORAL DAILY PRN
Qty: 30 TABLET | Refills: 0 | Status: SHIPPED | OUTPATIENT
Start: 2022-02-08

## 2022-02-08 SDOH — ECONOMIC STABILITY: FOOD INSECURITY: WITHIN THE PAST 12 MONTHS, THE FOOD YOU BOUGHT JUST DIDN'T LAST AND YOU DIDN'T HAVE MONEY TO GET MORE.: NEVER TRUE

## 2022-02-08 SDOH — ECONOMIC STABILITY: FOOD INSECURITY: WITHIN THE PAST 12 MONTHS, YOU WORRIED THAT YOUR FOOD WOULD RUN OUT BEFORE YOU GOT MONEY TO BUY MORE.: NEVER TRUE

## 2022-02-08 SDOH — ECONOMIC STABILITY: TRANSPORTATION INSECURITY
IN THE PAST 12 MONTHS, HAS THE LACK OF TRANSPORTATION KEPT YOU FROM MEDICAL APPOINTMENTS OR FROM GETTING MEDICATIONS?: NO

## 2022-02-08 SDOH — ECONOMIC STABILITY: TRANSPORTATION INSECURITY
IN THE PAST 12 MONTHS, HAS LACK OF TRANSPORTATION KEPT YOU FROM MEETINGS, WORK, OR FROM GETTING THINGS NEEDED FOR DAILY LIVING?: NO

## 2022-02-08 ASSESSMENT — PATIENT HEALTH QUESTIONNAIRE - PHQ9
SUM OF ALL RESPONSES TO PHQ QUESTIONS 1-9: 0
2. FEELING DOWN, DEPRESSED OR HOPELESS: 0
SUM OF ALL RESPONSES TO PHQ QUESTIONS 1-9: 0
SUM OF ALL RESPONSES TO PHQ9 QUESTIONS 1 & 2: 0
1. LITTLE INTEREST OR PLEASURE IN DOING THINGS: 0

## 2022-02-08 ASSESSMENT — SOCIAL DETERMINANTS OF HEALTH (SDOH): HOW HARD IS IT FOR YOU TO PAY FOR THE VERY BASICS LIKE FOOD, HOUSING, MEDICAL CARE, AND HEATING?: NOT HARD AT ALL

## 2022-02-09 LAB
ANION GAP SERPL CALCULATED.3IONS-SCNC: 13 MMOL/L (ref 3–16)
BUN BLDV-MCNC: 13 MG/DL (ref 7–20)
CALCIUM SERPL-MCNC: 9.9 MG/DL (ref 8.3–10.6)
CHLORIDE BLD-SCNC: 100 MMOL/L (ref 99–110)
CO2: 24 MMOL/L (ref 21–32)
CREAT SERPL-MCNC: 0.8 MG/DL (ref 0.9–1.3)
ESTIMATED AVERAGE GLUCOSE: 125.5 MG/DL
GFR AFRICAN AMERICAN: >60
GFR NON-AFRICAN AMERICAN: >60
GLUCOSE BLD-MCNC: 96 MG/DL (ref 70–99)
HBA1C MFR BLD: 6 %
POTASSIUM SERPL-SCNC: 4.2 MMOL/L (ref 3.5–5.1)
SODIUM BLD-SCNC: 137 MMOL/L (ref 136–145)

## 2022-08-07 DIAGNOSIS — F41.9 ANXIETY: ICD-10-CM

## 2022-08-08 NOTE — TELEPHONE ENCOUNTER
Refill Request       Last Seen: Last Seen Department: 2/8/2022  Last Seen by PCP: 2/8/2022    Last Written: 2/8/22 #90 with 1      Next Appointment:   No future appointments.           Requested Prescriptions     Pending Prescriptions Disp Refills    escitalopram (LEXAPRO) 20 MG tablet [Pharmacy Med Name: ESCITALOPRAM 20 MG TABLET] 90 tablet 1     Sig: TAKE ONE TABLET BY MOUTH DAILY

## 2022-08-08 NOTE — TELEPHONE ENCOUNTER
I informed Darion Welch at [facility] that pt needed an appointment

## 2022-08-16 RX ORDER — ESCITALOPRAM OXALATE 20 MG/1
TABLET ORAL
Qty: 90 TABLET | Refills: 0 | OUTPATIENT
Start: 2022-08-16

## 2022-09-08 DIAGNOSIS — F41.9 ANXIETY: ICD-10-CM

## 2022-09-08 NOTE — TELEPHONE ENCOUNTER
Refill Request       Last Seen: Last Seen Department: 2/8/2022  Last Seen by PCP: 2/8/2022    Last Written: 02/08/2022    Next Appointment:   Future Appointments   Date Time Provider Elaine Ornelas   9/27/2022 10:00 AM Tamiko Segura MD [facility] AND RES [facility]             Requested Prescriptions     Pending Prescriptions Disp Refills    escitalopram (LEXAPRO) 20 MG tablet [Pharmacy Med Name: ESCITALOPRAM 20 MG TABLET] 90 tablet 1     Sig: TAKE ONE TABLET BY MOUTH DAILY

## 2022-09-10 RX ORDER — ESCITALOPRAM OXALATE 20 MG/1
TABLET ORAL
Qty: 90 TABLET | Refills: 1 | Status: SHIPPED | OUTPATIENT
Start: 2022-09-10

## 2022-09-27 ENCOUNTER — OFFICE VISIT (OUTPATIENT)
Dept: PRIMARY CARE CLINIC | Age: 52
End: 2022-09-27
Payer: COMMERCIAL

## 2022-09-27 ENCOUNTER — HOSPITAL ENCOUNTER (OUTPATIENT)
Age: 52
Discharge: HOME | End: 2022-09-27
Payer: COMMERCIAL

## 2022-09-27 VITALS
WEIGHT: 278 LBS | HEART RATE: 69 BPM | TEMPERATURE: 97.8 F | SYSTOLIC BLOOD PRESSURE: 138 MMHG | DIASTOLIC BLOOD PRESSURE: 76 MMHG | BODY MASS INDEX: 37.65 KG/M2 | HEIGHT: 72 IN | OXYGEN SATURATION: 98 %

## 2022-09-27 DIAGNOSIS — E78.00 PURE HYPERCHOLESTEROLEMIA: ICD-10-CM

## 2022-09-27 DIAGNOSIS — Z11.59 NEED FOR HEPATITIS C SCREENING TEST: ICD-10-CM

## 2022-09-27 DIAGNOSIS — E66.01 CLASS 2 SEVERE OBESITY DUE TO EXCESS CALORIES WITH SERIOUS COMORBIDITY AND BODY MASS INDEX (BMI) OF 35.0 TO 35.9 IN ADULT (HCC): ICD-10-CM

## 2022-09-27 DIAGNOSIS — F41.9 ANXIETY: ICD-10-CM

## 2022-09-27 DIAGNOSIS — R73.03 PREDIABETES: Primary | ICD-10-CM

## 2022-09-27 DIAGNOSIS — R73.03 PREDIABETES: ICD-10-CM

## 2022-09-27 LAB
A/G RATIO: 1.6 (ref 1.1–2.2)
ALBUMIN SERPL-MCNC: 4.5 G/DL (ref 3.4–5)
ALP BLD-CCNC: 115 U/L (ref 40–129)
ALT SERPL-CCNC: 32 U/L (ref 10–40)
ANION GAP SERPL CALCULATED.3IONS-SCNC: 9 MMOL/L (ref 3–16)
AST SERPL-CCNC: 27 U/L (ref 15–37)
BILIRUB SERPL-MCNC: 0.7 MG/DL (ref 0–1)
BUN BLDV-MCNC: 13 MG/DL (ref 7–20)
CALCIUM SERPL-MCNC: 9.5 MG/DL (ref 8.3–10.6)
CHLORIDE BLD-SCNC: 104 MMOL/L (ref 99–110)
CHOLESTEROL, TOTAL: 190 MG/DL (ref 0–199)
CO2: 27 MMOL/L (ref 21–32)
CREAT SERPL-MCNC: 0.9 MG/DL (ref 0.9–1.3)
GFR AFRICAN AMERICAN: >60
GFR NON-AFRICAN AMERICAN: >60
GLUCOSE BLD-MCNC: 107 MG/DL (ref 70–99)
HDLC SERPL-MCNC: 47 MG/DL (ref 40–60)
HEPATITIS C ANTIBODY INTERPRETATION: NORMAL
LDL CHOLESTEROL CALCULATED: 131 MG/DL
POTASSIUM SERPL-SCNC: 4.8 MMOL/L (ref 3.5–5.1)
SODIUM BLD-SCNC: 140 MMOL/L (ref 136–145)
TOTAL PROTEIN: 7.4 G/DL (ref 6.4–8.2)
TRIGL SERPL-MCNC: 60 MG/DL (ref 0–150)
VLDLC SERPL CALC-MCNC: 12 MG/DL

## 2022-09-27 PROCEDURE — 83036 HEMOGLOBIN GLYCOSYLATED A1C: CPT

## 2022-09-27 PROCEDURE — 99214 OFFICE O/P EST MOD 30 MIN: CPT | Performed by: FAMILY MEDICINE

## 2022-09-27 PROCEDURE — 80061 LIPID PANEL: CPT

## 2022-09-27 PROCEDURE — 3017F COLORECTAL CA SCREEN DOC REV: CPT | Performed by: FAMILY MEDICINE

## 2022-09-27 PROCEDURE — 80053 COMPREHEN METABOLIC PANEL: CPT

## 2022-09-27 PROCEDURE — 86803 HEPATITIS C AB TEST: CPT

## 2022-09-27 PROCEDURE — G8417 CALC BMI ABV UP PARAM F/U: HCPCS | Performed by: FAMILY MEDICINE

## 2022-09-27 PROCEDURE — 36415 COLL VENOUS BLD VENIPUNCTURE: CPT

## 2022-09-27 PROCEDURE — 1036F TOBACCO NON-USER: CPT | Performed by: FAMILY MEDICINE

## 2022-09-27 PROCEDURE — G8427 DOCREV CUR MEDS BY ELIG CLIN: HCPCS | Performed by: FAMILY MEDICINE

## 2022-09-27 ASSESSMENT — PATIENT HEALTH QUESTIONNAIRE - PHQ9
6. FEELING BAD ABOUT YOURSELF - OR THAT YOU ARE A FAILURE OR HAVE LET YOURSELF OR YOUR FAMILY DOWN: 0
5. POOR APPETITE OR OVEREATING: 1
7. TROUBLE CONCENTRATING ON THINGS, SUCH AS READING THE NEWSPAPER OR WATCHING TELEVISION: 0
8. MOVING OR SPEAKING SO SLOWLY THAT OTHER PEOPLE COULD HAVE NOTICED. OR THE OPPOSITE, BEING SO FIGETY OR RESTLESS THAT YOU HAVE BEEN MOVING AROUND A LOT MORE THAN USUAL: 0
SUM OF ALL RESPONSES TO PHQ QUESTIONS 1-9: 3
4. FEELING TIRED OR HAVING LITTLE ENERGY: 1
SUM OF ALL RESPONSES TO PHQ QUESTIONS 1-9: 3
1. LITTLE INTEREST OR PLEASURE IN DOING THINGS: 0
SUM OF ALL RESPONSES TO PHQ QUESTIONS 1-9: 3
2. FEELING DOWN, DEPRESSED OR HOPELESS: 0
SUM OF ALL RESPONSES TO PHQ QUESTIONS 1-9: 3
10. IF YOU CHECKED OFF ANY PROBLEMS, HOW DIFFICULT HAVE THESE PROBLEMS MADE IT FOR YOU TO DO YOUR WORK, TAKE CARE OF THINGS AT HOME, OR GET ALONG WITH OTHER PEOPLE: 0
9. THOUGHTS THAT YOU WOULD BE BETTER OFF DEAD, OR OF HURTING YOURSELF: 0
SUM OF ALL RESPONSES TO PHQ9 QUESTIONS 1 & 2: 0
3. TROUBLE FALLING OR STAYING ASLEEP: 1

## 2022-09-27 ASSESSMENT — ANXIETY QUESTIONNAIRES
4. TROUBLE RELAXING: 1
7. FEELING AFRAID AS IF SOMETHING AWFUL MIGHT HAPPEN: 1
1. FEELING NERVOUS, ANXIOUS, OR ON EDGE: 2
2. NOT BEING ABLE TO STOP OR CONTROL WORRYING: 2
IF YOU CHECKED OFF ANY PROBLEMS ON THIS QUESTIONNAIRE, HOW DIFFICULT HAVE THESE PROBLEMS MADE IT FOR YOU TO DO YOUR WORK, TAKE CARE OF THINGS AT HOME, OR GET ALONG WITH OTHER PEOPLE: NOT DIFFICULT AT ALL
GAD7 TOTAL SCORE: 11
3. WORRYING TOO MUCH ABOUT DIFFERENT THINGS: 2
6. BECOMING EASILY ANNOYED OR IRRITABLE: 2
5. BEING SO RESTLESS THAT IT IS HARD TO SIT STILL: 1

## 2022-09-27 NOTE — PROGRESS NOTES
PROGRESS NOTE     Aurelia Fulton MD  [address] [place] [address]. Joss Anton [address]         Phone: [phone]    Date of Service:  9/27/2022     Patient ID: Kojo Ramirez is a 49 y.o. male      Assessment / Plan:     1. Prediabetes  Checking A1c. We will follow up on results and act accordingly. Encouraged pt to try to eat more of his wife's healthy home cooking. Getting plenty of activity at work. 2. Class 2 severe obesity due to excess calories with serious comorbidity and body mass index (BMI) of 36 in adult [facility])  Patient has lost 5 lbs in last 6 months. Encouraged pt to try to eat more of his wife's healthy home cooking. He gets plenty of exercise at work    3. Pure hypercholesterolemia  The 10-year ASCVD risk score (Kristian PIRES, et al., 2019) is: 3.8%    Values used to calculate the score:      Age: 49 years      Sex: Male      Is Non- : No      Diabetic: No      Tobacco smoker: No      Systolic Blood Pressure: 866 mmHg      Is BP treated: No      HDL Cholesterol: 50 mg/dL      Total Cholesterol: 195 mg/dL    Due for repeat cholesterol at this time. We will repeat and recalculate 10 year ASCVD risk score. 4. Anxiety  Mostly well controlled with Lexapro 20 mg. Patient to continue. Prescribing as needed hydroxyzine to use for breakthrough anxiety. Encourage patient to reconsider going to a counselor. He states he will think about it. HM:    Sending to get colonoscopy. Referral placed at last time. Gave number to schedule once again    COVID : Discussed importance of getting this. I do not believe patient is very interested, but will keep trying to make sure he is educated on the importance.     Get shingrix at pharmacy as has medicaid    Flu vaccine declined      Subjective:     CC: prediabetes, HLD, obesity, anxiety    HPI        Patient has increased water intake. Trying to eat healthy foods, but \"doesn't like wife's cooking\" as doesn't like \" food\". 67-year-old white male here to discuss the above chronic medical conditions. Patient states he has been trying to eat healthier, but often will eat a dinner separate from his wife. His wife cooks healthy homemade Asian food, and he states he does not like some of the cuisine. He has worked on increasing his water intake. He states he gets plenty of exercise at work as he walks around the [employer] all night long. Only medication patient currently is taking his Lexapro for anxiety. He states it controls his symptoms most of the time. He does admit that he sometimes has trouble letting things go that bother him at work. Counselor has been recommended in the past, but he has been reluctant to go. Trauma hx: He was in foster care until he was 13years old, and \"too many homes to count \". It was very hard growing up without any true family structure. Patient denies any other current symptoms. See negative review of systems below.         Lab Results   Component Value Date    LABA1C 6.0 02/08/2022    LABA1C 5.4 08/03/2021    LABA1C 5.8 12/09/2020     Lab Results   Component Value Date    LABMICR YES 04/21/2021    CREATININE 0.8 (L) 02/08/2022     Lab Results   Component Value Date    ALT 15 08/03/2021    AST 19 08/03/2021     Lab Results   Component Value Date    CHOL 195 08/03/2021    TRIG 83 08/03/2021    HDL 50 08/03/2021    LDLCALC 128 (H) 08/03/2021          ROS:    Constitutional:  Negative for activity or appetite change, fever or fatigue  Resp:  Negative for SOB, chest tightness, cough  Cardiovascular: Negative for CP, palpitations, SANTOS, orthopnea, PND, LE edema  Gastrointestinal: Negative for abd pain, melena, BRBPR, N/V/D  Endocrine:  Negative for polydipsia and polyuria  :  Negative for dysuria, flank pain or urinary frequency  Musculoskeletal:  Negative for myalgias  Neuro:  Negative for dizziness or lightheadedness  Psych: negative for depression       Vitals:    09/27/22 1005   BP: 138/76   Site: Left Upper Arm   Position: Sitting   Cuff Size: Small Adult   Pulse: 69   Temp: 97.8 °F (36.6 °C)   TempSrc: Temporal   SpO2: 98%   Weight: 278 lb (126.1 kg)   Height: 6' (1.829 m)       Outpatient Medications Marked as Taking for the 9/27/22 encounter (Office Visit) with Anurag Ely MD   Medication Sig Dispense Refill    escitalopram (LEXAPRO) 20 MG tablet TAKE ONE TABLET BY MOUTH DAILY 90 tablet 1             Objective:   Constitutional:   Reviewed vitals above  Well Nourished, well developed, no distress       Neck:  Symmetric and without masses  No thyromegaly  Resp:  Normal effort  Clear to auscultation bilaterally without rhonchi, wheezing or crackles  Cardiovascular: On auscultation, normal S1 and S2 without murmurs, rubs or gallops  No bruits of bilateral carotids and no JVD  Gastrointestinal:  Nontender, nondistended, and no masses  No hepatosplenomegaly  Musculoskeletal:  Normal Gait  All extremities without clubbing, cyanosis or edema  Skin:  No rashes on inspection  No areas of increased heat or induration on palpation  Psych:  Normal mood and affect  Normal insight and judgement        EMR Dragon/transcription disclaimer:  Much of this encounter note is electronic transcription/translation of spoken language to printed texts. The electronic translation of spoken language may be erroneous, or at times, nonsensical words or phrases may be inadvertently transcribed.   Although I have reviewed the note for such errors, some may still exist.

## 2022-09-28 LAB
ESTIMATED AVERAGE GLUCOSE: 125.5 MG/DL
HBA1C MFR BLD: 6 %

## 2023-01-20 ENCOUNTER — HOSPITAL ENCOUNTER (EMERGENCY)
Age: 53
Discharge: HOME | End: 2023-01-20
Attending: EMERGENCY MEDICINE
Payer: COMMERCIAL

## 2023-01-20 VITALS
HEIGHT: 72 IN | OXYGEN SATURATION: 96 % | SYSTOLIC BLOOD PRESSURE: 146 MMHG | BODY MASS INDEX: 38.19 KG/M2 | TEMPERATURE: 99.7 F | HEART RATE: 96 BPM | WEIGHT: 281.97 LBS | DIASTOLIC BLOOD PRESSURE: 93 MMHG | RESPIRATION RATE: 18 BRPM

## 2023-01-20 DIAGNOSIS — J02.9 ACUTE PHARYNGITIS, UNSPECIFIED ETIOLOGY: Primary | ICD-10-CM

## 2023-01-20 LAB
RAPID INFLUENZA  B AGN: NEGATIVE
RAPID INFLUENZA A AGN: NEGATIVE
S PYO AG THROAT QL: NEGATIVE
SARS-COV-2, NAAT: NOT DETECTED

## 2023-01-20 PROCEDURE — 87880 STREP A ASSAY W/OPTIC: CPT

## 2023-01-20 PROCEDURE — 87077 CULTURE AEROBIC IDENTIFY: CPT

## 2023-01-20 PROCEDURE — 87081 CULTURE SCREEN ONLY: CPT

## 2023-01-20 PROCEDURE — 6370000000 HC RX 637 (ALT 250 FOR IP): Performed by: EMERGENCY MEDICINE

## 2023-01-20 PROCEDURE — 87635 SARS-COV-2 COVID-19 AMP PRB: CPT

## 2023-01-20 PROCEDURE — 99283 EMERGENCY DEPT VISIT LOW MDM: CPT

## 2023-01-20 PROCEDURE — 87804 INFLUENZA ASSAY W/OPTIC: CPT

## 2023-01-20 RX ORDER — IBUPROFEN 600 MG/1
600 TABLET ORAL EVERY 8 HOURS PRN
Qty: 15 TABLET | Refills: 0 | Status: SHIPPED | OUTPATIENT
Start: 2023-01-20 | End: 2023-01-25

## 2023-01-20 RX ORDER — ACETAMINOPHEN 325 MG/1
650 TABLET ORAL ONCE
Status: COMPLETED | OUTPATIENT
Start: 2023-01-20 | End: 2023-01-20

## 2023-01-20 RX ORDER — IBUPROFEN 800 MG/1
800 TABLET ORAL ONCE
Status: COMPLETED | OUTPATIENT
Start: 2023-01-20 | End: 2023-01-20

## 2023-01-20 RX ADMIN — IBUPROFEN 800 MG: 800 TABLET, FILM COATED ORAL at 07:09

## 2023-01-20 RX ADMIN — ACETAMINOPHEN 650 MG: 325 TABLET ORAL at 07:09

## 2023-01-20 ASSESSMENT — ENCOUNTER SYMPTOMS
BLOOD IN STOOL: 0
VOICE CHANGE: 0
BACK PAIN: 0
SORE THROAT: 1
TROUBLE SWALLOWING: 0
WHEEZING: 0
ABDOMINAL PAIN: 0
FACIAL SWELLING: 0
STRIDOR: 0
SHORTNESS OF BREATH: 0
NAUSEA: 0
COLOR CHANGE: 0
VOMITING: 0
PHOTOPHOBIA: 0

## 2023-01-20 ASSESSMENT — PAIN DESCRIPTION - DESCRIPTORS
DESCRIPTORS: SORE
DESCRIPTORS: SORE

## 2023-01-20 ASSESSMENT — PAIN - FUNCTIONAL ASSESSMENT
PAIN_FUNCTIONAL_ASSESSMENT: 0-10
PAIN_FUNCTIONAL_ASSESSMENT: PREVENTS OR INTERFERES SOME ACTIVE ACTIVITIES AND ADLS
PAIN_FUNCTIONAL_ASSESSMENT: 0-10

## 2023-01-20 ASSESSMENT — PAIN SCALES - GENERAL
PAINLEVEL_OUTOF10: 5
PAINLEVEL_OUTOF10: 8
PAINLEVEL_OUTOF10: 5

## 2023-01-20 ASSESSMENT — LIFESTYLE VARIABLES
HOW MANY STANDARD DRINKS CONTAINING ALCOHOL DO YOU HAVE ON A TYPICAL DAY: PATIENT DOES NOT DRINK
HOW OFTEN DO YOU HAVE A DRINK CONTAINING ALCOHOL: NEVER

## 2023-01-20 ASSESSMENT — PAIN DESCRIPTION - LOCATION
LOCATION: THROAT
LOCATION: THROAT

## 2023-01-20 ASSESSMENT — PAIN DESCRIPTION - FREQUENCY: FREQUENCY: CONTINUOUS

## 2023-01-20 ASSESSMENT — PAIN DESCRIPTION - PAIN TYPE: TYPE: ACUTE PAIN

## 2023-01-20 NOTE — ED PROVIDER NOTES
[facility]  eMERGENCY dEPARTMENT eNCOUnter      Pt Name: Wing Coleman  MRN: [id number]  [place] 1970  Date of evaluation: 1/20/2023  Provider: Roselyn Chou MD    [address] [place] [address]       Chief Complaint   Patient presents with    Pharyngitis     Sore throat since yesterday          HISTORY OF PRESENT ILLNESS   (Location/Symptom, Timing/Onset, Context/Setting, Quality, Duration, Modifying Factors, Severity)  Note limiting factors. History obtained from: the patient    Wing Coleman is a 47 y.o. male with hx of hypertension, hypercholesteremia, tobacco abuse, and anxiety who presents due to 2 days of sore throat, fever, chills, and fatigue. Patient denies any cough runny nose vomiting diarrhea chest pain or abdominal pain. Patient has no shortness of breath. Patient report symptoms are moderate constant and worsening. Patient has not taken any medication such as Tylenol or ibuprofen prior to coming to the ER. HPI    Nursing Notes were reviewed. REVIEW OFSYSTEMS    (2-9 systems for level 4, 10 or more for level 5)     Review of Systems   Constitutional:  Positive for chills, fatigue and fever. Negative for appetite change and unexpected weight change. HENT:  Positive for sore throat. Negative for facial swelling, trouble swallowing and voice change. Eyes:  Negative for photophobia and visual disturbance. Respiratory:  Negative for shortness of breath, wheezing and stridor. Cardiovascular:  Negative for chest pain and palpitations. Gastrointestinal:  Negative for abdominal pain, blood in stool, nausea and vomiting. Genitourinary:  Negative for difficulty urinating and dysuria. Musculoskeletal:  Negative for back pain, gait problem and neck pain. Skin:  Negative for color change and wound. Neurological:  Negative for seizures, syncope and speech difficulty. Psychiatric/Behavioral:  Negative for self-injury and suicidal ideas.       Except as noted above the remainder of the review of systems was reviewed and negative. PAST MEDICAL HISTORY     Past Medical History:   Diagnosis Date    Anxiety     was on paxil in the past    Elevated LFTs     History of tobacco abuse     Hypertension     Prediabetes     Pure hypercholesterolemia     Tobacco dependence in remission          SURGICAL HISTORY       Past Surgical History:   Procedure Laterality Date    CYSTOSCOPY N/A 3/29/2021    CYSTOSCOPY,  SUPRAPUBIC TUBE INSERTION performed by Yuko Smith MD at [address] 4/22/2021    INCISION AND DRAINAGE SCROTAL ABSCESS, CYSTOSCOPY, EXCHANGE OF SUPRA PUBIC CATHETER performed by Yuko Smtih MD at [address] [facility] [address]       Previous Medications    ESCITALOPRAM (LEXAPRO) 20 MG TABLET    TAKE ONE TABLET BY MOUTH DAILY       ALLERGIES     Patient has no known allergies.     FAMILY HISTORY       Family History   Problem Relation Age of Onset    Stroke Father     Asthma Sister     High Blood Pressure Brother     No Known Problems Mother           SOCIAL HISTORY       Social History     Socioeconomic History    Marital status:    Tobacco Use    Smoking status: Former     Packs/day: 1.50     Years: 11.00     Pack years: 16.50     Types: Cigarettes     Quit date: 5/13/2007     Years since quitting: 15.7    Smokeless tobacco: Former   Vaping Use    Vaping Use: Never used   Substance and Sexual Activity    Alcohol use: No     Alcohol/week: 0.0 standard drinks    Drug use: No    Sexual activity: Yes     Partners: Female     Social Determinants of Health     Financial Resource Strain: Low Risk     Difficulty of Paying Living Expenses: Not hard at all   Food Insecurity: No Food Insecurity    Worried About Running Out of Food in the Last Year: Never true    Ran Out of Food in the Last Year: Never true   Transportation Needs: No Transportation Needs    Lack of Transportation (Medical): No    Lack of Transportation (Non-Medical): No         PHYSICAL EXAM    (up to 7 for level 4, 8 or more for level 5)     ED Triage Vitals [01/20/23 0657]   BP Temp Temp Source Heart Rate Resp SpO2 Height Weight   (!) 171/102 100 °F (37.8 °C) Tympanic 95 18 95 % 6' (1.829 m) 281 lb 15.5 oz (127.9 kg)       Physical Exam  Vitals and nursing note reviewed. Constitutional:       General: He is not in acute distress. Appearance: He is well-developed. HENT:      Head: Normocephalic and atraumatic. Mouth/Throat:      Mouth: Mucous membranes are moist.      Pharynx: Posterior oropharyngeal erythema present. Comments: Patient speaking only, breathing normally, no signs of impending airway obstruction. Mild posterior oropharynx erythema and diffuse edema. Uvula midline. No deformity of peritonsillar arches bilaterally. No visible peritonsillar abscess. Eyes:      Conjunctiva/sclera: Conjunctivae normal.   Neck:      Vascular: No JVD. Trachea: No tracheal deviation. Cardiovascular:      Rate and Rhythm: Normal rate. Pulmonary:      Effort: Pulmonary effort is normal. No respiratory distress. Breath sounds: No stridor. Comments: Lungs clear. Normal work of breathing. Skin:     General: Skin is warm and dry. Neurological:      Mental Status: He is alert. DIAGNOSTIC RESULTS         LABS:  Labs Reviewed   STREP SCREEN GROUP A THROAT   COVID-19, RAPID   RAPID INFLUENZA A/B ANTIGENS   CULTURE, BETA STREP CONFIRM PLATES       All otherlabs were within normal range or not returned as of this dictation.       EMERGENCY DEPARTMENT COURSE and DIFFERENTIAL DIAGNOSIS/MDM:   Vitals:    Vitals:    01/20/23 0657 01/20/23 0710 01/20/23 0756   BP: (!) 171/102 (!) 172/106 (!) 146/93   Pulse: 95  96   Resp: 18  18   Temp: 100 °F (37.8 °C)  99.7 °F (37.6 °C)   TempSrc: Tympanic  Tympanic   SpO2: 95% 95% 96%   Weight: 281 lb 15.5 oz (127.9 kg)     Height: 6' (1.829 m)         Patient was given the following medications:  Medications   acetaminophen (TYLENOL) tablet 650 mg (650 mg Oral Given 1/20/23 0709)   ibuprofen (ADVIL;MOTRIN) tablet 800 mg (800 mg Oral Given 1/20/23 0709)       CC/HPI Summary, DDx, ED Course, Reassessment, Disposition Considerations (include Tests not done, Admit vs D/C, Shared Decision Making, Pt Expectation of Test or Tx.):  Patient has borderline temperature of 100.0 but is likely afebrile. Patient is mildly hypertensive but other vital signs within normal limits. Rapid influenza, rapid COVID, and rapid strep test are all negative. Strep swab is sent for confirmatory culture. Patient was given ibuprofen and Tylenol and upon reevaluation temperature mildly improves, blood pressure significantly improved, patient reports improvement in his sore throat saying pain is decreased from 8 out of 10 to a 5 out of 10. He is tolerating p.o. well with no signs of impending airway obstruction. I have discussed with him the possibility of a false negative rapid strep test and how a confirmatory culture will be sent and he might be called in the next 2 days if it is positive. In the meantime I have recommended ibuprofen, p.o. fluids, rest, primary care follow-up, and strict ER return precautions for fever, difficulty breathing or swallowing, or difficulty handling oral secretions. We will consider laboratory evaluation or advanced imaging but based on patient's vital signs and physical exam I do not feel that is indicated at this time. Patient expresses understanding and agreement with this plan and is discharged home. I estimate there is low risk for epiglottitis, PTA, RPA, deep space abscess, pneumonia, meningitis, or sepsis, thus I consider the discharge disposition reasonable. Also, there is no evidence for peritonitis, sepsis, or toxicity. We have discussed the diagnosis and risks, and we agree with discharging home to follow-up with their primary doctor.  We also discussed returning to the Emergency Department immediately if new or worsening symptoms occur. We have discussed the symptoms which are most concerning (e.g., changing or worsening pain, trouble swallowing or breathing, neck stiffness, fever) that necessitate immediate return. FINAL IMPRESSION      1. Acute pharyngitis, unspecified etiology          DISPOSITION/PLAN     DISPOSITION Decision To Discharge 01/20/2023 07:59:38 AM      PATIENT REFERRED TO:  Nadine Burton MD  [address] [place] [address] [address]  [phone]    In 1 week      [facility] [id number]  [phone]    If symptoms worsen    DISCHARGE MEDICATIONS:  New Prescriptions    IBUPROFEN (ADVIL;MOTRIN) 600 MG TABLET    Take 1 tablet by mouth every 8 hours as needed for Pain              (Please note that portions of this note were completed with a voice recognition program.  Efforts were made to edit the dictations but occasionally words are mis-transcribed. )    Roseann Forrest MD (electronically signed)  Attending Emergency Physician           Roseann Forrest MD  01/20/23 0055

## 2023-01-20 NOTE — ED NOTES
Report given to [facility] who states understanding and had no further questions at this time.       Karina Lo RN  01/20/23 3968

## 2023-01-20 NOTE — DISCHARGE INSTRUCTIONS
If you have a high fever not responsive to the ibuprofen or any difficulty breathing or swallowing please come back to the emergency department for further evaluation.

## 2023-01-22 LAB
ORGANISM: ABNORMAL
S PYO THROAT QL CULT: ABNORMAL
S PYO THROAT QL CULT: ABNORMAL

## 2023-01-22 NOTE — RESULT ENCOUNTER NOTE
Culture reviewed, please contact patient and inform them of the results and call in Pen-Vee K 500 mg p.o. twice daily

## 2023-01-23 NOTE — RESULT ENCOUNTER NOTE
Patient's positive result has been appropriately evaluated by the provider pool. Patient was contacted and notified of the change in treatment plan.     Medication was phoned to the patient's pharmacy per protocol:    Pharmacy:   [facility] [id number] - [address]  Phone: [phone] Fax: [phone]      Phone number: [phone]  Pharmacist receiving the prescription: [facility]

## 2023-03-06 DIAGNOSIS — F41.9 ANXIETY: ICD-10-CM

## 2023-03-06 RX ORDER — ESCITALOPRAM OXALATE 20 MG/1
TABLET ORAL
Qty: 90 TABLET | Refills: 1 | Status: SHIPPED | OUTPATIENT
Start: 2023-03-06

## 2023-03-06 NOTE — TELEPHONE ENCOUNTER
Refill Request       Last Seen: Last Seen Department: 9/27/2022  Last Seen by PCP: 9/27/2022    Last Written: 9/10/22    Next Appointment:   Future Appointments   Date Time Provider Elaine Ornelas   3/27/2023  1:00 PM Tamiko Hampton MD [facility] AND RES [facility]       Future appointment scheduled      Requested Prescriptions     Pending Prescriptions Disp Refills    escitalopram (LEXAPRO) 20 MG tablet [Pharmacy Med Name: ESCITALOPRAM 20 MG TABLET] 90 tablet 1     Sig: TAKE ONE TABLET BY MOUTH DAILY

## 2023-03-27 ENCOUNTER — OFFICE VISIT (OUTPATIENT)
Dept: PRIMARY CARE CLINIC | Age: 53
End: 2023-03-27
Payer: COMMERCIAL

## 2023-03-27 VITALS
WEIGHT: 290.2 LBS | BODY MASS INDEX: 39.31 KG/M2 | HEART RATE: 82 BPM | HEIGHT: 72 IN | SYSTOLIC BLOOD PRESSURE: 128 MMHG | RESPIRATION RATE: 18 BRPM | OXYGEN SATURATION: 96 % | TEMPERATURE: 98.2 F | DIASTOLIC BLOOD PRESSURE: 82 MMHG

## 2023-03-27 DIAGNOSIS — E78.00 PURE HYPERCHOLESTEROLEMIA: ICD-10-CM

## 2023-03-27 DIAGNOSIS — F41.9 ANXIETY: ICD-10-CM

## 2023-03-27 DIAGNOSIS — R73.03 PREDIABETES: Primary | ICD-10-CM

## 2023-03-27 DIAGNOSIS — E66.01 MORBID OBESITY DUE TO EXCESS CALORIES (HCC): ICD-10-CM

## 2023-03-27 LAB — HBA1C MFR BLD: 6.4 %

## 2023-03-27 PROCEDURE — G8484 FLU IMMUNIZE NO ADMIN: HCPCS | Performed by: FAMILY MEDICINE

## 2023-03-27 PROCEDURE — 99214 OFFICE O/P EST MOD 30 MIN: CPT | Performed by: FAMILY MEDICINE

## 2023-03-27 PROCEDURE — 83036 HEMOGLOBIN GLYCOSYLATED A1C: CPT | Performed by: FAMILY MEDICINE

## 2023-03-27 PROCEDURE — G8427 DOCREV CUR MEDS BY ELIG CLIN: HCPCS | Performed by: FAMILY MEDICINE

## 2023-03-27 PROCEDURE — 1036F TOBACCO NON-USER: CPT | Performed by: FAMILY MEDICINE

## 2023-03-27 PROCEDURE — G8417 CALC BMI ABV UP PARAM F/U: HCPCS | Performed by: FAMILY MEDICINE

## 2023-03-27 PROCEDURE — 3017F COLORECTAL CA SCREEN DOC REV: CPT | Performed by: FAMILY MEDICINE

## 2023-03-27 ASSESSMENT — PATIENT HEALTH QUESTIONNAIRE - PHQ9
SUM OF ALL RESPONSES TO PHQ QUESTIONS 1-9: 1
1. LITTLE INTEREST OR PLEASURE IN DOING THINGS: 0
4. FEELING TIRED OR HAVING LITTLE ENERGY: 2
1. LITTLE INTEREST OR PLEASURE IN DOING THINGS: 0
6. FEELING BAD ABOUT YOURSELF - OR THAT YOU ARE A FAILURE OR HAVE LET YOURSELF OR YOUR FAMILY DOWN: 0
2. FEELING DOWN, DEPRESSED OR HOPELESS: 1
7. TROUBLE CONCENTRATING ON THINGS, SUCH AS READING THE NEWSPAPER OR WATCHING TELEVISION: 0
SUM OF ALL RESPONSES TO PHQ QUESTIONS 1-9: 4
5. POOR APPETITE OR OVEREATING: 0
2. FEELING DOWN, DEPRESSED OR HOPELESS: 1
SUM OF ALL RESPONSES TO PHQ QUESTIONS 1-9: 4
9. THOUGHTS THAT YOU WOULD BE BETTER OFF DEAD, OR OF HURTING YOURSELF: 0
SUM OF ALL RESPONSES TO PHQ QUESTIONS 1-9: 1
8. MOVING OR SPEAKING SO SLOWLY THAT OTHER PEOPLE COULD HAVE NOTICED. OR THE OPPOSITE, BEING SO FIGETY OR RESTLESS THAT YOU HAVE BEEN MOVING AROUND A LOT MORE THAN USUAL: 0
SUM OF ALL RESPONSES TO PHQ QUESTIONS 1-9: 4
SUM OF ALL RESPONSES TO PHQ QUESTIONS 1-9: 1
SUM OF ALL RESPONSES TO PHQ QUESTIONS 1-9: 1
SUM OF ALL RESPONSES TO PHQ QUESTIONS 1-9: 4
SUM OF ALL RESPONSES TO PHQ9 QUESTIONS 1 & 2: 1
10. IF YOU CHECKED OFF ANY PROBLEMS, HOW DIFFICULT HAVE THESE PROBLEMS MADE IT FOR YOU TO DO YOUR WORK, TAKE CARE OF THINGS AT HOME, OR GET ALONG WITH OTHER PEOPLE: 0
SUM OF ALL RESPONSES TO PHQ9 QUESTIONS 1 & 2: 1
3. TROUBLE FALLING OR STAYING ASLEEP: 1

## 2023-03-27 ASSESSMENT — ANXIETY QUESTIONNAIRES
3. WORRYING TOO MUCH ABOUT DIFFERENT THINGS: 2
7. FEELING AFRAID AS IF SOMETHING AWFUL MIGHT HAPPEN: 1
IF YOU CHECKED OFF ANY PROBLEMS ON THIS QUESTIONNAIRE, HOW DIFFICULT HAVE THESE PROBLEMS MADE IT FOR YOU TO DO YOUR WORK, TAKE CARE OF THINGS AT HOME, OR GET ALONG WITH OTHER PEOPLE: NOT DIFFICULT AT ALL
6. BECOMING EASILY ANNOYED OR IRRITABLE: 2
GAD7 TOTAL SCORE: 11
5. BEING SO RESTLESS THAT IT IS HARD TO SIT STILL: 1
1. FEELING NERVOUS, ANXIOUS, OR ON EDGE: 1
GAD7 TOTAL SCORE: 1.1
2. NOT BEING ABLE TO STOP OR CONTROL WORRYING: 2
4. TROUBLE RELAXING: 2

## 2023-03-27 NOTE — PROGRESS NOTES
PROGRESS NOTE     Victor Manuel Antonio MD  [address] [place] [address]. [address] [facility] [place] [address]         Phone: [phone]    Date of Service:  3/27/2023     Patient ID: . Vivia Goldberg is a 47 y.o. male      Assessment / Plan:     1. Prediabetes  Poc A1c= 6.4 today. Discussed that A1c has worsened. Discussed starting metformin. Patient instead wants to work hard on decreasing carbohydrates and increasing activity over the next 3 months. We will repeat A1c at that time and decide next steps. Patient states he knows what to do, he just needs to do it. 2. Class 2 severe obesity due to excess calories with serious comorbidity and body mass index (BMI) of 39 in [facility])  Patient aware he is gained 12 pounds in the last 6 months. As stated above wants to work on healthy lifestyle changes. 3. Pure hypercholesterolemia  The 10-year ASCVD risk score (Kristian PIRES, et al., 2019) is: 3.8%    Values used to calculate the score:      Age: 47 years      Sex: Male      Is Non- : No      Diabetic: No      Tobacco smoker: No      Systolic Blood Pressure: 452 mmHg      Is BP treated: No      HDL Cholesterol: 47 mg/dL      Total Cholesterol: 190 mg/dL    Discussed that patient does not qualify for cholesterol medicine at this time. We will continue to monitor annually. 4. Anxiety  Mostly well controlled with Lexapro 20 mg. Patient to continue. HM:    Once again reminded patient he is overdue for colonoscopy. Discussed the importance of this. Gave him number to schedule. COVID : Discussed importance of getting this. I do not believe patient is very interested, but will keep trying to make sure he is educated on the importance.     Get shingrix at pharmacy as has medicaid    Flu vaccine declined      Subjective:     CC: prediabetes, HLD, obesity,

## 2023-06-26 ENCOUNTER — OFFICE VISIT (OUTPATIENT)
Dept: PRIMARY CARE CLINIC | Age: 53
End: 2023-06-26
Payer: COMMERCIAL

## 2023-06-26 ENCOUNTER — HOSPITAL ENCOUNTER (OUTPATIENT)
Age: 53
Discharge: HOME | End: 2023-06-26

## 2023-06-26 VITALS
HEART RATE: 72 BPM | WEIGHT: 278 LBS | DIASTOLIC BLOOD PRESSURE: 76 MMHG | TEMPERATURE: 97.3 F | BODY MASS INDEX: 37.7 KG/M2 | SYSTOLIC BLOOD PRESSURE: 122 MMHG | OXYGEN SATURATION: 97 %

## 2023-06-26 DIAGNOSIS — E66.01 CLASS 2 SEVERE OBESITY DUE TO EXCESS CALORIES WITH SERIOUS COMORBIDITY AND BODY MASS INDEX (BMI) OF 37.0 TO 37.9 IN ADULT (HCC): ICD-10-CM

## 2023-06-26 DIAGNOSIS — R73.03 PREDIABETES: Primary | ICD-10-CM

## 2023-06-26 DIAGNOSIS — F41.9 ANXIETY: ICD-10-CM

## 2023-06-26 DIAGNOSIS — E78.00 PURE HYPERCHOLESTEROLEMIA: ICD-10-CM

## 2023-06-26 PROCEDURE — G8427 DOCREV CUR MEDS BY ELIG CLIN: HCPCS | Performed by: FAMILY MEDICINE

## 2023-06-26 PROCEDURE — 1036F TOBACCO NON-USER: CPT | Performed by: FAMILY MEDICINE

## 2023-06-26 PROCEDURE — 3017F COLORECTAL CA SCREEN DOC REV: CPT | Performed by: FAMILY MEDICINE

## 2023-06-26 PROCEDURE — G8417 CALC BMI ABV UP PARAM F/U: HCPCS | Performed by: FAMILY MEDICINE

## 2023-06-26 PROCEDURE — 99214 OFFICE O/P EST MOD 30 MIN: CPT | Performed by: FAMILY MEDICINE

## 2023-06-26 SDOH — ECONOMIC STABILITY: HOUSING INSECURITY
IN THE LAST 12 MONTHS, WAS THERE A TIME WHEN YOU DID NOT HAVE A STEADY PLACE TO SLEEP OR SLEPT IN A SHELTER (INCLUDING NOW)?: NO

## 2023-06-26 SDOH — ECONOMIC STABILITY: INCOME INSECURITY: HOW HARD IS IT FOR YOU TO PAY FOR THE VERY BASICS LIKE FOOD, HOUSING, MEDICAL CARE, AND HEATING?: NOT HARD AT ALL

## 2023-06-26 SDOH — ECONOMIC STABILITY: FOOD INSECURITY: WITHIN THE PAST 12 MONTHS, YOU WORRIED THAT YOUR FOOD WOULD RUN OUT BEFORE YOU GOT MONEY TO BUY MORE.: NEVER TRUE

## 2023-06-26 SDOH — ECONOMIC STABILITY: FOOD INSECURITY: WITHIN THE PAST 12 MONTHS, THE FOOD YOU BOUGHT JUST DIDN'T LAST AND YOU DIDN'T HAVE MONEY TO GET MORE.: NEVER TRUE

## 2023-06-26 ASSESSMENT — PATIENT HEALTH QUESTIONNAIRE - PHQ9
1. LITTLE INTEREST OR PLEASURE IN DOING THINGS: 0
SUM OF ALL RESPONSES TO PHQ QUESTIONS 1-9: 0
SUM OF ALL RESPONSES TO PHQ9 QUESTIONS 1 & 2: 0
SUM OF ALL RESPONSES TO PHQ QUESTIONS 1-9: 0
SUM OF ALL RESPONSES TO PHQ QUESTIONS 1-9: 0
2. FEELING DOWN, DEPRESSED OR HOPELESS: 0
SUM OF ALL RESPONSES TO PHQ QUESTIONS 1-9: 0

## 2023-06-26 ASSESSMENT — ANXIETY QUESTIONNAIRES
6. BECOMING EASILY ANNOYED OR IRRITABLE: 1
3. WORRYING TOO MUCH ABOUT DIFFERENT THINGS: 2
5. BEING SO RESTLESS THAT IT IS HARD TO SIT STILL: 0
GAD7 TOTAL SCORE: 7
7. FEELING AFRAID AS IF SOMETHING AWFUL MIGHT HAPPEN: 1
IF YOU CHECKED OFF ANY PROBLEMS ON THIS QUESTIONNAIRE, HOW DIFFICULT HAVE THESE PROBLEMS MADE IT FOR YOU TO DO YOUR WORK, TAKE CARE OF THINGS AT HOME, OR GET ALONG WITH OTHER PEOPLE: NOT DIFFICULT AT ALL
2. NOT BEING ABLE TO STOP OR CONTROL WORRYING: 2
4. TROUBLE RELAXING: 1
1. FEELING NERVOUS, ANXIOUS, OR ON EDGE: 0

## 2023-06-30 ENCOUNTER — HOSPITAL ENCOUNTER (OUTPATIENT)
Age: 53
Discharge: HOME | End: 2023-06-30
Payer: COMMERCIAL

## 2023-06-30 DIAGNOSIS — E78.00 PURE HYPERCHOLESTEROLEMIA: ICD-10-CM

## 2023-06-30 DIAGNOSIS — R73.03 PREDIABETES: ICD-10-CM

## 2023-06-30 LAB
ALBUMIN SERPL-MCNC: 4.4 G/DL (ref 3.4–5)
ALBUMIN/GLOB SERPL: 1.2 {RATIO} (ref 1.1–2.2)
ALP SERPL-CCNC: 103 U/L (ref 40–129)
ALT SERPL-CCNC: 20 U/L (ref 10–40)
ANION GAP SERPL CALCULATED.3IONS-SCNC: 11 MMOL/L (ref 3–16)
AST SERPL-CCNC: 21 U/L (ref 15–37)
BILIRUB SERPL-MCNC: 0.7 MG/DL (ref 0–1)
BUN SERPL-MCNC: 13 MG/DL (ref 7–20)
CALCIUM SERPL-MCNC: 9.6 MG/DL (ref 8.3–10.6)
CHLORIDE SERPL-SCNC: 102 MMOL/L (ref 99–110)
CHOLEST SERPL-MCNC: 167 MG/DL (ref 0–199)
CO2 SERPL-SCNC: 26 MMOL/L (ref 21–32)
CREAT SERPL-MCNC: 0.9 MG/DL (ref 0.9–1.3)
GFR SERPLBLD CREATININE-BSD FMLA CKD-EPI: >60 ML/MIN/{1.73_M2}
GLUCOSE SERPL-MCNC: 86 MG/DL (ref 70–99)
HDLC SERPL-MCNC: 51 MG/DL (ref 40–60)
LDLC SERPL CALC-MCNC: 106 MG/DL
POTASSIUM SERPL-SCNC: 4.8 MMOL/L (ref 3.5–5.1)
PROT SERPL-MCNC: 8 G/DL (ref 6.4–8.2)
SODIUM SERPL-SCNC: 139 MMOL/L (ref 136–145)
TRIGL SERPL-MCNC: 51 MG/DL (ref 0–150)
VLDLC SERPL CALC-MCNC: 10 MG/DL

## 2023-06-30 PROCEDURE — 83036 HEMOGLOBIN GLYCOSYLATED A1C: CPT

## 2023-06-30 PROCEDURE — 36415 COLL VENOUS BLD VENIPUNCTURE: CPT

## 2023-06-30 PROCEDURE — 80061 LIPID PANEL: CPT

## 2023-06-30 PROCEDURE — 80053 COMPREHEN METABOLIC PANEL: CPT

## 2023-07-01 LAB
EST. AVERAGE GLUCOSE BLD GHB EST-MCNC: 116.9 MG/DL
HBA1C MFR BLD: 5.7 %

## 2023-11-03 DIAGNOSIS — F41.9 ANXIETY: ICD-10-CM

## 2023-11-03 NOTE — TELEPHONE ENCOUNTER
Refill Request     Return in about 6 months (around 12/26/2023) for chronic disease f/u. Last Seen: Last Seen Department: 6/26/2023  Last Seen by PCP: 6/26/2023    Last Written: 3/6/2023 90 with 1    Next Appointment:   No future appointments.           Requested Prescriptions     Pending Prescriptions Disp Refills    escitalopram (LEXAPRO) 20 MG tablet 90 tablet 1     Sig: Take 1 tablet by mouth daily

## 2023-11-05 DIAGNOSIS — F41.9 ANXIETY: ICD-10-CM

## 2023-11-06 RX ORDER — ESCITALOPRAM OXALATE 20 MG/1
TABLET ORAL
Qty: 90 TABLET | Refills: 1 | OUTPATIENT
Start: 2023-11-06

## 2023-11-06 RX ORDER — ESCITALOPRAM OXALATE 20 MG/1
20 TABLET ORAL DAILY
Qty: 90 TABLET | Refills: 0 | Status: SHIPPED | OUTPATIENT
Start: 2023-11-06

## 2023-11-06 NOTE — TELEPHONE ENCOUNTER
Refill Request     CONFIRM preferred pharmacy with the patient.    If Mail Order Rx - Pend for 90 day refill.      Last Seen: Last Seen Department: 6/26/2023  Last Seen by PCP: 6/26/2023    Last Written: 3/6/23 90 tabs 1 refill     If no future appointment scheduled:  Review the last OV with PCP and review information for follow-up visit,  Route STAFF MESSAGE with patient name to the  Pool for scheduling with the following information:            -  Timing of next visit           -  Visit type ie Physical, OV, etc           -  Diagnoses/Reason ie. COPD, HTN - Do not use MEDICATION, Follow-up or CHECK UP - Give reason for visit      Next Appointment:   No future appointments.    Message sent to  to schedule appt with patient?  NO      Requested Prescriptions     Pending Prescriptions Disp Refills    escitalopram (LEXAPRO) 20 MG tablet [Pharmacy Med Name: ESCITALOPRAM 20 MG TABLET] 90 tablet 1     Sig: TAKE ONE TABLET BY MOUTH DAILY

## 2023-11-06 NOTE — TELEPHONE ENCOUNTER
Pt due for appointment in January. Please schedule    Please document call and then close encounter.   Thanks

## 2024-01-05 ENCOUNTER — COMMUNITY OUTREACH (OUTPATIENT)
Dept: PRIMARY CARE CLINIC | Age: 54
End: 2024-01-05

## 2024-01-05 NOTE — PROGRESS NOTES
Patient's HM shows they are overdue for Colorectal Screening.   Care Everywhere and  files searched.  No results to attach to order nor HM updated.

## 2024-02-13 DIAGNOSIS — F41.9 ANXIETY: ICD-10-CM

## 2024-02-13 NOTE — TELEPHONE ENCOUNTER
Refill Request       Last Seen: Last Seen Department: 6/26/2023  Last Seen by PCP: 6/26/2023    Last Written: 11/6/23 90 with 0    Next Appointment:   No future appointments.        Requested Prescriptions     Pending Prescriptions Disp Refills    escitalopram (LEXAPRO) 20 MG tablet 90 tablet 0     Sig: Take 1 tablet by mouth daily

## 2024-02-15 RX ORDER — ESCITALOPRAM OXALATE 20 MG/1
20 TABLET ORAL DAILY
Qty: 90 TABLET | Refills: 0 | Status: SHIPPED | OUTPATIENT
Start: 2024-02-15

## 2024-02-15 NOTE — TELEPHONE ENCOUNTER
Patient is calling back for an update he said he has been out of medication for 3 days and would really like this refilled today

## 2024-02-15 NOTE — TELEPHONE ENCOUNTER
Per last refill request patient was over due for an appointment- Called patient and assisted him in scheduling per his availability. Patient has appointment on 2/19/24. Patient states that he he is starting to feel withdraw-type symptoms as he has not had his Lexapro in 4 days. Verified correct pharmacy. Please advise if this can be called in today.

## 2024-02-19 ENCOUNTER — OFFICE VISIT (OUTPATIENT)
Dept: PRIMARY CARE CLINIC | Age: 54
End: 2024-02-19
Payer: COMMERCIAL

## 2024-02-19 ENCOUNTER — HOSPITAL ENCOUNTER (OUTPATIENT)
Age: 54
Discharge: HOME | End: 2024-02-19
Payer: COMMERCIAL

## 2024-02-19 VITALS
TEMPERATURE: 97.4 F | OXYGEN SATURATION: 96 % | WEIGHT: 286 LBS | HEART RATE: 75 BPM | BODY MASS INDEX: 38.74 KG/M2 | SYSTOLIC BLOOD PRESSURE: 132 MMHG | HEIGHT: 72 IN | DIASTOLIC BLOOD PRESSURE: 78 MMHG | RESPIRATION RATE: 18 BRPM

## 2024-02-19 DIAGNOSIS — F41.9 ANXIETY: Primary | ICD-10-CM

## 2024-02-19 DIAGNOSIS — Z00.00 HEALTHCARE MAINTENANCE: ICD-10-CM

## 2024-02-19 DIAGNOSIS — R73.03 PREDIABETES: ICD-10-CM

## 2024-02-19 DIAGNOSIS — F41.9 ANXIETY: ICD-10-CM

## 2024-02-19 DIAGNOSIS — E66.9 OBESITY, CLASS II, BMI 35-39.9: ICD-10-CM

## 2024-02-19 DIAGNOSIS — F17.201 TOBACCO DEPENDENCE IN REMISSION: ICD-10-CM

## 2024-02-19 DIAGNOSIS — Z12.11 COLON CANCER SCREENING: ICD-10-CM

## 2024-02-19 PROBLEM — E66.812 OBESITY, CLASS II, BMI 35-39.9: Status: ACTIVE | Noted: 2024-02-19

## 2024-02-19 PROBLEM — E66.01 MORBID OBESITY DUE TO EXCESS CALORIES (HCC): Status: RESOLVED | Noted: 2021-03-29 | Resolved: 2024-02-19

## 2024-02-19 LAB
ALBUMIN SERPL-MCNC: 4.5 G/DL (ref 3.4–5)
ALBUMIN/GLOB SERPL: 1.3 {RATIO} (ref 1.1–2.2)
ALP SERPL-CCNC: 96 U/L (ref 40–129)
ALT SERPL-CCNC: 24 U/L (ref 10–40)
ANION GAP SERPL CALCULATED.3IONS-SCNC: 9 MMOL/L (ref 3–16)
AST SERPL-CCNC: 23 U/L (ref 15–37)
BILIRUB SERPL-MCNC: 0.4 MG/DL (ref 0–1)
BUN SERPL-MCNC: 16 MG/DL (ref 7–20)
CALCIUM SERPL-MCNC: 9.2 MG/DL (ref 8.3–10.6)
CHLORIDE SERPL-SCNC: 103 MMOL/L (ref 99–110)
CHOLEST SERPL-MCNC: 201 MG/DL (ref 0–199)
CO2 SERPL-SCNC: 25 MMOL/L (ref 21–32)
CREAT SERPL-MCNC: 0.9 MG/DL (ref 0.9–1.3)
GFR SERPLBLD CREATININE-BSD FMLA CKD-EPI: >60 ML/MIN/{1.73_M2}
GLUCOSE SERPL-MCNC: 119 MG/DL (ref 70–99)
HDLC SERPL-MCNC: 55 MG/DL (ref 40–60)
LDLC SERPL CALC-MCNC: 125 MG/DL
POTASSIUM SERPL-SCNC: 4.3 MMOL/L (ref 3.5–5.1)
PROT SERPL-MCNC: 8.1 G/DL (ref 6.4–8.2)
SODIUM SERPL-SCNC: 137 MMOL/L (ref 136–145)
TRIGL SERPL-MCNC: 103 MG/DL (ref 0–150)
VLDLC SERPL CALC-MCNC: 21 MG/DL

## 2024-02-19 PROCEDURE — 36415 COLL VENOUS BLD VENIPUNCTURE: CPT

## 2024-02-19 PROCEDURE — G8427 DOCREV CUR MEDS BY ELIG CLIN: HCPCS

## 2024-02-19 PROCEDURE — 83036 HEMOGLOBIN GLYCOSYLATED A1C: CPT

## 2024-02-19 PROCEDURE — 1036F TOBACCO NON-USER: CPT

## 2024-02-19 PROCEDURE — G8484 FLU IMMUNIZE NO ADMIN: HCPCS

## 2024-02-19 PROCEDURE — 80053 COMPREHEN METABOLIC PANEL: CPT

## 2024-02-19 PROCEDURE — 99214 OFFICE O/P EST MOD 30 MIN: CPT

## 2024-02-19 PROCEDURE — 80061 LIPID PANEL: CPT

## 2024-02-19 PROCEDURE — G8417 CALC BMI ABV UP PARAM F/U: HCPCS

## 2024-02-19 PROCEDURE — 3017F COLORECTAL CA SCREEN DOC REV: CPT

## 2024-02-19 ASSESSMENT — PATIENT HEALTH QUESTIONNAIRE - PHQ9
SUM OF ALL RESPONSES TO PHQ QUESTIONS 1-9: 0
SUM OF ALL RESPONSES TO PHQ9 QUESTIONS 1 & 2: 0
1. LITTLE INTEREST OR PLEASURE IN DOING THINGS: 0
2. FEELING DOWN, DEPRESSED OR HOPELESS: 0
SUM OF ALL RESPONSES TO PHQ QUESTIONS 1-9: 0

## 2024-02-19 NOTE — PROGRESS NOTES
PROGRESS NOTE   [facility]                                  [address], [address], [place] [address]         Phone: [phone]    Date of Service:  2/19/2024     Patient ID: Gordon Weeks is a 53 y.o. male      Subjective:     CC: Chronic care follow-up    HPI  Patient is a 53-year-old male with a past medical history of anxiety, prediabetes, obesity, tobacco use in remission who presents today for chronic care follow-up.    Patient states that his Lexapro keeps him even.  He denies any SI or HI.  He states that he has gained some of the weight that he lost back.  He denies any chest pain, shortness of breath, headaches or vision changes.  He denies all vaccinations today.  He does need a new referral for GI to get a screening colonoscopy completed.      ROS:    Negative expect for pertinent positives listed in the HPI        Vitals:    02/19/24 1040   BP: 132/78   Site: Left Upper Arm   Position: Sitting   Cuff Size: Large Adult   Pulse: 75   Resp: 18   Temp: 97.4 °F (36.3 °C)   TempSrc: Temporal   SpO2: 96%   Weight: 129.7 kg (286 lb)   Height: 1.829 m (6')       Allergies:  Patient has no known allergies.    Outpatient Medications Marked as Taking for the 2/19/24 encounter (Office Visit) with Tasha De Anda, DO   Medication Sig Dispense Refill    escitalopram (LEXAPRO) 20 MG tablet Take 1 tablet by mouth daily 90 tablet 0         Objective:     Constitutional:   Reviewed vitals above  Well Nourished, well developed, no distress       HENT:  Normal external nose without lesions  Resp:  Normal effort  Clear to auscultation bilaterally without rhonchi, wheezing or crackles  Cardiovascular:  On auscultation, regular rate and rhythm without murmurs, rubs or gallops  No JVD  Gastrointestinal:  Nontender, nondistended, and no masses  No hepatosplenomegaly  Musculoskeletal:  Normal Gait  All extremities without clubbing, cyanosis or

## 2024-02-20 LAB
EST. AVERAGE GLUCOSE BLD GHB EST-MCNC: 122.6 MG/DL
HBA1C MFR BLD: 5.9 %

## 2024-05-12 DIAGNOSIS — F41.9 ANXIETY: ICD-10-CM

## 2024-05-13 RX ORDER — ESCITALOPRAM OXALATE 20 MG/1
20 TABLET ORAL DAILY
Qty: 90 TABLET | Refills: 0 | Status: SHIPPED | OUTPATIENT
Start: 2024-05-13

## 2024-05-13 NOTE — TELEPHONE ENCOUNTER
Refill Request     CONFIRM preferred pharmacy with the patient.    If Mail Order Rx - Pend for 90 day refill.      Last Seen: Last Seen Department: 2/19/2024  Last Seen by PCP: 6/26/2023    Last Written: 2/15/24    If no future appointment scheduled:  Review the last OV with PCP and review information for follow-up visit,  Route STAFF MESSAGE with patient name to the  Pool for scheduling with the following information:            -  Timing of next visit           -  Visit type ie Physical, OV, etc           -  Diagnoses/Reason ie. COPD, HTN - Do not use MEDICATION, Follow-up or CHECK UP - Give reason for visit      Next Appointment:   No future appointments.    Message sent to  to schedule appt with patient?  N/A      Requested Prescriptions     Pending Prescriptions Disp Refills    escitalopram (LEXAPRO) 20 MG tablet [Pharmacy Med Name: ESCITALOPRAM 20 MG TABLET] 90 tablet 0     Sig: TAKE 1 TABLET BY MOUTH DAILY

## 2024-05-13 NOTE — TELEPHONE ENCOUNTER
Due for appointment in August.    Please call to schedule folllow up with me.  Meds refilled    Please document call and then close encounter.  thanks

## 2024-08-08 DIAGNOSIS — F41.9 ANXIETY: ICD-10-CM

## 2024-08-08 RX ORDER — ESCITALOPRAM OXALATE 20 MG/1
20 TABLET ORAL DAILY
Qty: 90 TABLET | Refills: 0 | Status: SHIPPED | OUTPATIENT
Start: 2024-08-08

## 2024-08-08 NOTE — TELEPHONE ENCOUNTER
Refill Request       Last Seen: Last Seen Department: 2/19/2024  Last Seen by PCP: 6/26/2023    Last Written: 5/13/24 90 with 0    Next Appointment:   Future Appointments   Date Time Provider Department Center   8/26/2024 10:40 AM Tenzin Crespo, DO [facility] DEP     Requested Prescriptions     Pending Prescriptions Disp Refills    escitalopram (LEXAPRO) 20 MG tablet [Pharmacy Med Name: ESCITALOPRAM 20 MG TABLET] 90 tablet 0     Sig: TAKE 1 TABLET BY MOUTH DAILY

## 2024-09-03 ENCOUNTER — OFFICE VISIT (OUTPATIENT)
Dept: PRIMARY CARE CLINIC | Age: 54
End: 2024-09-03
Payer: COMMERCIAL

## 2024-09-03 ENCOUNTER — HOSPITAL ENCOUNTER (OUTPATIENT)
Age: 54
Discharge: HOME | End: 2024-09-03
Payer: COMMERCIAL

## 2024-09-03 VITALS
TEMPERATURE: 98.2 F | OXYGEN SATURATION: 96 % | WEIGHT: 280 LBS | HEART RATE: 70 BPM | BODY MASS INDEX: 37.97 KG/M2 | DIASTOLIC BLOOD PRESSURE: 76 MMHG | SYSTOLIC BLOOD PRESSURE: 116 MMHG

## 2024-09-03 DIAGNOSIS — E78.00 PURE HYPERCHOLESTEROLEMIA: ICD-10-CM

## 2024-09-03 DIAGNOSIS — R73.03 PREDIABETES: ICD-10-CM

## 2024-09-03 DIAGNOSIS — F41.9 ANXIETY: Primary | ICD-10-CM

## 2024-09-03 LAB
EST. AVERAGE GLUCOSE BLD GHB EST-MCNC: 116.9 MG/DL
HBA1C MFR BLD: 5.7 %

## 2024-09-03 PROCEDURE — 36415 COLL VENOUS BLD VENIPUNCTURE: CPT

## 2024-09-03 PROCEDURE — 99213 OFFICE O/P EST LOW 20 MIN: CPT | Performed by: STUDENT IN AN ORGANIZED HEALTH CARE EDUCATION/TRAINING PROGRAM

## 2024-09-03 PROCEDURE — 83036 HEMOGLOBIN GLYCOSYLATED A1C: CPT

## 2024-09-12 ENCOUNTER — TELEPHONE (OUTPATIENT)
Dept: PRIMARY CARE CLINIC | Age: 54
End: 2024-09-12

## 2024-11-15 ENCOUNTER — OFFICE VISIT (OUTPATIENT)
Dept: PRIMARY CARE CLINIC | Age: 54
End: 2024-11-15
Payer: COMMERCIAL

## 2024-11-15 VITALS
BODY MASS INDEX: 38.4 KG/M2 | SYSTOLIC BLOOD PRESSURE: 162 MMHG | WEIGHT: 283.2 LBS | DIASTOLIC BLOOD PRESSURE: 100 MMHG | HEART RATE: 81 BPM | TEMPERATURE: 98.9 F | OXYGEN SATURATION: 95 %

## 2024-11-15 DIAGNOSIS — J30.2 SEASONAL ALLERGIES: ICD-10-CM

## 2024-11-15 DIAGNOSIS — J06.9 UPPER RESPIRATORY TRACT INFECTION, UNSPECIFIED TYPE: Primary | ICD-10-CM

## 2024-11-15 DIAGNOSIS — R03.0 ELEVATED BP WITHOUT DIAGNOSIS OF HYPERTENSION: ICD-10-CM

## 2024-11-15 PROCEDURE — 99214 OFFICE O/P EST MOD 30 MIN: CPT | Performed by: STUDENT IN AN ORGANIZED HEALTH CARE EDUCATION/TRAINING PROGRAM

## 2024-11-15 RX ORDER — GUAIFENESIN/DEXTROMETHORPHAN 100-10MG/5
5 SYRUP ORAL 3 TIMES DAILY PRN
Qty: 473 ML | Refills: 1 | Status: SHIPPED | OUTPATIENT
Start: 2024-11-15 | End: 2025-01-17

## 2024-11-15 RX ORDER — FLUTICASONE PROPIONATE 50 MCG
2 SPRAY, SUSPENSION (ML) NASAL DAILY
Qty: 48 G | Refills: 1 | Status: SHIPPED | OUTPATIENT
Start: 2024-11-15

## 2024-11-15 RX ORDER — CETIRIZINE HYDROCHLORIDE 10 MG/1
10 TABLET ORAL DAILY
Qty: 90 TABLET | Refills: 1 | Status: SHIPPED | OUTPATIENT
Start: 2024-11-15

## 2024-11-18 NOTE — PROGRESS NOTES
Mallampati score 3  CV    - appears well-perfused    - regular rate  RESP  - normal effort, normal WOB.  - no visualized signs of difficulty breathing or respiratory distress  MSK  - normal Gait  - normal ROM of neck w/o pain  SKIN  - no rashes on inspection of facial skin  PSYCH  - normal mood and affect  - normal insight and judgement    Assessment/Plan     1. Upper respiratory tract infection, unspecified type  - I don't see the value in testing for strep given that patient plans to take antibiotic he was already prescribed  - patient would not meet treatment for covid or flu given mild symptoms  - symptomatic treatment with adequate hydration and rest  - fluticasone (FLONASE) 50 MCG/ACT nasal spray; 2 sprays by Each Nostril route daily  Dispense: 48 g; Refill: 1  - guaiFENesin-dextromethorphan (ROBITUSSIN DM) 100-10 MG/5ML syrup; Take 5 mLs by mouth 3 times daily as needed for Cough  Dispense: 473 mL; Refill: 1    2. Seasonal allergies  - patient's ongoing congestion and cough may be related to seasonal changes given that patient has known seasonal allergies and reports similar symptoms around this time of the year  - cetirizine (ZYRTEC) 10 MG tablet; Take 1 tablet by mouth daily  Dispense: 90 tablet; Refill: 1    3. Elevated BP without diagnosis of hypertension  - likely 2/2 phenylephrine use  - advised to stop phenylephrine and use above meds for symptom treatment  - reassess in future visit and if persistently elevated then initiate anti-htn med    EMR Dragon/transcription disclaimer:  Much of this encounter note is electronic transcription/translation of spoken language to printed texts.  The electronic translation of spoken language may be erroneous, or at times, nonsensical words or phrases may be inadvertently transcribed.  Although I have reviewed the note for such errors, some may still exist.

## 2024-11-22 DIAGNOSIS — F41.9 ANXIETY: ICD-10-CM

## 2024-11-22 NOTE — TELEPHONE ENCOUNTER
Patient is calling requesting a refill of escitalopram (LEXAPRO) 20 MG tablet   please send to [facility] in [place]  patients last OV [phone] and next OV 12.16.24.   please advise patient once medication is sent

## 2024-11-25 RX ORDER — ESCITALOPRAM OXALATE 20 MG/1
20 TABLET ORAL DAILY
Qty: 90 TABLET | Refills: 0 | Status: SHIPPED | OUTPATIENT
Start: 2024-11-25

## 2024-11-25 NOTE — TELEPHONE ENCOUNTER
Refill Request       Last Seen: Last Seen Department: 11/15/2024  Last Seen by PCP: 6/26/2023    Last Written: 8/8/24 90 with 0    Next Appointment:   Future Appointments   Date Time Provider Department Center   12/16/2024  4:20 PM [facility]id[facility]quierdo [facility]   3/3/2025  2:30 PM Tamiko Kaba MD [facility]       Future appointment scheduled      Requested Prescriptions     Pending Prescriptions Disp Refills    escitalopram (LEXAPRO) 20 MG tablet 90 tablet 0     Sig: Take 1 tablet by mouth daily

## 2025-03-01 DIAGNOSIS — F41.9 ANXIETY: ICD-10-CM

## 2025-03-03 RX ORDER — ESCITALOPRAM OXALATE 20 MG/1
20 TABLET ORAL DAILY
Qty: 90 TABLET | Refills: 0 | Status: SHIPPED | OUTPATIENT
Start: 2025-03-03

## 2025-03-03 NOTE — TELEPHONE ENCOUNTER
Refill Request       Last Seen: Last Seen Department: 11/15/2024  Last Seen by PCP: Visit date not found    Last Written: 11/25/24 90 with 0    Next Appointment:   Future Appointments   Date Time Provider Department Center   3/3/2025  2:30 PM Tamiko Kaba MD [facility] DEP       Future appointment scheduled      Requested Prescriptions     Pending Prescriptions Disp Refills    escitalopram (LEXAPRO) 20 MG tablet [Pharmacy Med Name: ESCITALOPRAM 20 MG TABLET] 90 tablet 0     Sig: TAKE 1 TABLET BY MOUTH DAILY

## 2025-06-11 DIAGNOSIS — F41.9 ANXIETY: ICD-10-CM

## 2025-06-11 RX ORDER — ESCITALOPRAM OXALATE 20 MG/1
20 TABLET ORAL DAILY
Qty: 90 TABLET | Refills: 0 | Status: SHIPPED | OUTPATIENT
Start: 2025-06-11

## 2025-06-11 NOTE — TELEPHONE ENCOUNTER
Refill Request       Last Seen: Last Seen Department: 11/15/2024  Last Seen by PCP: 6/26/2023    Last Written: 3/3/25 90 with 0    Next Appointment:   No future appointments.      Requested Prescriptions     Pending Prescriptions Disp Refills    escitalopram (LEXAPRO) 20 MG tablet 90 tablet 0     Sig: Take 1 tablet by mouth daily

## (undated) DEVICE — SHEET, T, LAPAROTOMY, STERILE: Brand: MEDLINE

## (undated) DEVICE — CYSTO/BLADDER IRRIGATION SET, REGULATING CLAMP

## (undated) DEVICE — TOWEL,OR,DSP,ST,BLUE,STD,4/PK,20PK/CS: Brand: MEDLINE

## (undated) DEVICE — CATHETER F BLLN 5CC 16FR 2 W HYDRGEL COAT LESS TRAUM LUB

## (undated) DEVICE — SOLUTION IV IRRIG WATER 1000ML POUR BRL 2F7114

## (undated) DEVICE — SKIN MARKER REGULAR TIP WITH RULER CAP AND LABELS: Brand: DEVON

## (undated) DEVICE — SOLUTION SCRB 4OZ 10% POVIDONE IOD ANTIMIC BTL

## (undated) DEVICE — GUIDEWIRE UROLOGY L150CM DIA0.035IN ANG TIP L3CM PTFE NIT

## (undated) DEVICE — SOLUTION IRRIGATION STRL H2O 3000 ML 4/CA

## (undated) DEVICE — COVER LT HNDL BLU PLAS

## (undated) DEVICE — ELECTRODE PT RET AD L9FT HI MOIST COND ADH HYDRGEL CORDED

## (undated) DEVICE — SOLUTION PREP POVIDONE IOD FOR SKIN MUCOUS MEM PRIOR TO

## (undated) DEVICE — SYRINGE MED 10ML LUERLOCK TIP W/O SFTY DISP

## (undated) DEVICE — SYRINGE,TOOMEY,IRRIGATION,70CC,STERILE: Brand: MEDLINE

## (undated) DEVICE — PENROSE DRAIN 18 X .5" SILICONE: Brand: MEDLINE

## (undated) DEVICE — SOLUTION IV IRRIG POUR BRL 0.9% SODIUM CHL 2F7124

## (undated) DEVICE — GOWN,POLY REINFORCED,LG: Brand: MEDLINE

## (undated) DEVICE — SPONGE GZ W4XL4IN COT 12 PLY TYP VII WVN C FLD DSGN

## (undated) DEVICE — SUTURE CHROMIC GUT SZ 2-0 L27IN ABSRB BRN L26MM SH 1/2 CIR G123H

## (undated) DEVICE — BAG URO DRN URO-CATCHER

## (undated) DEVICE — GLOVE SURG SZ 65 CRM LTX FREE POLYISOPRENE POLYMER BEAD ANTI

## (undated) DEVICE — MERCY HEALTH WEST TURNOVER: Brand: MEDLINE INDUSTRIES, INC.

## (undated) DEVICE — BANDAGE,GAUZE,BULKEE II,4.5"X4.1YD,STRL: Brand: MEDLINE

## (undated) DEVICE — Z DISCONTINUED BY MEDLINE USE 2711682 TRAY SKIN PREP DRY W/ PREM GLV

## (undated) DEVICE — DRAINBAG,ANTI-REFLUX TOWER,L/F,2000ML,LL: Brand: MEDLINE

## (undated) DEVICE — UNDERPAD INCONT 2XL FOR 38-58IN WAIST MESH PROTCT + DISP

## (undated) DEVICE — SET,IRRIGATION,CYSTO/TUR: Brand: MEDLINE

## (undated) DEVICE — TUBING, SUCTION, 1/4" X 12', STRAIGHT: Brand: MEDLINE

## (undated) DEVICE — SUTURE VCRL SZ 3-0 L18IN ABSRB UD L26MM SH 1/2 CIR J864D

## (undated) DEVICE — Y-TYPE TUR/BLADDER IRRIGATION SET, REGULATING CLAMP

## (undated) DEVICE — SYRINGE IRRIG 60ML SFT PLIABLE BLB EZ TO GRP 1 HND USE W/

## (undated) DEVICE — PACK,CYSTOSCOPY,PK III,AURORA: Brand: MEDLINE

## (undated) DEVICE — MINOR SET UP PK

## (undated) DEVICE — STRIP,CLOSURE,WOUND,MEDI-STRIP,1/2X4: Brand: MEDLINE

## (undated) DEVICE — Z INACTIVE USE 2635503 SOLUTION IRRIG 3000ML ST H2O USP UROMATIC PLAS CONT

## (undated) DEVICE — PENCIL ES L3M BTTN SWCH S STL HEX LOK BLDE ELECTRD HOLSTER